# Patient Record
Sex: MALE | Race: WHITE | NOT HISPANIC OR LATINO | Employment: OTHER | URBAN - METROPOLITAN AREA
[De-identification: names, ages, dates, MRNs, and addresses within clinical notes are randomized per-mention and may not be internally consistent; named-entity substitution may affect disease eponyms.]

---

## 2019-12-07 ENCOUNTER — HOSPITAL ENCOUNTER (EMERGENCY)
Facility: CLINIC | Age: 79
Discharge: HOME | End: 2019-12-07
Attending: EMERGENCY MEDICINE | Admitting: EMERGENCY MEDICINE
Payer: COMMERCIAL

## 2019-12-07 VITALS
TEMPERATURE: 97.5 F | HEART RATE: 71 BPM | SYSTOLIC BLOOD PRESSURE: 156 MMHG | DIASTOLIC BLOOD PRESSURE: 77 MMHG | OXYGEN SATURATION: 95 % | BODY MASS INDEX: 31.6 KG/M2 | RESPIRATION RATE: 13 BRPM | WEIGHT: 233 LBS

## 2019-12-07 DIAGNOSIS — R07.9 CHEST PAIN, UNSPECIFIED TYPE: ICD-10-CM

## 2019-12-07 LAB
ALBUMIN SERPL-MCNC: 3.9 G/DL (ref 3.4–5)
ALP SERPL-CCNC: 115 U/L (ref 40–150)
ALT SERPL W P-5'-P-CCNC: 39 U/L (ref 0–70)
ANION GAP SERPL CALCULATED.3IONS-SCNC: 7 MMOL/L (ref 3–14)
AST SERPL W P-5'-P-CCNC: 36 U/L (ref 0–45)
BASOPHILS # BLD AUTO: 0 10E9/L (ref 0–0.2)
BASOPHILS NFR BLD AUTO: 0 %
BILIRUB SERPL-MCNC: 0.5 MG/DL (ref 0.2–1.3)
BUN SERPL-MCNC: 19 MG/DL (ref 7–30)
CALCIUM SERPL-MCNC: 9.2 MG/DL (ref 8.5–10.1)
CHLORIDE SERPL-SCNC: 106 MMOL/L (ref 94–109)
CO2 SERPL-SCNC: 24 MMOL/L (ref 20–32)
CREAT SERPL-MCNC: 0.94 MG/DL (ref 0.66–1.25)
DIFFERENTIAL METHOD BLD: ABNORMAL
EOSINOPHIL # BLD AUTO: 0 10E9/L (ref 0–0.7)
EOSINOPHIL NFR BLD AUTO: 0 %
ERYTHROCYTE [DISTWIDTH] IN BLOOD BY AUTOMATED COUNT: 13.5 % (ref 10–15)
GFR SERPL CREATININE-BSD FRML MDRD: 77 ML/MIN/{1.73_M2}
GLUCOSE SERPL-MCNC: 174 MG/DL (ref 70–99)
HCT VFR BLD AUTO: 44 % (ref 40–53)
HGB BLD-MCNC: 14.8 G/DL (ref 13.3–17.7)
INR PPP: 1 (ref 0.86–1.14)
LYMPHOCYTES # BLD AUTO: 14.3 10E9/L (ref 0.8–5.3)
LYMPHOCYTES NFR BLD AUTO: 73 %
MCH RBC QN AUTO: 31.7 PG (ref 26.5–33)
MCHC RBC AUTO-ENTMCNC: 33.6 G/DL (ref 31.5–36.5)
MCV RBC AUTO: 94 FL (ref 78–100)
MONOCYTES # BLD AUTO: 0.4 10E9/L (ref 0–1.3)
MONOCYTES NFR BLD AUTO: 2 %
NEUTROPHILS # BLD AUTO: 4.3 10E9/L (ref 1.6–8.3)
NEUTROPHILS NFR BLD AUTO: 22 %
OTHER CELLS # BLD MANUAL: 0.6 10E9/L
OTHER CELLS NFR BLD MANUAL: 3 %
PLATELET # BLD AUTO: 190 10E9/L (ref 150–450)
PLATELET # BLD EST: ABNORMAL 10*3/UL
POLYCHROMASIA BLD QL SMEAR: SLIGHT
POTASSIUM SERPL-SCNC: 4.3 MMOL/L (ref 3.4–5.3)
PROT SERPL-MCNC: 7.4 G/DL (ref 6.8–8.8)
RBC # BLD AUTO: 4.67 10E12/L (ref 4.4–5.9)
RBC MORPH BLD: ABNORMAL
SMUDGE CELLS BLD QL SMEAR: PRESENT
SODIUM SERPL-SCNC: 137 MMOL/L (ref 133–144)
TROPONIN I SERPL-MCNC: <0.015 UG/L (ref 0–0.04)
TROPONIN I SERPL-MCNC: <0.015 UG/L (ref 0–0.04)
WBC # BLD AUTO: 19.6 10E9/L (ref 4–11)

## 2019-12-07 PROCEDURE — 93010 ELECTROCARDIOGRAM REPORT: CPT | Mod: Z6 | Performed by: EMERGENCY MEDICINE

## 2019-12-07 PROCEDURE — 85025 COMPLETE CBC W/AUTO DIFF WBC: CPT | Performed by: EMERGENCY MEDICINE

## 2019-12-07 PROCEDURE — 25000132 ZZH RX MED GY IP 250 OP 250 PS 637: Performed by: EMERGENCY MEDICINE

## 2019-12-07 PROCEDURE — 93005 ELECTROCARDIOGRAM TRACING: CPT | Performed by: EMERGENCY MEDICINE

## 2019-12-07 PROCEDURE — 80053 COMPREHEN METABOLIC PANEL: CPT | Performed by: EMERGENCY MEDICINE

## 2019-12-07 PROCEDURE — 99284 EMERGENCY DEPT VISIT MOD MDM: CPT | Mod: 25 | Performed by: EMERGENCY MEDICINE

## 2019-12-07 PROCEDURE — 85610 PROTHROMBIN TIME: CPT | Performed by: EMERGENCY MEDICINE

## 2019-12-07 PROCEDURE — 84484 ASSAY OF TROPONIN QUANT: CPT | Performed by: EMERGENCY MEDICINE

## 2019-12-07 PROCEDURE — 99284 EMERGENCY DEPT VISIT MOD MDM: CPT | Performed by: EMERGENCY MEDICINE

## 2019-12-07 RX ORDER — TRAMADOL HYDROCHLORIDE 50 MG/1
50 TABLET ORAL
COMMUNITY
Start: 2019-08-17 | End: 2022-12-16

## 2019-12-07 RX ORDER — ACETAMINOPHEN 500 MG
1000 TABLET ORAL
COMMUNITY
Start: 2019-08-17 | End: 2021-02-11

## 2019-12-07 RX ORDER — NITROGLYCERIN 0.4 MG/1
0.4 TABLET SUBLINGUAL
COMMUNITY
Start: 2019-11-04 | End: 2021-02-11

## 2019-12-07 RX ORDER — METOPROLOL TARTRATE 25 MG/1
12.5 TABLET, FILM COATED ORAL
COMMUNITY
Start: 2019-10-18 | End: 2021-02-11

## 2019-12-07 RX ORDER — LISINOPRIL 5 MG/1
5 TABLET ORAL
COMMUNITY
Start: 2019-10-18 | End: 2021-02-11

## 2019-12-07 RX ORDER — ATORVASTATIN CALCIUM 40 MG/1
40 TABLET, FILM COATED ORAL
COMMUNITY
Start: 2019-10-18 | End: 2021-02-11

## 2019-12-07 RX ORDER — PANTOPRAZOLE SODIUM 40 MG/1
40 TABLET, DELAYED RELEASE ORAL
COMMUNITY
Start: 2019-10-18 | End: 2021-02-11

## 2019-12-07 RX ORDER — ACETAMINOPHEN 500 MG
1000 TABLET ORAL ONCE
Status: COMPLETED | OUTPATIENT
Start: 2019-12-07 | End: 2019-12-07

## 2019-12-07 RX ADMIN — ACETAMINOPHEN 1000 MG: 500 TABLET ORAL at 20:43

## 2019-12-07 NOTE — ED PROVIDER NOTES
History     Chief Complaint   Patient presents with     Chest Pain     The history is provided by the patient.     Brannon Boggs is a 79 year old male who is presenting to the emergency department via EMS with complaints of chest pain. The patient states that he was swimming in a hot tub when he started having chest pain. He took Nitroglycerin prior to arrival and says his pain went from 7/10 to nothing currently. He says the pain lasted only a few minutes, and cleared up after the Nitroglycerin was taken. He felt slightly short of breath, but says this is gone now. He does not feel nauseated and says his legs are not swollen. He has some mild abdominal pain. The patient was seen one year ago after having a heart attack. He had an angiogram done a few months ago that showed things looked stable and unchanged from before. 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 The patient has some bruises on his abdomen from insulin shots. He also has hairy cell leukemia. He was given four 81 mg Aspirin tablets and two Nitroglycerin tablets prior to arrival.  No recent illness, cough, fever or chills.  No leg pain or swelling.  He does describe paresthesias on the bottoms of his feet.  This is not new.    I was able to review his clinical records from care everywhere.  He did have a recent angiogram on 9/25/2019.  Results showed a 0% stenosed proximal LAD lesion that was previously stented.  He also had a mid to distal LAD lesion that was 50%.  Left circumflex showed minimal irregularities.  Proximal/mid RCA showed a 20% stenosis.  RPDA showed a 40% lesion.  Compared to previous angiogram done 8/2/2018 showed no significant change.    Allergies:  No Known Allergies    Problem List:    There are no active problems to display for this patient.       Past Medical History:    No past medical history on file.    Past Surgical History:    No past surgical history on file.    Family History:    No  family history on file.    Social History:  Marital Status:   [2]  Social History     Tobacco Use     Smoking status: Not on file   Substance Use Topics     Alcohol use: Not on file     Drug use: Not on file        Medications:    acetaminophen (TYLENOL) 500 MG tablet  aspirin (ASA) 81 MG tablet  atorvastatin (LIPITOR) 40 MG tablet  GLYBURIDE PO  insulin glargine (LANTUS PEN) 100 UNIT/ML pen  lisinopril (PRINIVIL/ZESTRIL) 5 MG tablet  metFORMIN (GLUCOPHAGE) 500 MG tablet  metoprolol tartrate (LOPRESSOR) 25 MG tablet  nitroGLYcerin (NITROSTAT) 0.4 MG sublingual tablet  pantoprazole (PROTONIX) 40 MG EC tablet  traMADol (ULTRAM) 50 MG tablet  ORDER FOR DME          Review of Systems   All other systems reviewed and are negative.      Physical Exam   BP: 138/80  Pulse: 66  Heart Rate: 65  Temp: 97.5  F (36.4  C)  Resp: 16  Weight: 105.7 kg (233 lb)(bed scale)  SpO2: 96 %      Physical Exam general alert pleasant and cooperative male.  He is mildly demented.  Review of records confirms this.  HEENT shows no facial asymmetry or droop.  Speech is clear.  Neck is supple without bruits or stridor.  Lungs reveal no adventitious sounds.  Cardiac auscultation is normal.  Abdomen is obese but benign to palpation.  His extremities reveal no pitting edema or calf tenderness.  Negative Homans.    ED Course        Procedures               EKG Interpretation:      Interpreted by Mike Marcos MD  Time reviewed: 17:18  Symptoms at time of EKG: None   Rhythm: Sinus  Rate: Normal  Axis: Normal  Ectopy: Partially paced  Conduction: 1st degree AV block  ST Segments/ T Waves: T wave inversion Anterolateral  Q Waves: none  Comparison to prior: 12/16/12.  Today's EKG shows a partially paced rhythm.  Inverted T's laterally.  I do not have printouts of recent EKGs done at his home clinic or cardiology since his procedures.    Clinical Impression: Partially paced EKG with sinus rhythm and first-degree block.  Inverted T's  [facility]                Critical Care time:  none               Results for orders placed or performed during the hospital encounter of 12/07/19 (from the past 24 hour(s))   CBC with platelets differential   Result Value Ref Range    WBC 19.6 (H) 4.0 - 11.0 10e9/L    RBC Count 4.67 4.4 - 5.9 10e12/L    Hemoglobin 14.8 13.3 - 17.7 g/dL    Hematocrit 44.0 40.0 - 53.0 %    MCV 94 78 - 100 fl    MCH 31.7 26.5 - 33.0 pg    MCHC 33.6 31.5 - 36.5 g/dL    RDW 13.5 10.0 - 15.0 %    Platelet Count 190 150 - 450 10e9/L    Diff Method Manual Differential     % Neutrophils 22.0 %    % Lymphocytes 73.0 %    % Monocytes 2.0 %    % Eosinophils 0.0 %    % Basophils 0.0 %    % Other Cells 3.0 %    Absolute Neutrophil 4.3 1.6 - 8.3 10e9/L    Absolute Lymphocytes 14.3 (H) 0.8 - 5.3 10e9/L    Absolute Monocytes 0.4 0.0 - 1.3 10e9/L    Absolute Eosinophils 0.0 0.0 - 0.7 10e9/L    Absolute Basophils 0.0 0.0 - 0.2 10e9/L    Absolute Other Cells 0.6 (H) 0 10e9/L    Polychromasia Slight     Smudge Cells Present     RBC Morphology Consistent with reported results     Platelet Estimate       Automated count confirmed.  Platelet morphology is normal.   INR   Result Value Ref Range    INR 1.00 0.86 - 1.14   Comprehensive metabolic panel   Result Value Ref Range    Sodium 137 133 - 144 mmol/L    Potassium 4.3 3.4 - 5.3 mmol/L    Chloride 106 94 - 109 mmol/L    Carbon Dioxide 24 20 - 32 mmol/L    Anion Gap 7 3 - 14 mmol/L    Glucose 174 (H) 70 - 99 mg/dL    Urea Nitrogen 19 7 - 30 mg/dL    Creatinine 0.94 0.66 - 1.25 mg/dL    GFR Estimate 77 >60 mL/min/[1.73_m2]    GFR Estimate If Black 89 >60 mL/min/[1.73_m2]    Calcium 9.2 8.5 - 10.1 mg/dL    Bilirubin Total 0.5 0.2 - 1.3 mg/dL    Albumin 3.9 3.4 - 5.0 g/dL    Protein Total 7.4 6.8 - 8.8 g/dL    Alkaline Phosphatase 115 40 - 150 U/L    ALT 39 0 - 70 U/L    AST 36 0 - 45 U/L   Troponin I   Result Value Ref Range    Troponin I ES <0.015 0.000 - 0.045 ug/L   Troponin I   Result Value Ref Range     "Troponin I ES <0.015 0.000 - 0.045 ug/L       Medications   acetaminophen (TYLENOL) tablet 1,000 mg (1,000 mg Oral Given 12/7/19 2043)     On repeated checks patient is resting comfortably.  Denies pain to me.  However he tells other staff members that he said chest pain for over a year.  He told the ERT that he had a \"small elephant sitting on his chest\".  However when I confronted him with this he stated he had no pain at all.  Assessments & Plan (with Medical Decision Making)   Brannon Boggs is a 79 year old male who is presenting to the emergency department via EMS with complaints of chest pain. The patient states that he was swimming in a hot tub when he started having chest pain. He took Nitroglycerin prior to arrival and says his pain went from 7/10 to nothing currently. He says the pain lasted only a few minutes, and cleared up after the Nitroglycerin was taken. He felt slightly short of breath, but says this is gone now. He does not feel nauseated and says his legs are not swollen. He has some mild abdominal pain. The patient was seen one year ago after having a heart attack. He had an angiogram done a few months ago that showed things looked stable and unchanged from before. 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 The patient has some bruises on his abdomen from insulin shots. He also has hairy cell leukemia. He was given four 81 mg Aspirin tablets and two Nitroglycerin tablets prior to arrival.  No recent illness, cough, fever or chills.  No leg pain or swelling.  He does describe paresthesias on the bottoms of his feet.  This is not new.    I was able to review his clinical records from care everywhere.  He did have a recent angiogram on 9/25/2019.  Results showed a 0% stenosed proximal LAD lesion that was previously stented.  He also had a mid to distal LAD lesion that was 50%.  Left circumflex showed minimal irregularities.  Proximal/mid RCA showed a 20% stenosis. "  RPDA showed a 40% lesion.  Compared to previous angiogram done 8/2/2018 showed no significant change.  On presentation patient was vitally stable.  Is not hypoxic.  His exam was unremarkable noted above.  EKG showed a partially paced rhythm.  Did not have a recent EKG to compare.  His initial blood work was unremarkable including troponin except his white count is elevated due to his leukemia.  He does have dementia and continually changed his story regarding chest pain and not having chest pain.  Second troponin was unchanged and normal.  Patient is given handout on chest pain.  Reasons to return free assessment discussed.  Tinea taking his previous meds.  I have reviewed the nursing notes.    I have reviewed the findings, diagnosis, plan and need for follow up with the patient.       New Prescriptions    No medications on file       Final diagnoses:   Chest pain, unspecified type                   This document serves as a record of services personally performed by Mike Marcos MD. It was created on their behalf by Areli Earl, a trained medical scribe. The creation of this record is based on the provider's personal observations and the statements of the patient. This document has been checked and approved by the attending provider.  Note: Chart documentation done in part with Dragon Voice Recognition software. Although reviewed after completion, some word and grammatical errors may remain.  12/7/2019   [facility] EMERGENCY DEPARTMENT     Mike Marcos MD  12/07/19 6373

## 2019-12-07 NOTE — ED TRIAGE NOTES
"Pt was swimming and in hot tub with grandkids and started having chest pain, took nitroglycerin prior to arrival with pain  At 7 and now at \"next to nothing\"  "

## 2019-12-07 NOTE — ED AVS SNAPSHOT
[facility] Emergency Department  [address] [place] [address]  Phone:  [phone]  Fax:  [phone]                                    Brannon Boggs   MRN: [id number]    Department:  [facility] Emergency Department   Date of Visit:  12/7/2019           After Visit Summary Signature Page    I have received my discharge instructions, and my questions have been answered. I have discussed any challenges I see with this plan with the nurse or doctor.    ..........................................................................................................................................  Patient/Patient Representative Signature      ..........................................................................................................................................  Patient Representative Print Name and Relationship to Patient    ..................................................               ................................................  Date                                   Time    ..........................................................................................................................................  Reviewed by Signature/Title    ...................................................              ..............................................  Date                                               Time          22EPIC Rev 08/18

## 2019-12-08 NOTE — ED NOTES
Wife and son back at bedside.  Updated family plan for repeat troponin at 2030.  Provider aware family is back.

## 2019-12-08 NOTE — ED NOTES
Provider in room.  Pt denies chest pain or pressure at this time.  Pt changed out of his wet swimsuit into scrub pants and sheets changed.

## 2019-12-08 NOTE — ED NOTES
Spoke with pt about his pain.  Pt states that he has chest discomfort.  The chest discomfort started a year ago and is 1-2/10.  Pt states that it has been a continuous pain.  Today's pain prior to arrival was different then his continuous pain he has.  Pt stated that he does not have the pain he had earlier.  Pt also concerned about the pain/numbness he has in both hands, has had it for a year.  He stated that pain he has been to the doctor to evaluate.

## 2019-12-09 ENCOUNTER — APPOINTMENT (OUTPATIENT)
Dept: GENERAL RADIOLOGY | Facility: CLINIC | Age: 79
End: 2019-12-09
Attending: FAMILY MEDICINE
Payer: COMMERCIAL

## 2019-12-09 ENCOUNTER — HOSPITAL ENCOUNTER (EMERGENCY)
Facility: CLINIC | Age: 79
Discharge: HOME | End: 2019-12-09
Attending: FAMILY MEDICINE | Admitting: FAMILY MEDICINE
Payer: COMMERCIAL

## 2019-12-09 VITALS
DIASTOLIC BLOOD PRESSURE: 75 MMHG | OXYGEN SATURATION: 92 % | HEART RATE: 84 BPM | RESPIRATION RATE: 20 BRPM | SYSTOLIC BLOOD PRESSURE: 145 MMHG | TEMPERATURE: 98.6 F

## 2019-12-09 DIAGNOSIS — M62.81 GENERALIZED MUSCLE WEAKNESS: ICD-10-CM

## 2019-12-09 DIAGNOSIS — R07.9 CHEST PAIN, UNSPECIFIED TYPE: ICD-10-CM

## 2019-12-09 DIAGNOSIS — K59.00 CONSTIPATION, UNSPECIFIED CONSTIPATION TYPE: ICD-10-CM

## 2019-12-09 DIAGNOSIS — R11.2 NAUSEA AND VOMITING, INTRACTABILITY OF VOMITING NOT SPECIFIED, UNSPECIFIED VOMITING TYPE: ICD-10-CM

## 2019-12-09 LAB
ALBUMIN UR-MCNC: NEGATIVE MG/DL
ANION GAP SERPL CALCULATED.3IONS-SCNC: 11 MMOL/L (ref 3–14)
APPEARANCE UR: ABNORMAL
BACTERIA #/AREA URNS HPF: ABNORMAL /HPF
BASOPHILS # BLD AUTO: 0.1 10E9/L (ref 0–0.2)
BASOPHILS NFR BLD AUTO: 0.2 %
BILIRUB UR QL STRIP: NEGATIVE
BUN SERPL-MCNC: 20 MG/DL (ref 7–30)
CALCIUM SERPL-MCNC: 9.2 MG/DL (ref 8.5–10.1)
CHLORIDE SERPL-SCNC: 101 MMOL/L (ref 94–109)
CO2 SERPL-SCNC: 21 MMOL/L (ref 20–32)
COLOR UR AUTO: YELLOW
CREAT SERPL-MCNC: 0.85 MG/DL (ref 0.66–1.25)
DIFFERENTIAL METHOD BLD: ABNORMAL
EOSINOPHIL NFR BLD AUTO: 0.1 %
ERYTHROCYTE [DISTWIDTH] IN BLOOD BY AUTOMATED COUNT: 13.6 % (ref 10–15)
GFR SERPL CREATININE-BSD FRML MDRD: 83 ML/MIN/{1.73_M2}
GLUCOSE SERPL-MCNC: 247 MG/DL (ref 70–99)
GLUCOSE UR STRIP-MCNC: >499 MG/DL
HCT VFR BLD AUTO: 45.2 % (ref 40–53)
HGB BLD-MCNC: 14.9 G/DL (ref 13.3–17.7)
HGB UR QL STRIP: NEGATIVE
HYALINE CASTS #/AREA URNS LPF: 1 /LPF (ref 0–2)
IMM GRANULOCYTES # BLD: 0.1 10E9/L (ref 0–0.4)
IMM GRANULOCYTES NFR BLD: 0.5 %
KETONES UR STRIP-MCNC: 5 MG/DL
LEUKOCYTE ESTERASE UR QL STRIP: NEGATIVE
LYMPHOCYTES # BLD AUTO: 8.3 10E9/L (ref 0.8–5.3)
LYMPHOCYTES NFR BLD AUTO: 38.1 %
MCH RBC QN AUTO: 31.1 PG (ref 26.5–33)
MCHC RBC AUTO-ENTMCNC: 33 G/DL (ref 31.5–36.5)
MCV RBC AUTO: 94 FL (ref 78–100)
MONOCYTES # BLD AUTO: 1 10E9/L (ref 0–1.3)
MONOCYTES NFR BLD AUTO: 4.7 %
MUCOUS THREADS #/AREA URNS LPF: PRESENT /LPF
NEUTROPHILS # BLD AUTO: 12.3 10E9/L (ref 1.6–8.3)
NEUTROPHILS NFR BLD AUTO: 56.4 %
NITRATE UR QL: NEGATIVE
NRBC # BLD AUTO: 0 10*3/UL
NRBC BLD AUTO-RTO: 0 /100
NT-PROBNP SERPL-MCNC: 51 PG/ML (ref 0–1800)
PH UR STRIP: 5 PH (ref 5–7)
PLATELET # BLD AUTO: 202 10E9/L (ref 150–450)
POTASSIUM SERPL-SCNC: 4.5 MMOL/L (ref 3.4–5.3)
RBC # BLD AUTO: 4.79 10E12/L (ref 4.4–5.9)
RBC #/AREA URNS AUTO: <1 /HPF (ref 0–2)
SODIUM SERPL-SCNC: 133 MMOL/L (ref 133–144)
SOURCE: ABNORMAL
SP GR UR STRIP: 1.02 (ref 1–1.03)
TROPONIN I SERPL-MCNC: <0.015 UG/L (ref 0–0.04)
UROBILINOGEN UR STRIP-MCNC: 0 MG/DL (ref 0–2)
WBC # BLD AUTO: 21.8 10E9/L (ref 4–11)
WBC #/AREA URNS AUTO: 3 /HPF (ref 0–5)

## 2019-12-09 PROCEDURE — 85025 COMPLETE CBC W/AUTO DIFF WBC: CPT | Performed by: FAMILY MEDICINE

## 2019-12-09 PROCEDURE — 80048 BASIC METABOLIC PNL TOTAL CA: CPT | Performed by: FAMILY MEDICINE

## 2019-12-09 PROCEDURE — 74019 RADEX ABDOMEN 2 VIEWS: CPT | Mod: TC

## 2019-12-09 PROCEDURE — 87088 URINE BACTERIA CULTURE: CPT | Performed by: FAMILY MEDICINE

## 2019-12-09 PROCEDURE — 99285 EMERGENCY DEPT VISIT HI MDM: CPT | Mod: 25 | Performed by: FAMILY MEDICINE

## 2019-12-09 PROCEDURE — 71046 X-RAY EXAM CHEST 2 VIEWS: CPT | Mod: TC

## 2019-12-09 PROCEDURE — 93010 ELECTROCARDIOGRAM REPORT: CPT | Mod: Z6 | Performed by: FAMILY MEDICINE

## 2019-12-09 PROCEDURE — 96374 THER/PROPH/DIAG INJ IV PUSH: CPT | Performed by: FAMILY MEDICINE

## 2019-12-09 PROCEDURE — 83880 ASSAY OF NATRIURETIC PEPTIDE: CPT | Performed by: FAMILY MEDICINE

## 2019-12-09 PROCEDURE — 93005 ELECTROCARDIOGRAM TRACING: CPT | Performed by: FAMILY MEDICINE

## 2019-12-09 PROCEDURE — 71046 X-RAY EXAM CHEST 2 VIEWS: CPT | Mod: TC | Performed by: RADIOLOGY

## 2019-12-09 PROCEDURE — 25000128 H RX IP 250 OP 636: Performed by: FAMILY MEDICINE

## 2019-12-09 PROCEDURE — 93005 ELECTROCARDIOGRAM TRACING: CPT | Mod: 76 | Performed by: FAMILY MEDICINE

## 2019-12-09 PROCEDURE — 81001 URINALYSIS AUTO W/SCOPE: CPT | Performed by: FAMILY MEDICINE

## 2019-12-09 PROCEDURE — 25000125 ZZHC RX 250: Performed by: FAMILY MEDICINE

## 2019-12-09 PROCEDURE — 84484 ASSAY OF TROPONIN QUANT: CPT | Performed by: FAMILY MEDICINE

## 2019-12-09 PROCEDURE — 93010 ELECTROCARDIOGRAM REPORT: CPT | Mod: 76 | Performed by: FAMILY MEDICINE

## 2019-12-09 PROCEDURE — 87086 URINE CULTURE/COLONY COUNT: CPT | Performed by: FAMILY MEDICINE

## 2019-12-09 PROCEDURE — 96375 TX/PRO/DX INJ NEW DRUG ADDON: CPT | Performed by: FAMILY MEDICINE

## 2019-12-09 RX ORDER — LACTULOSE 20 G/30ML
30 SOLUTION ORAL 3 TIMES DAILY
Qty: 450 ML | Refills: 0 | Status: SHIPPED | OUTPATIENT
Start: 2019-12-09 | End: 2019-12-14

## 2019-12-09 RX ORDER — ONDANSETRON 2 MG/ML
4 INJECTION INTRAMUSCULAR; INTRAVENOUS ONCE
Status: COMPLETED | OUTPATIENT
Start: 2019-12-09 | End: 2019-12-09

## 2019-12-09 RX ORDER — FAMOTIDINE 20 MG/1
20 TABLET, FILM COATED ORAL 2 TIMES DAILY
Qty: 14 TABLET | Refills: 0 | COMMUNITY
Start: 2019-12-09 | End: 2019-12-16

## 2019-12-09 RX ADMIN — FAMOTIDINE 20 MG: 10 INJECTION, SOLUTION INTRAVENOUS at 04:04

## 2019-12-09 RX ADMIN — ONDANSETRON 4 MG: 2 INJECTION INTRAMUSCULAR; INTRAVENOUS at 03:40

## 2019-12-09 NOTE — DISCHARGE INSTRUCTIONS
We did not find a worrisome cause for your chest pain, nausea and vomiting and weakness.  Your blood work were stable including your white blood count, and heart enzyme (troponin).  You have not had a bowel movement for at least 5 days, and having a lot of burping, nausea and vomiting.  Begin lactulose 30 mL's by mouth 3 times a day for the next 5 days to help promote stooling.  Your constipation may also be increasing your acid reflux symptoms.  Add Pepcid 20 mg twice a day for the next week.  Follow-up with your primary care provider, Dr. Tamayo in the next 3-5 days for recheck.  I will notify you if your urinalysis is suspicious for a urinary tract infection.  Return at any time if your symptoms worsen.

## 2019-12-09 NOTE — ED TRIAGE NOTES
Pt presents by EMS with concerns of chest pain and shortness of breath.  According to pt the pain got worse last evening.  Asked pt if he had pain at this time, he responded short of breath.

## 2019-12-09 NOTE — ED NOTES
Pts wife states that pt was in the hot tub on Saturday prior to the onset of chest pain. Reports pt was seen in the ED on Saturday night. Pt continues to c/o chest pain and states that on Sunday he was belching and vomited x 1.

## 2019-12-09 NOTE — ED AVS SNAPSHOT
[facility] Emergency Department  [address] [place] [address]  Phone:  [phone]  Fax:  [phone]                                    Brannon Boggs   MRN: [id number]    Department:  [facility] Emergency Department   Date of Visit:  12/9/2019           After Visit Summary Signature Page    I have received my discharge instructions, and my questions have been answered. I have discussed any challenges I see with this plan with the nurse or doctor.    ..........................................................................................................................................  Patient/Patient Representative Signature      ..........................................................................................................................................  Patient Representative Print Name and Relationship to Patient    ..................................................               ................................................  Date                                   Time    ..........................................................................................................................................  Reviewed by Signature/Title    ...................................................              ..............................................  Date                                               Time          22EPIC Rev 08/18

## 2019-12-09 NOTE — ED PROVIDER NOTES
[facility] ED Provider Note   Patient: Brannon Boggs  MRN #:  [id number]  Date of Visit: December 9, 2019    CC:     Chief Complaint   Patient presents with     Chest Pain     HPI:  Brannon Boggs is a 79 year old male with dementia, diabetes, known coronary disease who presented to the emergency department by EMS after he developed another episode of chest pain, shortness of breath, nausea and vomiting x2, and weakness.  Patient was here in the emergency department on Sunday with an episode of chest pain after he was swimming in the hot tub.  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  He lives in [place], [place], and was in the area to visit his son.  The patient and his wife are staying at the [facility].  Patient was discharged back to the [facility], and at approximately 6 PM Sunday night, he started to develop some significant burping, and he took some Rolaids.  Short time later he threw up, and had another episode around midnight.  He started to develop some increased weakness after the vomiting and could not get out of bed.  Patient reports some chest pains, but this appears to be somewhat chronic.  He reports some shortness of breath.  The patient's wife reports that he was restless most of the night, and appeared to be gradually worsening especially from the standpoint of weakness.  Patient has not had a bowel movement since last Wednesday.  It is not uncommon for him to be constipated and not have a bowel movement for 5 to 7 days.  Based on his records, patient had an interrogation of his pacemaker proximally November 18 showing that it was functioning normally.  It appears to be a demand pacemaker, [employer] manufactured.  Patient had a recent angiogram on September 25 showing a mid to distal LAD lesion that was 50% but no other significant obstructions elsewhere.  His previously  stented proximal LAD was 0% stenosed.    Problem List:  There are no active problems to display for this patient.      No past medical history on file.    MEDS: famotidine (PEPCID) 20 MG tablet  lactulose 20 GM/30ML SOLN  acetaminophen (TYLENOL) 500 MG tablet  aspirin (ASA) 81 MG tablet  atorvastatin (LIPITOR) 40 MG tablet  GLYBURIDE PO  insulin glargine (LANTUS PEN) 100 UNIT/ML pen  lisinopril (PRINIVIL/ZESTRIL) 5 MG tablet  metFORMIN (GLUCOPHAGE) 500 MG tablet  metoprolol tartrate (LOPRESSOR) 25 MG tablet  nitroGLYcerin (NITROSTAT) 0.4 MG sublingual tablet  ORDER FOR DME  pantoprazole (PROTONIX) 40 MG EC tablet  traMADol (ULTRAM) 50 MG tablet        ALLERGIES:  No Known Allergies    No past surgical history on file.    Social History     Tobacco Use     Smoking status: Not on file   Substance Use Topics     Alcohol use: Not on file     Drug use: Not on file         Review of Systems   Except as noted in HPI, all other systems were reviewed and are negative    Physical Exam     Vitals were reviewed  Patient Vitals for the past 12 hrs:   BP Temp Temp src Pulse Heart Rate Resp SpO2   12/09/19 0617 (!) 145/75 98.6  F (37  C) Oral -- 77 20 92 %   12/09/19 0604 -- -- -- -- 84 -- 97 %   12/09/19 0603 (!) 155/85 -- -- 84 -- -- --   12/09/19 0530 (!) 173/81 -- -- 77 74 11 95 %   12/09/19 0515 (!) 165/83 -- -- 76 75 17 96 %   12/09/19 0500 (!) 155/84 -- -- -- 75 17 94 %   12/09/19 0445 (!) 191/98 -- -- 72 71 20 95 %   12/09/19 0415 (!) 179/91 -- -- 70 73 10 95 %   12/09/19 0400 (!) 180/81 -- -- 71 72 18 --   12/09/19 0330 (!) 153/72 -- -- 68 61 13 94 %   12/09/19 0315 (!) 148/75 -- -- 71 67 16 92 %   12/09/19 0300 (!) 161/69 -- -- -- 67 14 97 %   12/09/19 0252 (!) 163/90 97.6  F (36.4  C) Oral -- 72 19 96 %     GENERAL APPEARANCE: Alert, pleasant, no distress; patient is forgetful and admits that he does not remember what happened earlier tonight  FACE: normal facies  EYES: Pupils are equal  HENT: normal external  exam  NECK: no adenopathy or asymmetry  RESP: normal respiratory effort; clear breath sounds bilaterally  CV: regular rate and rhythm; no significant murmurs, gallops or rubs  ABD: soft, protuberant, slightly and diffusely tenderness; no rebound or guarding; bowel sounds are increased  MS: no gross deformities noted; normal muscle tone.  EXT: No calf tenderness or pitting edema  SKIN: no worrisome rash  NEURO: no facial droop; no focal deficits, speech is normal  PSYCH: normal mood and affect      Available Lab/Imaging Results     Results for orders placed or performed during the hospital encounter of 12/09/19 (from the past 24 hour(s))   Troponin I   Result Value Ref Range    Troponin I ES <0.015 0.000 - 0.045 ug/L   CBC with platelets differential   Result Value Ref Range    WBC 21.8 (H) 4.0 - 11.0 10e9/L    RBC Count 4.79 4.4 - 5.9 10e12/L    Hemoglobin 14.9 13.3 - 17.7 g/dL    Hematocrit 45.2 40.0 - 53.0 %    MCV 94 78 - 100 fl    MCH 31.1 26.5 - 33.0 pg    MCHC 33.0 31.5 - 36.5 g/dL    RDW 13.6 10.0 - 15.0 %    Platelet Count 202 150 - 450 10e9/L    Diff Method Automated Method     % Neutrophils 56.4 %    % Lymphocytes 38.1 %    % Monocytes 4.7 %    % Eosinophils 0.1 %    % Basophils 0.2 %    % Immature Granulocytes 0.5 %    Nucleated RBCs 0 0 /100    Absolute Neutrophil 12.3 (H) 1.6 - 8.3 10e9/L    Absolute Lymphocytes 8.3 (H) 0.8 - 5.3 10e9/L    Absolute Monocytes 1.0 0.0 - 1.3 10e9/L    Absolute Basophils 0.1 0.0 - 0.2 10e9/L    Abs Immature Granulocytes 0.1 0 - 0.4 10e9/L    Absolute Nucleated RBC 0.0    Basic metabolic panel   Result Value Ref Range    Sodium 133 133 - 144 mmol/L    Potassium 4.5 3.4 - 5.3 mmol/L    Chloride 101 94 - 109 mmol/L    Carbon Dioxide 21 20 - 32 mmol/L    Anion Gap 11 3 - 14 mmol/L    Glucose 247 (H) 70 - 99 mg/dL    Urea Nitrogen 20 7 - 30 mg/dL    Creatinine 0.85 0.66 - 1.25 mg/dL    GFR Estimate 83 >60 mL/min/[1.73_m2]    GFR Estimate If Black >90 >60 mL/min/[1.73_m2]     Calcium 9.2 8.5 - 10.1 mg/dL   Nt probnp inpatient (BNP)   Result Value Ref Range    N-Terminal Pro BNP Inpatient 51 0 - 1,800 pg/mL   XR Chest 2 Views    Narrative    EXAM: XR CHEST 2 VW  LOCATION: [facility]  DATE/TIME: 12/9/2019 3:37 AM    INDICATION: Chest pain  COMPARISON: None.      Impression    IMPRESSION: Left chest wall pacemaker noted with right atrial and right ventricular leads, intact. Normal heart size and mediastinal contours. Aortic atherosclerosis. A small hazy opacity at the left lower heart border is likely mediastinal fat pad or   some atelectasis. The lungs are clear. There is no pleural effusion or pneumothorax. Right shoulder arthroplasty noted. Degenerative changes are present in the spine and left shoulder. Slightly displaced rib fractures left fifth-ninth ribs, all with some   callus formation, likely nonacute.   XR Abdomen 2 Views    Narrative    EXAM: XR ABDOMEN 2 VIEWS  LOCATION: [facility]  DATE/TIME: 12/9/2019 3:38 AM    INDICATION: Nausea and vomiting. Abdominal distention.  COMPARISON: None.      Impression    IMPRESSION: Supine and upright views. The bowel gas pattern is within normal limits. No free air or air-fluid levels. Small amount of  stool in the colon.    UA reflex to Microscopic   Result Value Ref Range    Color Urine Yellow     Appearance Urine Slightly Cloudy     Glucose Urine >499 (A) NEG^Negative mg/dL    Bilirubin Urine Negative NEG^Negative    Ketones Urine 5 (A) NEG^Negative mg/dL    Specific Gravity Urine 1.018 1.003 - 1.035    Blood Urine Negative NEG^Negative    pH Urine 5.0 5.0 - 7.0 pH    Protein Albumin Urine Negative NEG^Negative mg/dL    Urobilinogen mg/dL 0.0 0.0 - 2.0 mg/dL    Nitrite Urine Negative NEG^Negative    Leukocyte Esterase Urine Negative NEG^Negative    Source Midstream Urine     RBC Urine <1 0 - 2 /HPF    WBC Urine 3 0 - 5 /HPF    Bacteria Urine Moderate (A) NEG^Negative /HPF    Mucous Urine Present (A) NEG^Negative  /LPF    Hyaline Casts 1 0 - 2 /LPF     EKG reviewed by me: Electronic ventricular pacemaker.  Heart rate of 71.  QT interval of 442,, QRS interval of 170.  No acute ST-T wave changes.    EKG #2 reveals an intrinsic heart rhythm, interspersed with electronic ventricular pacemaker.  Heart rate is 68, ME interval of 310 with his native rhythm, and QT interval 402.  No acute ST-T wave changes.         Impression     Final diagnoses:   Chest pain   Nausea and vomiting   Generalized muscle weakness   Constipation         ED Course & Medical Decision Making   Brannon Boggs is a 79 year old male who presented to the emergency department by EMS after developing another episode of chest pain, shortness of breath, nausea and vomiting x2 and weakness.  Patient was seen shortly after arrival.  He had previously been here in the emergency department the day before.  He was placed on the telemetry monitor and we noted that he was flipping between his native inherent rhythm and the pacemaker rhythm.  Patient was not symptomatic at the time that this was flipping back and forth.  It appears that he has a demand pacemaker.  We were able to find records relating to his pacemaker, and that it was actually interrogated just 3 weeks ago.  This pacemaker was placed just 2 years ago after he had a heart attack.  Patient also has had recent angiogram at the end of September showing no significant obstructive lesions.  Patient had been extremely constipated over the last 5 days without a bowel movement.  He reported onset of his symptoms last night with a lot of burping, followed by 2 episodes of vomiting several hours apart.  He then became weak, and had trouble getting out of the chair.  Paramedics were called, and he was transported back to the emergency department.  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  His white count has been in this range, without any  signs of low hemoglobin or platelet count.  His basic metabolic panel was normal except for a elevated glucose of 247.  BNP is 51, and troponin is less than 0.015.  EKG revealed demand ventricular pacemaker.  He has an intrinsic rhythm that appears to be a first or second-degree block.  Chest x-ray revealed a small hazy opacity at the left lower heart border which is likely a mediastinal fat pad.  There is no obvious infiltrate.  There are degenerative changes in the spine, left shoulder, and right shoulder arthroplasty.  There is displaced rib fractures with callus formation.  Abdominal x-ray reveals normal bowel gas pattern.  There is a moderate amount of gas and stool scattered throughout the colon.  No signs of obstruction.  Patient was given IV Pepcid 20 mg IV, and Zofran.  Patient's symptoms resolved and he was able to fall asleep for a few hours.  A urinalysis was obtained and reveals less than 1 red blood cell, 3 white blood cells, with moderate bacteria.  Formal urine culture will be added.  There does not appear to be a clear finding of UTI to explain his weakness.  At this time, patient feels better.  I will have him begin lactulose to help with his constipation.  He has tried many different types of laxatives including MiraLAX, and the wife will also use a suppository at times.  I will have him take lactulose 30 mL's 3 times a day for the next 5-7 days.  Continue his PPI, and add famotidine 20 mg twice daily for 1 week.  I asked him to follow-up with his primary care provider in 3 days.  They are hoping to return to [facility] later today.           Written after-visit summary and instructions were given at the time of discharge.    Follow up Plan:   Viviane Tamayo [address] [address]  [phone]    Schedule an appointment as soon as possible for a visit in 3 days            Disclaimer: This note consists of words and symbols derived from keyboarding and  dictation using voice recognition software.  As a result, there may be errors that have gone undetected.  Please consider this when interpreting information found in this note.       Cruzito Berrios MD  12/09/19 [id number]

## 2019-12-10 ENCOUNTER — TELEPHONE (OUTPATIENT)
Dept: EMERGENCY MEDICINE | Facility: CLINIC | Age: 79
End: 2019-12-10

## 2019-12-10 LAB
BACTERIA SPEC CULT: ABNORMAL
Lab: ABNORMAL
SPECIMEN SOURCE: ABNORMAL

## 2019-12-10 NOTE — TELEPHONE ENCOUNTER
[facility] Emergency Department Lab result notification [Adult-Male]    [place] ED lab result protocol used  Urine culture    Reason for call  Notify of lab results, assess symptoms,  review ED providers recommendations/discharge instructions (if necessary) and advise per ED lab result f/u protocol    Lab Result (including Rx patient on, if applicable)  Final urine culture on 12/10/19 shows the presence of bacteria(s): >100,000 colonies/mL Streptococcus agalactiae sero group B   [facility] Emergency Dept/Urgent [facility] discharge antibiotic: None  As per  ED lab result protocol, treat per Urine culture protocol.    Information table from ED Provider visit on 12/9/19  Symptoms reported at ED visit (Chief complaint, HPI)  Chest Pain      HPI:  Brannon Boggs is a 79 year old male with dementia, diabetes, known coronary disease who presented to the emergency department by EMS after he developed another episode of chest pain, shortness of breath, nausea and vomiting x2, and weakness.  Patient was here in the emergency department on Sunday with an episode of chest pain after he was swimming in the hot tub.  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  He lives in [place], [place], and was in the area to visit his son.  The patient and his wife are staying at the [facility].  Patient was [facility], and at approximately 6 PM Sunday night, he started to develop some significant burping, and he took some Rolaids.  Short time later he threw up, and had another episode around midnight.  He started to develop some increased weakness after the vomiting and could not get out of bed.  Patient reports some chest pains, but this appears to be somewhat chronic.  He reports some shortness of breath.  The patient's wife reports that he was restless most of the night, and appeared to be gradually worsening especially from the standpoint of weakness.  Patient  has not had a bowel movement since last Wednesday.  It is not uncommon for him to be constipated and not have a bowel movement for 5 to 7 days.  Based on his records, patient had an interrogation of his pacemaker proximally November 18 showing that it was functioning normally.  It appears to be a demand pacemaker, [employer] manufactured.  Patient had a recent angiogram on September 25 showing a mid to distal LAD lesion that was 50% but no other significant obstructions elsewhere.  His previously stented proximal LAD was 0% stenosed.     Significant Medical hx, if applicable (i.e. CKD, diabetes) See above   Allergies No Known Allergies   Weight, if applicable Wt Readings from Last 2 Encounters:   12/07/19 105.7 kg (233 lb)   12/16/12 110.2 kg (243 lb)      Coumadin/Warfarin [Yes /No] No   Creatinine Level (mg/dl) Creatinine   Date Value Ref Range Status   12/09/2019 0.85 0.66 - 1.25 mg/dL Final      Creatinine clearance (ml/min), if applicable Creatinine clearance cannot be calculated (Unknown ideal weight.)  Est CrCl is 107.07   ED providers Impression and Plan (applicable information) Brannon Boggs is a 79 year old male who presented to the emergency department by EMS after developing another episode of chest pain, shortness of breath, nausea and vomiting x2 and weakness.  Patient was seen shortly after arrival.  He had previously been here in the emergency department the day before.  He was placed on the telemetry monitor and we noted that he was flipping between his native inherent rhythm and the pacemaker rhythm.  Patient was not symptomatic at the time that this was flipping back and forth.  It appears that he has a demand pacemaker.  We were able to find records relating to his pacemaker, and that it was actually interrogated just 3 weeks ago.  This pacemaker was placed just 2 years ago after he had a heart attack.  Patient also has had recent angiogram at the end of September showing no significant  obstructive lesions.  Patient had been extremely constipated over the last 5 days without a bowel movement.  He reported onset of his symptoms last night with a lot of burping, followed by 2 episodes of vomiting several hours apart.  He then became weak, and had trouble getting out of the chair.  Paramedics were called, and he was transported back to the emergency department.  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  His white count has been in this range, without any signs of low hemoglobin or platelet count.  His basic metabolic panel was normal except for a elevated glucose of 247.  BNP is 51, and troponin is less than 0.015.  EKG revealed demand ventricular pacemaker.  He has an intrinsic rhythm that appears to be a first or second-degree block.  Chest x-ray revealed a small hazy opacity at the left lower heart border which is likely a mediastinal fat pad.  There is no obvious infiltrate.  There are degenerative changes in the spine, left shoulder, and right shoulder arthroplasty.  There is displaced rib fractures with callus formation.  Abdominal x-ray reveals normal bowel gas pattern.  There is a moderate amount of gas and stool scattered throughout the colon.  No signs of obstruction.  Patient was given IV Pepcid 20 mg IV, and Zofran.  Patient's symptoms resolved and he was able to fall asleep for a few hours.  A urinalysis was obtained and reveals less than 1 red blood cell, 3 white blood cells, with moderate bacteria.  Formal urine culture will be added.  There does not appear to be a clear finding of UTI to explain his weakness.  At this time, patient feels better.  I will have him begin lactulose to help with his constipation.  He has tried many different types of laxatives including MiraLAX, and the wife will also use a suppository at times.  I will have him take lactulose 30 mL's 3 times a day for the next 5-7 days.  Continue his  "PPI, and add famotidine 20 mg twice daily for 1 week.  I asked him to follow-up with his primary care provider in 3 days.  They are hoping to return to [facility] later today   ED diagnosis Chest pain  Nausea and vomiting   Generalized muscle weakness   Constipation      ED provider Cruzito Berrios MD RN Assessment (Patient s current Symptoms), include time called.  [Insert Left message here if message left]  10:57AM: Spoke with patient's wife, patient is sleeping. The patient's wife states that the patient is having a hard time eating as he will feel like \"something is sitting on his chest.\" They have spoken with the clinic today and have an appointment scheduled for today at 2pm. Denies any difficulty with urination, no blood noted in the urine. Denies any other urinary symptoms. Patient stated he felt \"warm\" last night, is unsure about a fever.     RN Recommendations/Instructions per [place] ED lab result protocol  Patient's wife notified of lab result and treatment recommendations.   Advised the wife that the urine culture should be treated and that I could send in for an antibiotic called Augmentin. The wife states that the patient is being seen in the clinic today at 2 and she would like for the patient's provider to review the result and determine any treatment.   Final urine culture result faxed to the [facility], edgardo ISBELL at fax [phone].  Patient has an appointment today, 12/10/2019 at 2pm.  Advised to be seen in the ED today if patient having chest pain or to notify the PCP regarding this.   The patient's wife has no further questions and they will see the provider today at 2.      Please Contact your PCP clinic or return to the Emergency department if your:    Symptoms worsen or other concerning symptom's.    PCP follow-up Questions asked: YES       [RN Name]  Melonie Johnson RN  [employer] RN  Lung Nodule and ED Lab Result RN  Epic pool (ED " late result f/u RN): [id number]  [facility] INCIDENTAL RADIOLOGY F/U NURSES: P [id number]  # [phone]      Copy of Lab result  Urine Culture Aerobic Bacterial   Order: [id number]   Status:  Final result   Visible to patient:  No (Not Released) Next appt:  None Dx:  Chest pain   Specimen Information: Urine clean catch; Midstream Urine        Component 1d ago   Specimen Description Midstream Urine    Special Requests Specimen received in preservative    Culture Micro Abnormal    >100,000 colonies/mL   Streptococcus agalactiae sero group B   Susceptibility testing not routinely done on this organism from the genitourinary tract.   Our antibiogram indicates that Group B streptococci are susceptible to ampicillin,   penicillin, vancomycin and the cephalosporins. Susceptibility testing must be requested   within 5 days.     Resulting Agency [facility]         Specimen Collected: 12/09/19  6:04 AM Last Resulted: 12/10/19 10:34 AM

## 2019-12-11 NOTE — TELEPHONE ENCOUNTER
6:56Pm: Spoke with patient's wife. She states that the patient's provider did see the urine culture result today and he is being treated with an antibiotic.     Melonie Johnson RN  "[employer] [place] RN  Lung Nodule and ED Lab Result RN  Epic pool (ED late result f/u RN): [id number]  [facility] INCIDENTAL RADIOLOGY F/U NURSES: P [id number]  # [phone]

## 2021-02-11 ENCOUNTER — APPOINTMENT (OUTPATIENT)
Dept: CT IMAGING | Facility: CLINIC | Age: 81
End: 2021-02-11
Attending: EMERGENCY MEDICINE
Payer: COMMERCIAL

## 2021-02-11 ENCOUNTER — HOSPITAL ENCOUNTER (EMERGENCY)
Facility: CLINIC | Age: 81
Discharge: ANOTHER HOSPITAL | End: 2021-02-11
Attending: EMERGENCY MEDICINE | Admitting: EMERGENCY MEDICINE
Payer: COMMERCIAL

## 2021-02-11 ENCOUNTER — HOSPITAL ENCOUNTER (INPATIENT)
Facility: CLINIC | Age: 81
LOS: 2 days | Discharge: HOME HEALTH | DRG: 068 | End: 2021-02-13
Attending: PSYCHIATRY & NEUROLOGY | Admitting: PSYCHIATRY & NEUROLOGY
Payer: COMMERCIAL

## 2021-02-11 VITALS
WEIGHT: 240 LBS | SYSTOLIC BLOOD PRESSURE: 135 MMHG | TEMPERATURE: 97.3 F | BODY MASS INDEX: 32.55 KG/M2 | HEART RATE: 64 BPM | OXYGEN SATURATION: 96 % | DIASTOLIC BLOOD PRESSURE: 72 MMHG | RESPIRATION RATE: 11 BRPM

## 2021-02-11 DIAGNOSIS — I65.09 OCCLUSION OF VERTEBRAL ARTERY, UNSPECIFIED LATERALITY: Primary | ICD-10-CM

## 2021-02-11 DIAGNOSIS — I63.9 CEREBROVASCULAR ACCIDENT (CVA), UNSPECIFIED MECHANISM (H): ICD-10-CM

## 2021-02-11 LAB
ALBUMIN SERPL-MCNC: 3.7 G/DL (ref 3.4–5)
ALBUMIN UR-MCNC: NEGATIVE MG/DL
ALP SERPL-CCNC: 125 U/L (ref 40–150)
ALT SERPL W P-5'-P-CCNC: 21 U/L (ref 0–70)
ANION GAP SERPL CALCULATED.3IONS-SCNC: 6 MMOL/L (ref 3–14)
APPEARANCE UR: CLEAR
AST SERPL W P-5'-P-CCNC: 11 U/L (ref 0–45)
BASOPHILS # BLD AUTO: 0.1 10E9/L (ref 0–0.2)
BASOPHILS NFR BLD AUTO: 0.4 %
BILIRUB SERPL-MCNC: 0.6 MG/DL (ref 0.2–1.3)
BILIRUB UR QL STRIP: NEGATIVE
BUN SERPL-MCNC: 13 MG/DL (ref 7–30)
CALCIUM SERPL-MCNC: 8.8 MG/DL (ref 8.5–10.1)
CHLORIDE SERPL-SCNC: 103 MMOL/L (ref 94–109)
CO2 SERPL-SCNC: 25 MMOL/L (ref 20–32)
COLOR UR AUTO: NORMAL
CREAT SERPL-MCNC: 0.91 MG/DL (ref 0.66–1.25)
DIFFERENTIAL METHOD BLD: ABNORMAL
EOSINOPHIL NFR BLD AUTO: 0.9 %
ERYTHROCYTE [DISTWIDTH] IN BLOOD BY AUTOMATED COUNT: 13.1 % (ref 10–15)
GFR SERPL CREATININE-BSD FRML MDRD: 79 ML/MIN/{1.73_M2}
GLUCOSE SERPL-MCNC: 171 MG/DL (ref 70–99)
GLUCOSE UR STRIP-MCNC: NEGATIVE MG/DL
HCT VFR BLD AUTO: 42.4 % (ref 40–53)
HGB BLD-MCNC: 14.4 G/DL (ref 13.3–17.7)
HGB UR QL STRIP: NEGATIVE
IMM GRANULOCYTES # BLD: 0.1 10E9/L (ref 0–0.4)
IMM GRANULOCYTES NFR BLD: 0.3 %
KETONES UR STRIP-MCNC: NEGATIVE MG/DL
LABORATORY COMMENT REPORT: NORMAL
LEUKOCYTE ESTERASE UR QL STRIP: NEGATIVE
LYMPHOCYTES # BLD AUTO: 11 10E9/L (ref 0.8–5.3)
LYMPHOCYTES NFR BLD AUTO: 58.4 %
MCH RBC QN AUTO: 31 PG (ref 26.5–33)
MCHC RBC AUTO-ENTMCNC: 34 G/DL (ref 31.5–36.5)
MCV RBC AUTO: 91 FL (ref 78–100)
MONOCYTES # BLD AUTO: 1 10E9/L (ref 0–1.3)
MONOCYTES NFR BLD AUTO: 5.1 %
NEUTROPHILS # BLD AUTO: 6.6 10E9/L (ref 1.6–8.3)
NEUTROPHILS NFR BLD AUTO: 34.9 %
NITRATE UR QL: NEGATIVE
NRBC # BLD AUTO: 0 10*3/UL
NRBC BLD AUTO-RTO: 0 /100
NT-PROBNP SERPL-MCNC: 49 PG/ML (ref 0–1800)
PH UR STRIP: 6 PH (ref 5–7)
PLATELET # BLD AUTO: 185 10E9/L (ref 150–450)
POTASSIUM SERPL-SCNC: 4.6 MMOL/L (ref 3.4–5.3)
PROT SERPL-MCNC: 7.2 G/DL (ref 6.8–8.8)
RBC # BLD AUTO: 4.65 10E12/L (ref 4.4–5.9)
SARS-COV-2 RNA RESP QL NAA+PROBE: NEGATIVE
SODIUM SERPL-SCNC: 134 MMOL/L (ref 133–144)
SOURCE: NORMAL
SP GR UR STRIP: 1.02 (ref 1–1.03)
SPECIMEN SOURCE: NORMAL
TROPONIN I SERPL-MCNC: <0.015 UG/L (ref 0–0.04)
UROBILINOGEN UR STRIP-MCNC: 0 MG/DL (ref 0–2)
WBC # BLD AUTO: 18.8 10E9/L (ref 4–11)

## 2021-02-11 PROCEDURE — 93010 ELECTROCARDIOGRAM REPORT: CPT | Performed by: EMERGENCY MEDICINE

## 2021-02-11 PROCEDURE — 83880 ASSAY OF NATRIURETIC PEPTIDE: CPT | Performed by: EMERGENCY MEDICINE

## 2021-02-11 PROCEDURE — 99285 EMERGENCY DEPT VISIT HI MDM: CPT | Mod: 25 | Performed by: EMERGENCY MEDICINE

## 2021-02-11 PROCEDURE — C9803 HOPD COVID-19 SPEC COLLECT: HCPCS | Performed by: EMERGENCY MEDICINE

## 2021-02-11 PROCEDURE — 81003 URINALYSIS AUTO W/O SCOPE: CPT | Performed by: EMERGENCY MEDICINE

## 2021-02-11 PROCEDURE — 250N000011 HC RX IP 250 OP 636: Performed by: EMERGENCY MEDICINE

## 2021-02-11 PROCEDURE — 120N000002 HC R&B MED SURG/OB UMMC

## 2021-02-11 PROCEDURE — 93005 ELECTROCARDIOGRAM TRACING: CPT | Performed by: EMERGENCY MEDICINE

## 2021-02-11 PROCEDURE — 250N000013 HC RX MED GY IP 250 OP 250 PS 637: Performed by: EMERGENCY MEDICINE

## 2021-02-11 PROCEDURE — 87635 SARS-COV-2 COVID-19 AMP PRB: CPT | Performed by: EMERGENCY MEDICINE

## 2021-02-11 PROCEDURE — 80053 COMPREHEN METABOLIC PANEL: CPT | Performed by: EMERGENCY MEDICINE

## 2021-02-11 PROCEDURE — 70496 CT ANGIOGRAPHY HEAD: CPT

## 2021-02-11 PROCEDURE — 85025 COMPLETE CBC W/AUTO DIFF WBC: CPT | Performed by: EMERGENCY MEDICINE

## 2021-02-11 PROCEDURE — 84484 ASSAY OF TROPONIN QUANT: CPT | Performed by: EMERGENCY MEDICINE

## 2021-02-11 PROCEDURE — 70450 CT HEAD/BRAIN W/O DYE: CPT | Mod: XS

## 2021-02-11 RX ORDER — ONDANSETRON 2 MG/ML
4 INJECTION INTRAMUSCULAR; INTRAVENOUS EVERY 6 HOURS PRN
Status: DISCONTINUED | OUTPATIENT
Start: 2021-02-11 | End: 2021-02-13 | Stop reason: HOSPADM

## 2021-02-11 RX ORDER — ATORVASTATIN CALCIUM 40 MG/1
40 TABLET, FILM COATED ORAL AT BEDTIME
Status: ON HOLD | COMMUNITY
Start: 2020-08-31 | End: 2021-02-13

## 2021-02-11 RX ORDER — ATORVASTATIN CALCIUM 40 MG/1
40 TABLET, FILM COATED ORAL AT BEDTIME
Status: DISCONTINUED | OUTPATIENT
Start: 2021-02-11 | End: 2021-02-12

## 2021-02-11 RX ORDER — FAMOTIDINE 20 MG/1
20 TABLET, FILM COATED ORAL DAILY PRN
COMMUNITY
Start: 2020-04-07 | End: 2021-03-15

## 2021-02-11 RX ORDER — POLYETHYLENE GLYCOL 3350 17 G/17G
17 POWDER, FOR SOLUTION ORAL DAILY PRN
Status: DISCONTINUED | OUTPATIENT
Start: 2021-02-11 | End: 2021-02-13 | Stop reason: HOSPADM

## 2021-02-11 RX ORDER — METOPROLOL TARTRATE 25 MG/1
12.5 TABLET, FILM COATED ORAL 2 TIMES DAILY
COMMUNITY
Start: 2020-10-23 | End: 2021-03-15

## 2021-02-11 RX ORDER — LANCING DEVICE/LANCETS
KIT MISCELLANEOUS DAILY
COMMUNITY
Start: 2020-01-28 | End: 2021-05-18

## 2021-02-11 RX ORDER — GABAPENTIN 100 MG/1
100 CAPSULE ORAL AT BEDTIME
Status: DISCONTINUED | OUTPATIENT
Start: 2021-02-11 | End: 2021-02-13 | Stop reason: HOSPADM

## 2021-02-11 RX ORDER — NITROGLYCERIN 0.4 MG/1
0.4 TABLET SUBLINGUAL EVERY 5 MIN PRN
COMMUNITY
Start: 2020-03-20 | End: 2021-03-15

## 2021-02-11 RX ORDER — PANTOPRAZOLE SODIUM 40 MG/1
40 TABLET, DELAYED RELEASE ORAL DAILY
COMMUNITY
Start: 2020-08-31 | End: 2021-03-15

## 2021-02-11 RX ORDER — GABAPENTIN 100 MG/1
100 CAPSULE ORAL AT BEDTIME
COMMUNITY
Start: 2020-04-07 | End: 2021-03-15

## 2021-02-11 RX ORDER — BLOOD SUGAR DIAGNOSTIC
1 STRIP MISCELLANEOUS DAILY
Status: ON HOLD | COMMUNITY
Start: 2020-04-07 | End: 2023-11-18

## 2021-02-11 RX ORDER — LISINOPRIL 5 MG/1
2.5 TABLET ORAL DAILY
COMMUNITY
Start: 2020-03-20 | End: 2021-03-15

## 2021-02-11 RX ORDER — HEPARIN SODIUM 5000 [USP'U]/.5ML
5000 INJECTION, SOLUTION INTRAVENOUS; SUBCUTANEOUS EVERY 12 HOURS
Status: DISCONTINUED | OUTPATIENT
Start: 2021-02-11 | End: 2021-02-13 | Stop reason: HOSPADM

## 2021-02-11 RX ORDER — CLOPIDOGREL BISULFATE 75 MG/1
75 TABLET ORAL ONCE
Status: COMPLETED | OUTPATIENT
Start: 2021-02-11 | End: 2021-02-11

## 2021-02-11 RX ORDER — CLOPIDOGREL BISULFATE 75 MG/1
75 TABLET ORAL DAILY
Status: DISCONTINUED | OUTPATIENT
Start: 2021-02-12 | End: 2021-02-13 | Stop reason: HOSPADM

## 2021-02-11 RX ORDER — HYDRALAZINE HYDROCHLORIDE 10 MG/1
10 TABLET, FILM COATED ORAL 4 TIMES DAILY PRN
Status: DISCONTINUED | OUTPATIENT
Start: 2021-02-11 | End: 2021-02-13 | Stop reason: HOSPADM

## 2021-02-11 RX ORDER — ONDANSETRON 4 MG/1
4 TABLET, ORALLY DISINTEGRATING ORAL EVERY 6 HOURS PRN
Status: DISCONTINUED | OUTPATIENT
Start: 2021-02-11 | End: 2021-02-13 | Stop reason: HOSPADM

## 2021-02-11 RX ORDER — IOPAMIDOL 755 MG/ML
500 INJECTION, SOLUTION INTRAVASCULAR ONCE
Status: COMPLETED | OUTPATIENT
Start: 2021-02-11 | End: 2021-02-11

## 2021-02-11 RX ORDER — ACETAMINOPHEN 325 MG/1
650 TABLET ORAL EVERY 4 HOURS PRN
Status: DISCONTINUED | OUTPATIENT
Start: 2021-02-11 | End: 2021-02-13 | Stop reason: HOSPADM

## 2021-02-11 RX ORDER — LANCETS
1 EACH MISCELLANEOUS DAILY
Status: ON HOLD | COMMUNITY
Start: 2020-08-31 | End: 2023-11-18

## 2021-02-11 RX ORDER — PANTOPRAZOLE SODIUM 40 MG/1
40 TABLET, DELAYED RELEASE ORAL DAILY
Status: DISCONTINUED | OUTPATIENT
Start: 2021-02-12 | End: 2021-02-13 | Stop reason: HOSPADM

## 2021-02-11 RX ADMIN — IOPAMIDOL 80 ML: 755 INJECTION, SOLUTION INTRAVENOUS at 16:24

## 2021-02-11 RX ADMIN — CLOPIDOGREL BISULFATE 75 MG: 75 TABLET ORAL at 18:36

## 2021-02-11 SDOH — HEALTH STABILITY: MENTAL HEALTH: HOW OFTEN DO YOU HAVE A DRINK CONTAINING ALCOHOL?: NEVER

## 2021-02-11 ASSESSMENT — VISUAL ACUITY: OU: NORMAL ACUITY

## 2021-02-11 NOTE — ED TRIAGE NOTES
Pt comes in via EMS for multiple complaints. Pt states he has bilateral upper and lower extremity numbness and pain. Pt complains of chest pain. Pts wife has also noticed a L sided facial droop. All of these symptoms started a few days ago per EMS.

## 2021-02-11 NOTE — ED NOTES
"Pt ambulated with standby assist and walker from room 1 down to room 5 and back to room 1. Pt was light headed as he sat up, got his bearings and then went for the walk. Pt stating still feeling \"light headed and then some\" while ambulating. Saira Field RN    "

## 2021-02-11 NOTE — ED PROVIDER NOTES
"  History     Chief Complaint   Patient presents with     Numbness     HPI  Brannon Boggs is a 81 year old male who presents to the ER via EMS for multiple complaints.  He says that he has been \"feeling like I am going downhill\".  Today he was trying to stand up when he fell back onto his bed because he was feeling lightheaded and dizzy.  He also complains of bilateral upper and lower extremity numbness and pain going on for the last few days.  He complains of a pressure in his chest that has been ongoing for some time and is not new or worsened today.  His wife noticed a left-sided facial droop a few days ago.  He has not been slurring his speech.  He has no known history of stroke.  [facility] but [facility] \"[facility] not [facility] and just sent me home\".  Has not changed any medications or missed any of his medication doses.  He has not been running a fever.  Has not been vomiting.  No cough or shortness of breath.      Allergies:  No Known Allergies    Problem List:    There are no active problems to display for this patient.       Past Medical History:    History reviewed. No pertinent past medical history.    Past Surgical History:    History reviewed. No pertinent surgical history.    Family History:    History reviewed. No pertinent family history.    Social History:  Marital Status:   [2]  Social History     Tobacco Use     Smoking status: Former Smoker     Smokeless tobacco: Never Used   Substance Use Topics     Alcohol use: Not Currently     Frequency: Never     Drug use: Not Currently        Medications:    No current outpatient medications on file.        Review of Systems   All other systems reviewed and are negative.      Physical Exam   BP: (!) 173/70  Pulse: 60  Temp: 97.3  F (36.3  C)  Resp: 16  Weight: 108.9 kg (240 lb)  SpO2: 99 %  Lying Orthostatic BP: 171/66  Lying Orthostatic Pulse: 68 bpm  Sitting Orthostatic BP: 153/82  Sitting Orthostatic Pulse: 70 " bpm  Standing Orthostatic BP: 125/79  Standing Orthostatic Pulse: 83 bpm      Physical Exam  Vitals signs and nursing note reviewed.   Constitutional:       General: He is not in acute distress.     Appearance: He is not diaphoretic.   HENT:      Head: Normocephalic and atraumatic.      Right Ear: Tympanic membrane normal.      Left Ear: Tympanic membrane normal.   Eyes:      General: No scleral icterus.     Extraocular Movements: Extraocular movements intact.      Conjunctiva/sclera: Conjunctivae normal.      Pupils: Pupils are equal, round, and reactive to light.   Neck:      Musculoskeletal: Normal range of motion.   Cardiovascular:      Rate and Rhythm: Regular rhythm.      Heart sounds: Normal heart sounds.   Pulmonary:      Effort: Pulmonary effort is normal. No respiratory distress.      Breath sounds: Normal breath sounds.   Abdominal:      General: Bowel sounds are normal.      Palpations: Abdomen is soft.      Tenderness: There is no abdominal tenderness.   Musculoskeletal:         General: No tenderness.   Skin:     General: Skin is warm.      Findings: No rash.   Neurological:      Mental Status: He is alert.      Cranial Nerves: No cranial nerve deficit.      Motor: No weakness.   Psychiatric:         Mood and Affect: Mood normal.         Behavior: Behavior normal.         ED Course        Procedures               EKG Interpretation:      Interpreted by Cass Gee DO  Time reviewed: 1416  Symptoms at time of EKG: near syncope   Rhythm: paced  Rate: Normal  Ectopy: none  ST Segments/ T Waves: No acute ischemic changes  Comparison to prior: No old EKG available    Clinical Impression: paced rhythm                Critical Care time:  none               Results for orders placed or performed during the hospital encounter of 02/11/21 (from the past 24 hour(s))   CBC with platelets differential   Result Value Ref Range    WBC 18.8 (H) 4.0 - 11.0 10e9/L    RBC Count 4.65 4.4 - 5.9 10e12/L     Hemoglobin 14.4 13.3 - 17.7 g/dL    Hematocrit 42.4 40.0 - 53.0 %    MCV 91 78 - 100 fl    MCH 31.0 26.5 - 33.0 pg    MCHC 34.0 31.5 - 36.5 g/dL    RDW 13.1 10.0 - 15.0 %    Platelet Count 185 150 - 450 10e9/L    Diff Method Automated Method     % Neutrophils 34.9 %    % Lymphocytes 58.4 %    % Monocytes 5.1 %    % Eosinophils 0.9 %    % Basophils 0.4 %    % Immature Granulocytes 0.3 %    Nucleated RBCs 0 0 /100    Absolute Neutrophil 6.6 1.6 - 8.3 10e9/L    Absolute Lymphocytes 11.0 (H) 0.8 - 5.3 10e9/L    Absolute Monocytes 1.0 0.0 - 1.3 10e9/L    Absolute Basophils 0.1 0.0 - 0.2 10e9/L    Abs Immature Granulocytes 0.1 0 - 0.4 10e9/L    Absolute Nucleated RBC 0.0    Comprehensive metabolic panel   Result Value Ref Range    Sodium 134 133 - 144 mmol/L    Potassium 4.6 3.4 - 5.3 mmol/L    Chloride 103 94 - 109 mmol/L    Carbon Dioxide 25 20 - 32 mmol/L    Anion Gap 6 3 - 14 mmol/L    Glucose 171 (H) 70 - 99 mg/dL    Urea Nitrogen 13 7 - 30 mg/dL    Creatinine 0.91 0.66 - 1.25 mg/dL    GFR Estimate 79 >60 mL/min/[1.73_m2]    GFR Estimate If Black >90 >60 mL/min/[1.73_m2]    Calcium 8.8 8.5 - 10.1 mg/dL    Bilirubin Total 0.6 0.2 - 1.3 mg/dL    Albumin 3.7 3.4 - 5.0 g/dL    Protein Total 7.2 6.8 - 8.8 g/dL    Alkaline Phosphatase 125 40 - 150 U/L    ALT 21 0 - 70 U/L    AST 11 0 - 45 U/L   Troponin I   Result Value Ref Range    Troponin I ES <0.015 0.000 - 0.045 ug/L   Nt probnp inpatient (BNP)   Result Value Ref Range    N-Terminal Pro BNP Inpatient 49 0 - 1,800 pg/mL   CT Head w/o Contrast    Narrative    CT OF THE HEAD WITHOUT CONTRAST 2/11/2021 4:27 PM     COMPARISON: None.    HISTORY: Dizziness, non-specific.    TECHNIQUE: 5 mm thick axial CT images of the head were acquired  without IV contrast material.    FINDINGS: There is moderate diffuse cerebral volume loss. There are  subtle patchy areas of decreased density in the cerebral white matter  bilaterally that are consistent with sequela of chronic small  vessel  ischemic disease.    The ventricles and basal cisterns are within normal limits in  configuration given the degree of cerebral volume loss.  There is no  midline shift. There are no extra-axial fluid collections.    No intracranial hemorrhage, mass or recent infarct.    The visualized paranasal sinuses are well-aerated. There is no  mastoiditis. There are no fractures of the visualized bones.      Impression    IMPRESSION: Diffuse cerebral volume loss and cerebral white matter  changes consistent with chronic small vessel ischemic disease. No  evidence for acute intracranial pathology.      Radiation dose for this scan was reduced using automated exposure  control, adjustment of the mA and/or kV according to patient size, or  iterative reconstruction technique    DENZEL SALEH MD   CTA Head Neck with Contrast    Narrative    CT ANGIOGRAM OF THE HEAD AND NECK WITHOUT AND WITH CONTRAST  2/11/2021  4:45 PM     COMPARISON: None    HISTORY: Dizziness, persistent/recurrent, cardiac or vascular cause  suspected.    TECHNIQUE:  Precontrast localizing scans were followed by CT  angiography with an injection of Isovue 370, 80mL nonionic intravenous  contrast material with scans through the head and neck.  Images were  transferred to a separate 3-D workstation where multiplanar  reformations and 3-D images were created.  Estimates of carotid  stenoses are made relative to the distal internal carotid artery  diameters except as noted.      FINDINGS:   Neck CTA: The common carotid arteries bilaterally are patent without  stenosis. There is calcified atherosclerotic plaque at the origins of  the internal carotid arteries on both sides that does not result in  stenosis on either side. There is age indeterminate complete occlusion  of the left vertebral artery from its origin to the basilar artery.  The right vertebral artery is widely patent and unremarkable.    Head CTA: There is a tiny focus of calcification in the  basilar tip  that could represent native calcified plaque but embolized  atherosclerotic plaque cannot be completely excluded. The basilar  artery is otherwise patent and unremarkable. There is calcified plaque  of the intracranial distal internal carotid arteries bilaterally that  result in moderate stenosis at the junction between the petrous and  supraclinoid segments on the right but no significant vascular  narrowing on the left. The bilateral anterior cerebral, bilateral  middle cerebral and bilateral posterior cerebral arteries are patent  and unremarkable. The anterior communicating artery is patent.      Impression    IMPRESSION:  1. Plaque without stenosis at the origins of the internal carotid  arteries on both sides.  2. Age-indeterminate complete occlusion of the left vertebral artery  from its origin to the basilar artery.  3. Focus of calcification in the basilar tip that could represent  native atherosclerotic plaque; however, embolized calcified plaque  cannot be excluded.  4. Moderate atherosclerotic narrowing at the junction between the  cavernous and supraclinoid segments of the intracranial distal right  internal carotid artery.  5. Otherwise, normal neck and head CTA.      Radiation dose for this scan was reduced using automated exposure  control, adjustment of the mA and/or kV according to patient size, or  iterative reconstruction technique    DENZEL SALEH MD   Asymptomatic SARS-CoV-2 COVID-19 Virus (Coronavirus) by PCR    Specimen: Nasopharyngeal   Result Value Ref Range    SARS-CoV-2 Virus Specimen Source Nasopharyngeal     SARS-CoV-2 PCR Result NEGATIVE     SARS-CoV-2 PCR Comment (Note)    UA reflex to Microscopic   Result Value Ref Range    Color Urine Straw     Appearance Urine Clear     Glucose Urine Negative NEG^Negative mg/dL    Bilirubin Urine Negative NEG^Negative    Ketones Urine Negative NEG^Negative mg/dL    Specific Gravity Urine 1.024 1.003 - 1.035    Blood Urine Negative  "NEG^Negative    pH Urine 6.0 5.0 - 7.0 pH    Protein Albumin Urine Negative NEG^Negative mg/dL    Urobilinogen mg/dL 0.0 0.0 - 2.0 mg/dL    Nitrite Urine Negative NEG^Negative    Leukocyte Esterase Urine Negative NEG^Negative    Source Midstream Urine        Medications   sodium chloride (PF) 0.9% PF flush 100 mL (99 mLs Intracatheter Given 2/11/21 1625)   sodium chloride (PF) 0.9% PF flush 3 mL (10 mLs Intravenous Given 2/11/21 1625)   iopamidol (ISOVUE-370) solution 500 mL (80 mLs Intravenous Given 2/11/21 1624)   clopidogrel (PLAVIX) tablet 75 mg (75 mg Oral Given 2/11/21 1836)       Assessments & Plan (with Medical Decision Making)  Brannon is an 81-year-old male with past medical history of diabetes, ischemic cardiomyopathy, peripheral neuropathy, and hypertension who presents to the ER with several days of upper and lower extremity numbness and tingling as well as left-sided facial droop.  All symptoms started over the last few days, unclear exact time of onset but more than 24 hours ago.  He says that he just does not feel right and that something is wrong.  [facility] but [facility] did not [facility] anything and sent him home.  He has not had any changes in his medications  Patient arrived by EMS, vitally stable and afebrile.  He was neurologically intact, no focal neuro deficits, weakness, slurred speech.  Will check lab work and an EKG.  EKG showed a paced rhythm without any acute ST changes or ectopy.  Pacemaker was interrogated and did not show abnormalities.  Troponin was undetectable and the remainder of his lab results were within normal limits other than an elevated white blood cell count.  When I spoke with his wife, she says that he has had an elevated white blood cell count in the past due to \"exposure to agent orange\".  Patient was able to get up and ambulate into the department with the assistance of a walker, which he does use at home.  He did not have any " ataxia.  Patient says that he continues to feel dizzy and lightheaded.  Orthostatics were negative.  At this time chose to proceed with head CT and CTA to evaluate  Imaging results as above.  Patient does have some significant atherosclerosis, and possible subacute stroke.  Since he has minimal if any deficit, and is well outside the window for TPA or endovascular retrieval, did not call a code stroke.  Called to speak with [facility] since the patient normally doctors through their system, they did not have any beds available for admission and were agreeable to the patient being admitted where space is available.  Did call stroke neurology at [facility] for consultation.  Spoke with Dr. Loera and relayed imaging findings and patient's symptoms.  Since he does have a pacemaker, we are not able to get an MRI at this facility.  Asked if he would need an MRI within the next 24 to 48 hours, or if this could be arranged on an outpatient basis.  If it could have been arranged outpatient and done later, could consider keeping the patient at this facility for observation and discharge planning in the morning.  Dr. Koch said that she would prefer to have the patient get an MRI, and should come down to the [facility] or [facility] where they better equipped to get an MRI on patient with an implanted pacemaker.  Patient will be transferred down via BLS.  He was transported from the department in no acute distress     I have reviewed the nursing notes.    I have reviewed the findings, diagnosis, plan and need for follow up with the patient.       ED to Inpatient Handoff:    Discussed with Dr. Loera (neurology) at [phone]  Patient accepted for Inpatient Stay  Pending studies include COVID swab  Code Status: Not Addressed           Discharge Medication List as of 2/11/2021  7:21 PM          Final diagnoses:   Cerebrovascular accident (CVA), unspecified mechanism (H)        2/11/2021   [facility] EMERGENCY DEPT     Cass Gee DO  02/11/21 4738

## 2021-02-12 ENCOUNTER — ANCILLARY PROCEDURE (OUTPATIENT)
Dept: CARDIOLOGY | Facility: CLINIC | Age: 81
DRG: 068 | End: 2021-02-12
Attending: PSYCHIATRY & NEUROLOGY
Payer: COMMERCIAL

## 2021-02-12 ENCOUNTER — APPOINTMENT (OUTPATIENT)
Dept: CARDIOLOGY | Facility: CLINIC | Age: 81
DRG: 068 | End: 2021-02-12
Attending: STUDENT IN AN ORGANIZED HEALTH CARE EDUCATION/TRAINING PROGRAM
Payer: COMMERCIAL

## 2021-02-12 ENCOUNTER — APPOINTMENT (OUTPATIENT)
Dept: OCCUPATIONAL THERAPY | Facility: CLINIC | Age: 81
DRG: 068 | End: 2021-02-12
Attending: STUDENT IN AN ORGANIZED HEALTH CARE EDUCATION/TRAINING PROGRAM
Payer: COMMERCIAL

## 2021-02-12 ENCOUNTER — APPOINTMENT (OUTPATIENT)
Dept: MRI IMAGING | Facility: CLINIC | Age: 81
DRG: 068 | End: 2021-02-12
Attending: STUDENT IN AN ORGANIZED HEALTH CARE EDUCATION/TRAINING PROGRAM
Payer: COMMERCIAL

## 2021-02-12 ENCOUNTER — APPOINTMENT (OUTPATIENT)
Dept: GENERAL RADIOLOGY | Facility: CLINIC | Age: 81
DRG: 068 | End: 2021-02-12
Attending: STUDENT IN AN ORGANIZED HEALTH CARE EDUCATION/TRAINING PROGRAM
Payer: COMMERCIAL

## 2021-02-12 ENCOUNTER — APPOINTMENT (OUTPATIENT)
Dept: PHYSICAL THERAPY | Facility: CLINIC | Age: 81
DRG: 068 | End: 2021-02-12
Attending: STUDENT IN AN ORGANIZED HEALTH CARE EDUCATION/TRAINING PROGRAM
Payer: COMMERCIAL

## 2021-02-12 ENCOUNTER — APPOINTMENT (OUTPATIENT)
Dept: EDUCATION SERVICES | Facility: CLINIC | Age: 81
End: 2021-02-12
Attending: STUDENT IN AN ORGANIZED HEALTH CARE EDUCATION/TRAINING PROGRAM
Payer: COMMERCIAL

## 2021-02-12 DIAGNOSIS — I44.30 AV BLOCK: ICD-10-CM

## 2021-02-12 DIAGNOSIS — I44.30 AV BLOCK: Primary | ICD-10-CM

## 2021-02-12 LAB
ALBUMIN SERPL-MCNC: 3.6 G/DL (ref 3.4–5)
ALP SERPL-CCNC: 104 U/L (ref 40–150)
ALT SERPL W P-5'-P-CCNC: 21 U/L (ref 0–70)
ANION GAP SERPL CALCULATED.3IONS-SCNC: 6 MMOL/L (ref 3–14)
APTT PPP: 29 SEC (ref 22–37)
AST SERPL W P-5'-P-CCNC: 11 U/L (ref 0–45)
BILIRUB DIRECT SERPL-MCNC: 0.2 MG/DL (ref 0–0.2)
BILIRUB SERPL-MCNC: 0.6 MG/DL (ref 0.2–1.3)
BUN SERPL-MCNC: 12 MG/DL (ref 7–30)
CALCIUM SERPL-MCNC: 9.3 MG/DL (ref 8.5–10.1)
CHLORIDE SERPL-SCNC: 106 MMOL/L (ref 94–109)
CHOLEST SERPL-MCNC: 85 MG/DL
CO2 SERPL-SCNC: 24 MMOL/L (ref 20–32)
CREAT SERPL-MCNC: 0.83 MG/DL (ref 0.66–1.25)
ERYTHROCYTE [DISTWIDTH] IN BLOOD BY AUTOMATED COUNT: 13.2 % (ref 10–15)
GFR SERPL CREATININE-BSD FRML MDRD: 83 ML/MIN/{1.73_M2}
GLUCOSE BLDC GLUCOMTR-MCNC: 111 MG/DL (ref 70–99)
GLUCOSE BLDC GLUCOMTR-MCNC: 123 MG/DL (ref 70–99)
GLUCOSE BLDC GLUCOMTR-MCNC: 129 MG/DL (ref 70–99)
GLUCOSE BLDC GLUCOMTR-MCNC: 141 MG/DL (ref 70–99)
GLUCOSE BLDC GLUCOMTR-MCNC: 151 MG/DL (ref 70–99)
GLUCOSE BLDC GLUCOMTR-MCNC: 177 MG/DL (ref 70–99)
GLUCOSE SERPL-MCNC: 118 MG/DL (ref 70–99)
HBA1C MFR BLD: 7.4 % (ref 0–5.6)
HCT VFR BLD AUTO: 43.3 % (ref 40–53)
HDLC SERPL-MCNC: 42 MG/DL
HGB BLD-MCNC: 14.6 G/DL (ref 13.3–17.7)
INR PPP: 1.1 (ref 0.86–1.14)
LDLC SERPL CALC-MCNC: 21 MG/DL
MCH RBC QN AUTO: 31.4 PG (ref 26.5–33)
MCHC RBC AUTO-ENTMCNC: 33.7 G/DL (ref 31.5–36.5)
MCV RBC AUTO: 93 FL (ref 78–100)
MDC_IDC_LEAD_IMPLANT_DT: NORMAL
MDC_IDC_LEAD_LOCATION: NORMAL
MDC_IDC_LEAD_LOCATION_DETAIL_1: NORMAL
MDC_IDC_LEAD_MFG: NORMAL
MDC_IDC_LEAD_MODEL: NORMAL
MDC_IDC_LEAD_POLARITY_TYPE: NORMAL
MDC_IDC_LEAD_SERIAL: NORMAL
MDC_IDC_MSMT_BATTERY_DTM: NORMAL
MDC_IDC_MSMT_BATTERY_REMAINING_LONGEVITY: 120 MO
MDC_IDC_MSMT_BATTERY_STATUS: NORMAL
MDC_IDC_MSMT_BATTERY_STATUS: NORMAL
MDC_IDC_MSMT_LEADCHNL_RA_IMPEDANCE_VALUE: 490 OHM
MDC_IDC_MSMT_LEADCHNL_RA_IMPEDANCE_VALUE: 490 OHM
MDC_IDC_MSMT_LEADCHNL_RA_PACING_THRESHOLD_AMPLITUDE: 0.4 V
MDC_IDC_MSMT_LEADCHNL_RA_PACING_THRESHOLD_AMPLITUDE: 0.4 V
MDC_IDC_MSMT_LEADCHNL_RA_PACING_THRESHOLD_PULSEWIDTH: 0.4 MS
MDC_IDC_MSMT_LEADCHNL_RA_PACING_THRESHOLD_PULSEWIDTH: 0.4 MS
MDC_IDC_MSMT_LEADCHNL_RA_SENSING_INTR_AMPL: 6.2 MV
MDC_IDC_MSMT_LEADCHNL_RA_SENSING_INTR_AMPL: 6.2 MV
MDC_IDC_MSMT_LEADCHNL_RV_IMPEDANCE_VALUE: 432 OHM
MDC_IDC_MSMT_LEADCHNL_RV_IMPEDANCE_VALUE: 432 OHM
MDC_IDC_MSMT_LEADCHNL_RV_PACING_THRESHOLD_AMPLITUDE: 0.5 V
MDC_IDC_MSMT_LEADCHNL_RV_PACING_THRESHOLD_AMPLITUDE: 0.5 V
MDC_IDC_MSMT_LEADCHNL_RV_PACING_THRESHOLD_PULSEWIDTH: 0.4 MS
MDC_IDC_MSMT_LEADCHNL_RV_PACING_THRESHOLD_PULSEWIDTH: 0.4 MS
MDC_IDC_MSMT_LEADCHNL_RV_SENSING_INTR_AMPL: 9.2 MV
MDC_IDC_MSMT_LEADCHNL_RV_SENSING_INTR_AMPL: 9.2 MV
MDC_IDC_PG_IMPLANT_DTM: NORMAL
MDC_IDC_PG_IMPLANT_DTM: NORMAL
MDC_IDC_PG_MFG: NORMAL
MDC_IDC_PG_MFG: NORMAL
MDC_IDC_PG_MODEL: NORMAL
MDC_IDC_PG_MODEL: NORMAL
MDC_IDC_PG_SERIAL: NORMAL
MDC_IDC_PG_SERIAL: NORMAL
MDC_IDC_PG_TYPE: NORMAL
MDC_IDC_PG_TYPE: NORMAL
MDC_IDC_SESS_CLINIC_NAME: NORMAL
MDC_IDC_SESS_CLINIC_NAME: NORMAL
MDC_IDC_SESS_DTM: NORMAL
MDC_IDC_SESS_DTM: NORMAL
MDC_IDC_SESS_TYPE: NORMAL
MDC_IDC_SESS_TYPE: NORMAL
MDC_IDC_SET_BRADY_AT_MODE_SWITCH_MODE: NORMAL
MDC_IDC_SET_BRADY_AT_MODE_SWITCH_MODE: NORMAL
MDC_IDC_SET_BRADY_AT_MODE_SWITCH_RATE: 170 {BEATS}/MIN
MDC_IDC_SET_BRADY_AT_MODE_SWITCH_RATE: 170 {BEATS}/MIN
MDC_IDC_SET_BRADY_LOWRATE: 60 {BEATS}/MIN
MDC_IDC_SET_BRADY_LOWRATE: 60 {BEATS}/MIN
MDC_IDC_SET_BRADY_MAX_SENSOR_RATE: 130 {BEATS}/MIN
MDC_IDC_SET_BRADY_MAX_SENSOR_RATE: 130 {BEATS}/MIN
MDC_IDC_SET_BRADY_MAX_TRACKING_RATE: 130 {BEATS}/MIN
MDC_IDC_SET_BRADY_MAX_TRACKING_RATE: 130 {BEATS}/MIN
MDC_IDC_SET_BRADY_MODE: NORMAL
MDC_IDC_SET_BRADY_MODE: NORMAL
MDC_IDC_SET_BRADY_PAV_DELAY_HIGH: 110 MS
MDC_IDC_SET_BRADY_PAV_DELAY_HIGH: 110 MS
MDC_IDC_SET_BRADY_PAV_DELAY_LOW: 220 MS
MDC_IDC_SET_BRADY_PAV_DELAY_LOW: 220 MS
MDC_IDC_SET_BRADY_SAV_DELAY_HIGH: 110 MS
MDC_IDC_SET_BRADY_SAV_DELAY_HIGH: 110 MS
MDC_IDC_SET_BRADY_SAV_DELAY_LOW: 220 MS
MDC_IDC_SET_BRADY_SAV_DELAY_LOW: 220 MS
MDC_IDC_SET_LEADCHNL_RA_PACING_AMPLITUDE: 2 V
MDC_IDC_SET_LEADCHNL_RA_PACING_AMPLITUDE: 2 V
MDC_IDC_SET_LEADCHNL_RA_PACING_CAPTURE_MODE: NORMAL
MDC_IDC_SET_LEADCHNL_RA_PACING_CAPTURE_MODE: NORMAL
MDC_IDC_SET_LEADCHNL_RA_PACING_POLARITY: NORMAL
MDC_IDC_SET_LEADCHNL_RA_PACING_POLARITY: NORMAL
MDC_IDC_SET_LEADCHNL_RA_PACING_PULSEWIDTH: 0.4 MS
MDC_IDC_SET_LEADCHNL_RA_PACING_PULSEWIDTH: 0.4 MS
MDC_IDC_SET_LEADCHNL_RA_SENSING_ADAPTATION_MODE: NORMAL
MDC_IDC_SET_LEADCHNL_RA_SENSING_ADAPTATION_MODE: NORMAL
MDC_IDC_SET_LEADCHNL_RA_SENSING_POLARITY: NORMAL
MDC_IDC_SET_LEADCHNL_RA_SENSING_POLARITY: NORMAL
MDC_IDC_SET_LEADCHNL_RA_SENSING_SENSITIVITY: 0.25 MV
MDC_IDC_SET_LEADCHNL_RA_SENSING_SENSITIVITY: 0.25 MV
MDC_IDC_SET_LEADCHNL_RV_PACING_AMPLITUDE: 1.2 V
MDC_IDC_SET_LEADCHNL_RV_PACING_AMPLITUDE: 1.2 V
MDC_IDC_SET_LEADCHNL_RV_PACING_CAPTURE_MODE: NORMAL
MDC_IDC_SET_LEADCHNL_RV_PACING_CAPTURE_MODE: NORMAL
MDC_IDC_SET_LEADCHNL_RV_PACING_POLARITY: NORMAL
MDC_IDC_SET_LEADCHNL_RV_PACING_POLARITY: NORMAL
MDC_IDC_SET_LEADCHNL_RV_PACING_PULSEWIDTH: 0.4 MS
MDC_IDC_SET_LEADCHNL_RV_PACING_PULSEWIDTH: 0.4 MS
MDC_IDC_SET_LEADCHNL_RV_SENSING_ADAPTATION_MODE: NORMAL
MDC_IDC_SET_LEADCHNL_RV_SENSING_ADAPTATION_MODE: NORMAL
MDC_IDC_SET_LEADCHNL_RV_SENSING_POLARITY: NORMAL
MDC_IDC_SET_LEADCHNL_RV_SENSING_POLARITY: NORMAL
MDC_IDC_SET_LEADCHNL_RV_SENSING_SENSITIVITY: 1.5 MV
MDC_IDC_SET_LEADCHNL_RV_SENSING_SENSITIVITY: 1.5 MV
MDC_IDC_SET_ZONE_DETECTION_INTERVAL: 375 MS
MDC_IDC_SET_ZONE_DETECTION_INTERVAL: 375 MS
MDC_IDC_SET_ZONE_TYPE: NORMAL
MDC_IDC_SET_ZONE_TYPE: NORMAL
MDC_IDC_SET_ZONE_VENDOR_TYPE: NORMAL
MDC_IDC_SET_ZONE_VENDOR_TYPE: NORMAL
MDC_IDC_STAT_BRADY_DTM_END: NORMAL
MDC_IDC_STAT_BRADY_DTM_START: NORMAL
MDC_IDC_STAT_BRADY_RA_PERCENT_PACED: 67 %
MDC_IDC_STAT_BRADY_RV_PERCENT_PACED: 100 %
MDC_IDC_STAT_EPISODE_RECENT_COUNT: 0
MDC_IDC_STAT_EPISODE_RECENT_COUNT: 0
MDC_IDC_STAT_EPISODE_RECENT_COUNT_DTM_END: NORMAL
MDC_IDC_STAT_EPISODE_RECENT_COUNT_DTM_END: NORMAL
MDC_IDC_STAT_EPISODE_RECENT_COUNT_DTM_START: NORMAL
MDC_IDC_STAT_EPISODE_RECENT_COUNT_DTM_START: NORMAL
MDC_IDC_STAT_EPISODE_TOTAL_COUNT: 0
MDC_IDC_STAT_EPISODE_TOTAL_COUNT: 0
MDC_IDC_STAT_EPISODE_TOTAL_COUNT_DTM_END: NORMAL
MDC_IDC_STAT_EPISODE_TOTAL_COUNT_DTM_END: NORMAL
MDC_IDC_STAT_EPISODE_TYPE: NORMAL
MDC_IDC_STAT_EPISODE_VENDOR_TYPE: NORMAL
NONHDLC SERPL-MCNC: 43 MG/DL
PLATELET # BLD AUTO: 173 10E9/L (ref 150–450)
POTASSIUM SERPL-SCNC: 3.9 MMOL/L (ref 3.4–5.3)
PROT SERPL-MCNC: 6.8 G/DL (ref 6.8–8.8)
RBC # BLD AUTO: 4.65 10E12/L (ref 4.4–5.9)
SODIUM SERPL-SCNC: 136 MMOL/L (ref 133–144)
T4 FREE SERPL-MCNC: 0.84 NG/DL (ref 0.76–1.46)
TRIGL SERPL-MCNC: 108 MG/DL
TSH SERPL DL<=0.005 MIU/L-ACNC: 4.28 MU/L (ref 0.4–4)
WBC # BLD AUTO: 18.7 10E9/L (ref 4–11)

## 2021-02-12 PROCEDURE — 71046 X-RAY EXAM CHEST 2 VIEWS: CPT | Mod: 26 | Performed by: RADIOLOGY

## 2021-02-12 PROCEDURE — 999N001017 HC STATISTIC GLUCOSE BY METER IP

## 2021-02-12 PROCEDURE — 93286 PERI-PX EVAL PM/LDLS PM IP: CPT

## 2021-02-12 PROCEDURE — 120N000002 HC R&B MED SURG/OB UMMC

## 2021-02-12 PROCEDURE — 93286 PERI-PX EVAL PM/LDLS PM IP: CPT | Mod: 26 | Performed by: INTERNAL MEDICINE

## 2021-02-12 PROCEDURE — 85730 THROMBOPLASTIN TIME PARTIAL: CPT | Performed by: PSYCHIATRY & NEUROLOGY

## 2021-02-12 PROCEDURE — 250N000011 HC RX IP 250 OP 636: Performed by: STUDENT IN AN ORGANIZED HEALTH CARE EDUCATION/TRAINING PROGRAM

## 2021-02-12 PROCEDURE — 85027 COMPLETE CBC AUTOMATED: CPT | Performed by: PSYCHIATRY & NEUROLOGY

## 2021-02-12 PROCEDURE — 255N000002 HC RX 255 OP 636: Performed by: INTERNAL MEDICINE

## 2021-02-12 PROCEDURE — 84439 ASSAY OF FREE THYROXINE: CPT | Performed by: PSYCHIATRY & NEUROLOGY

## 2021-02-12 PROCEDURE — 80076 HEPATIC FUNCTION PANEL: CPT | Performed by: PSYCHIATRY & NEUROLOGY

## 2021-02-12 PROCEDURE — 36415 COLL VENOUS BLD VENIPUNCTURE: CPT | Performed by: PSYCHIATRY & NEUROLOGY

## 2021-02-12 PROCEDURE — 999N000226 HC STATISTIC SLP IP EVAL DEFER

## 2021-02-12 PROCEDURE — 97535 SELF CARE MNGMENT TRAINING: CPT | Mod: GO

## 2021-02-12 PROCEDURE — 97165 OT EVAL LOW COMPLEX 30 MIN: CPT | Mod: GO

## 2021-02-12 PROCEDURE — 93306 TTE W/DOPPLER COMPLETE: CPT | Mod: 26 | Performed by: INTERNAL MEDICINE

## 2021-02-12 PROCEDURE — 99207 MR BRAIN FOR STROKE COMPLETE W/O & W CONTRAST: CPT | Mod: 26 | Performed by: RADIOLOGY

## 2021-02-12 PROCEDURE — 255N000002 HC RX 255 OP 636: Performed by: PSYCHIATRY & NEUROLOGY

## 2021-02-12 PROCEDURE — 999N000208 ECHOCARDIOGRAM COMPLETE

## 2021-02-12 PROCEDURE — 80061 LIPID PANEL: CPT | Performed by: PSYCHIATRY & NEUROLOGY

## 2021-02-12 PROCEDURE — 80048 BASIC METABOLIC PNL TOTAL CA: CPT | Performed by: PSYCHIATRY & NEUROLOGY

## 2021-02-12 PROCEDURE — 70549 MR ANGIOGRAPH NECK W/O&W/DYE: CPT | Mod: 26 | Performed by: RADIOLOGY

## 2021-02-12 PROCEDURE — 99223 1ST HOSP IP/OBS HIGH 75: CPT | Mod: GC | Performed by: PSYCHIATRY & NEUROLOGY

## 2021-02-12 PROCEDURE — A9585 GADOBUTROL INJECTION: HCPCS | Performed by: PSYCHIATRY & NEUROLOGY

## 2021-02-12 PROCEDURE — 83036 HEMOGLOBIN GLYCOSYLATED A1C: CPT | Performed by: PSYCHIATRY & NEUROLOGY

## 2021-02-12 PROCEDURE — 97116 GAIT TRAINING THERAPY: CPT | Mod: GP

## 2021-02-12 PROCEDURE — 84443 ASSAY THYROID STIM HORMONE: CPT | Performed by: PSYCHIATRY & NEUROLOGY

## 2021-02-12 PROCEDURE — 70553 MRI BRAIN STEM W/O & W/DYE: CPT | Mod: 26 | Performed by: RADIOLOGY

## 2021-02-12 PROCEDURE — 250N000013 HC RX MED GY IP 250 OP 250 PS 637: Performed by: STUDENT IN AN ORGANIZED HEALTH CARE EDUCATION/TRAINING PROGRAM

## 2021-02-12 PROCEDURE — 71046 X-RAY EXAM CHEST 2 VIEWS: CPT

## 2021-02-12 PROCEDURE — 70553 MRI BRAIN STEM W/O & W/DYE: CPT

## 2021-02-12 PROCEDURE — 85610 PROTHROMBIN TIME: CPT | Performed by: PSYCHIATRY & NEUROLOGY

## 2021-02-12 PROCEDURE — 97161 PT EVAL LOW COMPLEX 20 MIN: CPT | Mod: GP

## 2021-02-12 RX ORDER — GADOBUTROL 604.72 MG/ML
10 INJECTION INTRAVENOUS ONCE
Status: COMPLETED | OUTPATIENT
Start: 2021-02-12 | End: 2021-02-12

## 2021-02-12 RX ORDER — DEXTROSE MONOHYDRATE 25 G/50ML
25-50 INJECTION, SOLUTION INTRAVENOUS
Status: DISCONTINUED | OUTPATIENT
Start: 2021-02-12 | End: 2021-02-13 | Stop reason: HOSPADM

## 2021-02-12 RX ORDER — NICOTINE POLACRILEX 4 MG
15-30 LOZENGE BUCCAL
Status: DISCONTINUED | OUTPATIENT
Start: 2021-02-12 | End: 2021-02-13 | Stop reason: HOSPADM

## 2021-02-12 RX ORDER — ACYCLOVIR 200 MG/1
12 CAPSULE ORAL ONCE
Status: DISCONTINUED | OUTPATIENT
Start: 2021-02-12 | End: 2021-02-13 | Stop reason: HOSPADM

## 2021-02-12 RX ADMIN — METFORMIN HYDROCHLORIDE 500 MG: 500 TABLET ORAL at 18:08

## 2021-02-12 RX ADMIN — ACETAMINOPHEN 650 MG: 325 TABLET, FILM COATED ORAL at 08:32

## 2021-02-12 RX ADMIN — HEPARIN SODIUM 5000 UNITS: 5000 INJECTION, SOLUTION INTRAVENOUS; SUBCUTANEOUS at 00:23

## 2021-02-12 RX ADMIN — ACETAMINOPHEN 650 MG: 325 TABLET, FILM COATED ORAL at 22:55

## 2021-02-12 RX ADMIN — ATORVASTATIN CALCIUM 40 MG: 40 TABLET, FILM COATED ORAL at 00:23

## 2021-02-12 RX ADMIN — ACETAMINOPHEN 650 MG: 325 TABLET, FILM COATED ORAL at 03:16

## 2021-02-12 RX ADMIN — HUMAN ALBUMIN MICROSPHERES AND PERFLUTREN 6 ML: 10; .22 INJECTION, SOLUTION INTRAVENOUS at 10:00

## 2021-02-12 RX ADMIN — GABAPENTIN 100 MG: 100 CAPSULE ORAL at 22:00

## 2021-02-12 RX ADMIN — METFORMIN HYDROCHLORIDE 500 MG: 500 TABLET ORAL at 12:22

## 2021-02-12 RX ADMIN — HEPARIN SODIUM 5000 UNITS: 5000 INJECTION, SOLUTION INTRAVENOUS; SUBCUTANEOUS at 12:22

## 2021-02-12 RX ADMIN — PANTOPRAZOLE SODIUM 40 MG: 40 TABLET, DELAYED RELEASE ORAL at 08:32

## 2021-02-12 RX ADMIN — CLOPIDOGREL BISULFATE 75 MG: 75 TABLET ORAL at 08:32

## 2021-02-12 RX ADMIN — GADOBUTROL 10 ML: 604.72 INJECTION INTRAVENOUS at 16:58

## 2021-02-12 RX ADMIN — METFORMIN HYDROCHLORIDE 500 MG: 500 TABLET ORAL at 08:32

## 2021-02-12 RX ADMIN — GABAPENTIN 100 MG: 100 CAPSULE ORAL at 00:23

## 2021-02-12 ASSESSMENT — ACTIVITIES OF DAILY LIVING (ADL)
ADLS_ACUITY_SCORE: 18
ADLS_ACUITY_SCORE: 18
ADLS_ACUITY_SCORE: 16
ADLS_ACUITY_SCORE: 18

## 2021-02-12 NOTE — PROGRESS NOTES
CLINICAL NUTRITION SERVICES - BRIEF NOTE    Nutrition screen for weight loss. No recent significant loss noted; BMI not low (32.5). Pt currently on regular diet. Will follow per LOS and/or pending PO abilities.     Wt Readings from Last 12 Encounters:   02/11/21 108.9 kg (240 lb)   12/07/19 105.7 kg (233 lb)   12/16/12 110.2 kg (243 lb)   3% over 2 months with outlier weight. Otherwise no weight loss from 12/16 - 2/11.      Norberto Navarrete RD, LD, [facility]  Neuro ICU  Pager: [phone]

## 2021-02-12 NOTE — H&P
[facility]    Stroke Admission Note    Chief Complaint  L vertebral artery occlusion    HPI  Brannon Boggs is a 81 year old male [facility] and has a known PMH of uncontrolled DM 2 (insulin-dependent), CLL, history of coronary artery disease that is currently stable, and clinical dementia (concerning for FTD) who presents to OSH for several complaints, including dizziness, global weakness, sensation changes, and possible left facial droop.    He presented to the ED with worsening of dizziness and generalized fatigue.  He also has global sensory changes and weakness.  His wife noticed a left-sided facial droop a few days ago, but this has not persisted.  [facility], but does not have records of what was done at that time.  Due to the persistent dizziness and global progression of his weakness and sensation change, CT and CTA were done.  Of note, there is an age-indeterminate complete occlusion of the left vertebral artery for most of its course.  He was transferred to [facility] for further work-up.    He is a moderate historian due to cognitive impairment, likely dementia.  On arrival, he is oriented and otherwise appropriate.  He endorses that he has had several months of progressive changes to weakness, sensation, dizziness.  And throughout the interview he references that this is may be been a year or so in length.  He does admit that he has globally declined and progressed over that year, with significant worsening of his dizziness.  It usually not persistent, and rather episodic but it has gotten more frequent and more intense.  Most of his sensation changes include pain especially of the feet that go detention up the legs, and involve the fingertips as well.  He has known diabetic neuropathic pain.  He does endorse global weakness, although does not localize or lateralize this complaint.    He otherwise denies  discoordination, language change, or visual deficits.      TPA Treatment   Not given due to unclear or unfavorable risk-benefit profile for extended window thrombolysis beyond the conventional 4.5 hour time window.    Endovascular Treatment  Not initiated due to absence of proximal vessel occlusion    Impression  Possible Ischemic Stroke due to large-artery atherosclerosis (embolus/thrombosis)   Known Left vertebral artery occlusion     Plan  Acute Ischemic Stroke (without tPA) Plan  #Complete occlusion of Left vertebral artery  - Admit to Neurology  - Neurochecks Q 4 hours  - Permissive HTN; Hydralazine PRN for SBP > 200  - Avoid hypotonic IV fluids  - Received Plavix load at OSH prior to transfer  - Plavix (clopidogrel) 75 mg PO Daily  - Statin: PTA atorvastatin 40, pending LDL  - MRI Stroke Protocol  - TTE with Bubble Study  - Telemetry, EKG  - Bedside Glucose Monitoring  - A1c, Lipid Panel, Troponin x 3  - PT/OT/SLP  - Stroke Education  - Depression Screen  - Apnea Screen  - Euthermia, Euglycemia    #DM2, indulin-dependent, historically uncontrolled  - High dose sliding scale insulin  - Bedside glucose monitoring  - PTA metformin  - Holding home insulin for now    #HLD  - PTA Atorva 40mg for now, pending LDL    #h/o CAD  - Plavix for now  - Holding ASA    #Cognitive impairment  - Delirium precautions  - Reorientation as able    #h/o CLL  - No current therapy        Prophylaxis            For VTE Prevention:  - heparin SQ  - pneumatic compression device    For Acid Suppression:  - GI prophylaxis is not indicated    Code Status  Full Code    During initial physical assessment, the plan of care was discussed and developed with patient.  Plan of care includes: Above plan.    Patient was admitted via transfer from [facility] ED    The patient will be admitted to the Neuro Critical Care/Stroke team..     The patient was discussed with Stroke Fellow, Dr. Ramirez.  The Stroke Staff is Dr. Lopez.    Rupert Lee  MD Per  Neurology Resident  Pager:  [phone]  ___________________________________________________    Nutrition: Regular  None    Past Medical History   No past medical history on file.  Past Surgical History   No past surgical history on file.  Medications   Home Meds  Prior to Admission medications    Medication Sig Start Date End Date Taking? Authorizing Provider   aspirin (ASA) 81 MG tablet Take 324 mg by mouth once Chew and swallow 10/18/19   Reported, Patient   Aspirin Buf,CaCarb-MgCarb-MgO, 81 MG TABS Take 81 mg by mouth At Bedtime 10/18/19   Reported, Patient   atorvastatin (LIPITOR) 40 MG tablet Take 40 mg by mouth At Bedtime 8/31/20   Reported, Patient   blood glucose (ACCU-CHEK MAGGIE PLUS) test strip 1 strip by In Vitro route daily 4/7/20   Reported, Patient   blood glucose (ACCU-CHEK SOFTCLIX) lancing device daily 1/28/20   Reported, Patient   blood glucose monitoring (SOFTCLIX) lancets 1 each by In Vitro route daily 8/31/20   Reported, Patient   famotidine (PEPCID) 20 MG tablet Take 20 mg by mouth daily as needed (heartburn) After dinner 4/7/20   Reported, Patient   gabapentin (NEURONTIN) 100 MG capsule Take 100 mg by mouth At Bedtime 4/7/20   Reported, Patient   insulin glargine (LANTUS PEN) 100 UNIT/ML pen Inject 34 Units Subcutaneous every morning 1/7/21   Reported, Patient   insulin pen needle (31G X 8 MM) 31G X 8 MM miscellaneous Inject 1 each Subcutaneous daily For lantus 10/23/20   Reported, Patient   lisinopril (ZESTRIL) 5 MG tablet Take 2.5 mg by mouth daily 3/20/20   Reported, Patient   metFORMIN (GLUCOPHAGE) 500 MG tablet Take 500 mg by mouth 3 times daily (with meals)  10/18/19   Reported, Patient   metoprolol tartrate (LOPRESSOR) 25 MG tablet Take 12.5 mg by mouth 2 times daily 10/23/20   Reported, Patient   nitroGLYcerin (NITROSTAT) 0.4 MG sublingual tablet Place 0.4 mg under the tongue every 5 minutes as needed for chest pain Do not crush. Max 3 doses in 15 minutes. 3/20/20   Reported,  Patient   ORDER FOR DME Equipment being ordered: Walker 12/16/12   Charlotte Hair MD   pantoprazole (PROTONIX) 40 MG EC tablet Take 40 mg by mouth daily 8/31/20   Reported, Patient   traMADol (ULTRAM) 50 MG tablet Take 50 mg by mouth 8/17/19   Reported, Patient       Scheduled Meds      Infusion Meds      PRN Meds      Allergies   No Known Allergies  Family History   No family history on file.  Social History   Social History     Tobacco Use     Smoking status: Former Smoker     Smokeless tobacco: Never Used   Substance Use Topics     Alcohol use: Not Currently     Frequency: Never     Drug use: Not Currently       Review of Systems   The 10 point Review of Systems is negative other than noted in the HPI or here.        PHYSICAL EXAMINATION  Temp:  [97.3  F (36.3  C)] 97.3  F (36.3  C)  Pulse:  [60-78] 64  Resp:  [11-16] 11  BP: (125-173)/() 135/72  SpO2:  [94 %-99 %] 96 %    General:  patient lying in bed without any acute distress    HEENT:  normocephalic/atraumatic, no oral lesions, no epistaxis   Cardio:  RRR  Pulmonary:  no respiratory distress  Abdomen:  soft, non-tender  Extremities:  no edema, peripheral pulses palpable  Skin:  intact, warm/dry     Neurologic  Mental Status:  alert, oriented x 3, follows commands, speech clear and fluent, naming and repetition normal  Cranial Nerves:  visual fields intact, PERRL, EOMI with normal smooth pursuit, facial sensation intact and symmetric, facial movements symmetric, hearing not formally tested but intact to conversation, palate elevation symmetric and uvula midline, no dysarthria, shoulder shrug strong bilaterally, tongue protrusion midline  Motor:  normal muscle tone and bulk, no abnormal movements, able to move all limbs spontaneously, strength 5/5 throughout upper and lower extremities, no pronator drift  Reflexes:  toes down-going  Sensory:  light touch sensation intact and symmetric throughout upper and lower extremities, no extinction on double  simultaneous stimulation   Coordination:  normal finger-to-nose and heel-to-shin bilaterally without dysmetria, rapid alternating movements symmetric  Station/Gait:  deferred    Dysphagia Screen  Passed screening, no dysarthria - Regular Diet with thin liquids  02/12/2021     Modified [place] Scale (pre-stroke):   0-No symptoms     Stroke Scales    NIHSS  Interval     Interval Comments     1a. Level of Consciousness 0-->Alert, keenly responsive   1b. LOC Questions 0-->Answers both questions correctly   1c. LOC Commands 0-->Performs both tasks correctly   2.   Best Gaze 0-->Normal   3.   Visual 0-->No visual loss   4.   Facial Palsy 0-->Normal symmetrical movements   5a. Motor Arm, Left 0-->No drift, limb holds 90 (or 45) degrees for full 10 secs   5b. Motor Arm, Right 0-->No drift, limb holds 90 (or 45) degrees for full 10 secs   6a. Motor Leg, Left 0-->No drift, leg holds 30 degree position for full 5 secs   6b. Motor Leg, right 0-->No drift, leg holds 30 degree position for full 5 secs   7.   Limb Ataxia 0-->Absent   8.   Sensory 0-->Normal, no sensory loss   9.   Best Language 0-->No aphasia, normal   10. Dysarthria 0-->Normal   11. Extinction and Inattention  0-->No abnormality   Total 0 (02/12/21 0009)       Imaging  I personally reviewed all imaging; relevant findings per the HPI.    Lab Results Data   CBC  Recent Labs   Lab 02/11/21  1431   WBC 18.8*   RBC 4.65   HGB 14.4   HCT 42.4        Basic Metabolic Panel   Recent Labs   Lab 02/11/21  1431      POTASSIUM 4.6   CHLORIDE 103   CO2 25   BUN 13   CR 0.91   *   EMILIE 8.8     Liver Panel  Recent Labs   Lab 02/11/21  1431   PROTTOTAL 7.2   ALBUMIN 3.7   BILITOTAL 0.6   ALKPHOS 125   AST 11   ALT 21     INR    Recent Labs   Lab Test 12/07/19  1739   INR 1.00      Lipid Profile  No lab results found.  A1C  No lab results found.  Troponin I    Recent Labs   Lab 02/11/21  1431   TROPI <0.015          Stroke Code / Stroke Consult Data Data    Not a  stroke code

## 2021-02-12 NOTE — PROGRESS NOTES
[facility]    Stroke Progress Note    Interval Events  2/12 pt presented to [place] ED w/ dizziness, lightheadedness, BUE and KATHLEEN numbness, and ongoing chest pain. Admitted for w/u.    Impression  Possible Ischemic Stroke due to large-artery atherosclerosis (embolus/thrombosis)   Known Left vertebral artery occlusion     Plan  Acute Ischemic Stroke (without tPA) Plan  #Complete occlusion of Left vertebral artery  On presentation, no tpa nor endovascular treatment indicated. Risk factors include DM2 (uncontrolled, insulin dependant, HgbA1c 7.4), CLL, CAD (stable). LDL 21, TSH 4.28, WBC 18.7. Stroke & dissection workup in progress.  - MRI dissection protocol  -MRI/MRA brain 2/12  -MRA neck 2/12  -CXR 2/12  - Neurochecks Q 4 hours  -Heparin gtt  - Permissive HTN; Hydralazine or labetolol PRN for SBP > 200  - Avoid hypotonic IV fluids  - Received Plavix load at OSH prior to transfer  - Plavix (clopidogrel) 75 mg PO Daily  - discontinue PTA atorvastatin 40, given LDL of 21 (goal LDL <70)  - MRI Stroke Protocol  - TTE with Bubble Study  - Telemetry, EKG  - Bedside Glucose Monitoring  - A1c, Lipid Panel, Troponin x 3  - PT/OT/SLP  - Stroke Education  - Depression Screen  - Apnea Screen  - Euthermia, Euglycemia     #DM2, indulin-dependent, historically uncontrolled  - High dose sliding scale insulin  - Bedside glucose monitoring  - PTA metformin  - Holding home insulin for now  -gabapentin 100mg PRN nightly     #HLD -- resolved   LDL 21, discontinue PTA atorvastatin given cholesterol levels wnl  - discontinue PTA atorvastatin 40, given LDL of 21 (goal LDL <70)     #h/o CAD  - Plavix 75 mg PO daily  - start ASA     #Cognitive impairment  Clinical dementia, 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 Moderate historian.  - Delirium precautions  - Reorientation as able     #h/o CLL  WBC 18.7.  - No current therapy     Prophylaxis            For VTE Prevention:  - heparin SQ  - pneumatic compression  "device     For Acid Suppression:  - GI prophylaxis is not indicated     Code Status  Full Code    The Stroke Fellow is Dr. Ramirez. The Stroke Staff is Dr. Lopez.    Lori Knott  Medical Student  To page a member of the stroke/neurocritical care service, click here:  AMCOM   Choose \"On Call\" tab at top, then search dropdown box for \"Neurology Adult\", select location, press Enter, then look for stroke/neuro ICU/telestroke.    Resident/Fellow Attestation   I, JOE RAMIREZ, was present with the medical/BHARGAV student who participated in the service and in the documentation of the note.  I have verified the history and personally performed the physical exam and medical decision making.  I agree with the assessment and plan of care as documented in the note.        JOE RAMIREZ MD  PGY5  Date of Service (when I saw the patient): 02/12/21  ______________________________________________________    Medications   Home Meds  Prior to Admission medications    Medication Sig Start Date End Date Taking? Authorizing Provider   aspirin (ASA) 81 MG tablet Take 324 mg by mouth once Chew and swallow 10/18/19   Reported, Patient   Aspirin Buf,CaCarb-MgCarb-MgO, 81 MG TABS Take 81 mg by mouth At Bedtime 10/18/19   Reported, Patient   atorvastatin (LIPITOR) 40 MG tablet Take 40 mg by mouth At Bedtime 8/31/20   Reported, Patient   blood glucose (ACCU-CHEK MAGGIE PLUS) test strip 1 strip by In Vitro route daily 4/7/20   Reported, Patient   blood glucose (ACCU-CHEK SOFTCLIX) lancing device daily 1/28/20   Reported, Patient   blood glucose monitoring (SOFTCLIX) lancets 1 each by In Vitro route daily 8/31/20   Reported, Patient   famotidine (PEPCID) 20 MG tablet Take 20 mg by mouth daily as needed (heartburn) After dinner 4/7/20   Reported, Patient   gabapentin (NEURONTIN) 100 MG capsule Take 100 mg by mouth At Bedtime 4/7/20   Reported, Patient   insulin glargine (LANTUS PEN) 100 UNIT/ML pen Inject 34 Units Subcutaneous every morning " 1/7/21   Reported, Patient   insulin pen needle (31G X 8 MM) 31G X 8 MM miscellaneous Inject 1 each Subcutaneous daily For lantus 10/23/20   Reported, Patient   lisinopril (ZESTRIL) 5 MG tablet Take 2.5 mg by mouth daily 3/20/20   Reported, Patient   metFORMIN (GLUCOPHAGE) 500 MG tablet Take 500 mg by mouth 3 times daily (with meals)  10/18/19   Reported, Patient   metoprolol tartrate (LOPRESSOR) 25 MG tablet Take 12.5 mg by mouth 2 times daily 10/23/20   Reported, Patient   nitroGLYcerin (NITROSTAT) 0.4 MG sublingual tablet Place 0.4 mg under the tongue every 5 minutes as needed for chest pain Do not crush. Max 3 doses in 15 minutes. 3/20/20   Reported, Patient   ORDER FOR DME Equipment being ordered: Walker 12/16/12   Charlotte Hair MD   pantoprazole (PROTONIX) 40 MG EC tablet Take 40 mg by mouth daily 8/31/20   Reported, Patient   traMADol (ULTRAM) 50 MG tablet Take 50 mg by mouth 8/17/19   Reported, Patient       Scheduled Meds    atorvastatin  40 mg Oral At Bedtime     clopidogrel  75 mg Oral or NG Tube Daily     gabapentin  100 mg Oral At Bedtime     heparin ANTICOAGULANT  5,000 Units Subcutaneous Q12H     insulin aspart  1-10 Units Subcutaneous TID AC     insulin aspart  1-7 Units Subcutaneous At Bedtime     metFORMIN  500 mg Oral TID w/meals     pantoprazole  40 mg Oral Daily       Infusion Meds    - MEDICATION INSTRUCTIONS -         PRN Meds  acetaminophen, glucose **OR** dextrose **OR** glucagon, hydrALAZINE, - MEDICATION INSTRUCTIONS -, ondansetron **OR** ondansetron, polyethylene glycol       PHYSICAL EXAMINATION  Temp:  [97.3  F (36.3  C)-98.8  F (37.1  C)] 97.6  F (36.4  C)  Pulse:  [60-84] 60  Resp:  [11-16] 16  BP: (109-173)/() 145/73  SpO2:  [94 %-100 %] 97 %     General:  patient lying in bed without any acute distress    HEENT:  normocephalic/atraumatic  Cardio:  RRR  Pulmonary:  no respiratory distress  Abdomen:  soft, non-tender, non-distended, bowel sounds present  Extremities:  no  edema  Skin:  intact, warm/dry     Neurologic  Mental Status:  naming and repetition normal  Cranial Nerves:  PERRL, facial movements symmetric, palate elevation symmetric and uvula midline, no dysarthria, shoulder shrug strong bilaterally, tongue protrusion midline  Motor:  strength 5/5 throughout upper and lower extremities, no pronator drift  Reflexes:  2+ and symmetric throughout, toes down-going  Sensory:  no extinction on double simultaneous stimulation   Coordination:  rapid alternating movements symmetric  Station/Gait:  deferred     Stroke Scales    NIHSS  Interval     Interval Comments     1a. Level of Consciousness 0-->Alert, keenly responsive   1b. LOC Questions 0-->Answers both questions correctly   1c. LOC Commands 0-->Performs both tasks correctly   2.   Best Gaze 0-->Normal   3.   Visual 0-->No visual loss   4.   Facial Palsy 0-->Normal symmetrical movements   5a. Motor Arm, Left 0-->No drift, limb holds 90 (or 45) degrees for full 10 secs   5b. Motor Arm, Right 0-->No drift, limb holds 90 (or 45) degrees for full 10 secs   6a. Motor Leg, Left 0-->No drift, leg holds 30 degree position for full 5 secs   6b. Motor Leg, right 0-->No drift, leg holds 30 degree position for full 5 secs   7.   Limb Ataxia 0-->Absent   8.   Sensory 0-->Normal, no sensory loss   9.   Best Language 0-->No aphasia, normal   10. Dysarthria 0-->Normal   11. Extinction and Inattention  0-->No abnormality   Total 0 (02/12/21 0009)       Imaging  I personally reviewed all imaging; relevant findings per HPI.     Lab Results Data   CBC  Recent Labs   Lab 02/12/21  0541 02/11/21  1431   WBC 18.7* 18.8*   RBC 4.65 4.65   HGB 14.6 14.4   HCT 43.3 42.4    185     Basic Metabolic Panel    Recent Labs   Lab 02/12/21  0541 02/11/21  1431    134   POTASSIUM 3.9 4.6   CHLORIDE 106 103   CO2 24 25   BUN 12 13   CR 0.83 0.91   * 171*   EMILIE 9.3 8.8     Liver Panel  Recent Labs   Lab 02/12/21  0541 02/11/21  1431   PROTTOTAL  6.8 7.2   ALBUMIN 3.6 3.7   BILITOTAL 0.6 0.6   ALKPHOS 104 125   AST 11 11   ALT 21 21     INR    Recent Labs   Lab Test 02/12/21  0541 12/07/19  1739   INR 1.10 1.00      Lipid Profile    Recent Labs   Lab Test 02/12/21  0541   CHOL 85   HDL 42   LDL 21   TRIG 108     A1C    Recent Labs   Lab Test 02/12/21  0541   A1C 7.4*     Troponin I    Recent Labs   Lab 02/11/21  1431   TROPI <0.015

## 2021-02-12 NOTE — PROGRESS NOTES
02/12/21 1100   Quick Adds   Type of Visit Initial PT Evaluation   Living Environment   People in home spouse   Current Living Arrangements house  (town home)   Home Accessibility no concerns   Transportation Anticipated family or friend will provide   Living Environment Comments walk in shower, grab bars, no GEORGE, one level home   Self-Care   Usual Activity Tolerance moderate   Current Activity Tolerance fair   Regular Exercise No   Equipment Currently Used at Home cane, straight;walker, rolling   Activity/Exercise/Self-Care Comment Pt reports he is near baseline mobility   Disability/Function   Hearing Difficulty or Deaf no   Wear Glasses or Blind no   Concentrating, Remembering or Making Decisions Difficulty yes   Difficulty Communicating no   Walking or Climbing Stairs Difficulty yes   Walking or Climbing Stairs ambulation difficulty, requires equipment   Mobility Management SEC or FWW in home   Dressing/Bathing Difficulty no   Toileting issues no   Doing Errands Independently Difficulty (such as shopping) yes   Errands Management family assists   Change in Functional Status Since Onset of Current Illness/Injury yes  (slightly below baseline PLOF)   General Information   Onset of Illness/Injury or Date of Surgery 02/11/21   Referring Physician Rupert Albarado MD   Pertinent History of Current Problem (include personal factors and/or comorbidities that impact the POC) Brannon Boggs is a 81 year old [facility] and has a known PMH of uncontrolled DM 2 (insulin-dependent), CLL, history of coronary artery disease that is currently stable, and clinical dementia (concerning for FTD) who presents to OSH for several complaints, including dizziness, global weakness, sensation changes, and possible left facial droop.   Existing Precautions/Restrictions fall   Heart Disease Risk Factors Diabetes;Lack of physical activity;Overweight;Medical history;Gender;Age   General Observations  Activity: up with assist   Cognition   Orientation Status (Cognition) person   Cognitive Status Comments Pt referencing we were in [place] throughout session   Pain Assessment   Patient Currently in Pain Yes, see Vital Sign flowsheet  (R hip- longstanding)   Range of Motion (ROM)   ROM Quick Adds ROM WFL   ROM Comment Hamstring tightness noted   Strength   Manual Muscle Testing Quick Adds Strength WFL   Strength Comments B hip flexors 4/5, knee ext 5/5   Bed Mobility   Comment (Bed Mobility) PT: Rom with use of bedrails   Transfers   Transfer Safety Comments PT: CGA-SBA STS to FWW   Gait/Stairs (Locomotion)   Comment (Gait/Stairs) PT: CGA-SBA FWW, slow gait speed, decreased step length (tx)   Balance   Balance Comments PT: stands at FWW with SBA, no postural sway or unsteadiness with rising   Sensory Examination   Sensory Perception Comments PT: pt reports DPN in feet and hands, sensation screen normal R foot, impaired L foot (1/6 correct great toe proprioception) likely repated to DPN   Clinical Impression   Criteria for Skilled Therapeutic Intervention yes, treatment indicated   PT Diagnosis (PT) impaired functional mobility   Influenced by the following impairments decreased activity tolerance, LE weakness, peripheral neuropathy   Functional limitations due to impairments transfers, bed mobility, ambulation   Clinical Presentation Stable/Uncomplicated   Clinical Presentation Rationale PMH, clinician impression   Clinical Decision Making (Complexity) low complexity   Therapy Frequency (PT) 4x/week   Predicted Duration of Therapy Intervention (days/wks) 4 days   Planned Therapy Interventions (PT) balance training;bed mobility training;gait training;home exercise program;patient/family education;strengthening;transfer training   Risk & Benefits of therapy have been explained evaluation/treatment results reviewed;care plan/treatment goals reviewed;risks/benefits reviewed;current/potential barriers  reviewed;participants voiced agreement with care plan;participants included;patient   Clinical Impression Comments Pt presents below baseline PLOF, impaired gait, bed mobility and tranfsers and requires SBA-supervision for safety and falls prevention. Pt mobilziing adequate for home with assist from wife with higher level activities.   PT Discharge Planning    PT Discharge Recommendation (DC Rec) home with assist;home with home care physical therapy   PT Rationale for DC Rec Pt presents below baseline PLOF, impaired gait, bed mobility and tranfsers and requires SBA-supervision for safety and falls prevention. Pt mobilziing adequate for home with assist from wife with higher level activities. Would benefit from  PT to address deficits needed to return to community level mobility.   PT Brief overview of current status  CGA-SBA for transfers and amb - FWW   Total Evaluation Time   Total Evaluation Time (Minutes) 25

## 2021-02-12 NOTE — PROGRESS NOTES
02/12/21 1400   Quick Adds   Type of Visit Initial Occupational Therapy Evaluation       Present no   Language english   Living Environment   People in home spouse   [facility]   Home Accessibility no concerns   Transportation Anticipated family or friend will provide   Living Environment Comments [facility]. Pt has a walk in shower in the bathroom with a shower chair and grab bars   Self-Care   Usual Activity Tolerance moderate   Current Activity Tolerance fair   Regular Exercise No   Equipment Currently Used at Home cane, straight;walker, rolling;grab bar, tub/shower;shower chair   Activity/Exercise/Self-Care Comment Pt was previously mod I with ADL completion   Disability/Function   Hearing Difficulty or Deaf no   Wear Glasses or Blind no   Concentrating, Remembering or Making Decisions Difficulty yes   Concentration Management Per wife, pt has dementia   Difficulty Communicating no   Difficulty Eating/Swallowing no   Walking or Climbing Stairs Difficulty yes   Walking or Climbing Stairs ambulation difficulty, requires equipment   Dressing/Bathing Difficulty yes   Dressing/Bathing bathing difficulty, requires equipment;dressing difficulty, assistance 1 person   Toileting issues yes   Toileting Assistance toileting difficulty, requires equipment   Doing Errands Independently Difficulty (such as shopping) yes   Errands Management pt spouse assists with IADL completion   Fall history within last six months no   Change in Functional Status Since Onset of Current Illness/Injury yes   General Information   Onset of Illness/Injury or Date of Surgery 02/11/21   Referring Physician Rupert Albarado MD   Patient/Family Therapy Goal Statement (OT) To return home   Additional Occupational Profile Info/Pertinent History of Current Problem Brannon Boggs is a 81 year old male [facility] and has a known PMH of  uncontrolled DM 2 (insulin-dependent), CLL, history of coronary artery disease that is currently stable, and clinical dementia (concerning for FTD) who presents to OSH for several complaints, including dizziness, global weakness, sensation changes, and possible left facial droop.   Existing Precautions/Restrictions fall   Limitations/Impairments safety/cognitive   Left Upper Extremity (Weight-bearing Status) full weight-bearing (FWB)   Right Upper Extremity (Weight-bearing Status) full weight-bearing (FWB)   Left Lower Extremity (Weight-bearing Status) full weight-bearing (FWB)   Right Lower Extremity (Weight-bearing Status) full weight-bearing (FWB)   Heart Disease Risk Factors Age   General Observations and Info Activity: up with assist   Cognitive Status Examination   Orientation Status person   Affect/Mental Status (Cognitive) confused   Follows Commands follows one-step commands;50-74% accuracy;repetition of directions required;verbal cues/prompting required   Safety Deficit severe deficit   Memory Deficit severe deficit   Cognitive Status Comments Pt is alert and oriented to person and month only. Pt is able to follow ~70% of simple one step commands. Administered the MOCA 8.1 and pt scored 8/30.    Visual Perception   Visual Impairment/Limitations WFL   Sensory   Sensory Comments Pt presents with BUE and BLE numbness   Pain Assessment   Patient Currently in Pain No   Integumentary/Edema   Integumentary/Edema no deficits were identifed   Range of Motion Comprehensive   General Range of Motion no range of motion deficits identified   Strength Comprehensive (MMT)   General Manual Muscle Testing (MMT) Assessment no strength deficits identified   Comment, General Manual Muscle Testing (MMT) Assessment Pt presents with generalized weakness   Transfers   Transfers sit-stand transfer   Sit-Stand Transfer   Sit-Stand [place] (Transfers) contact guard   Assistive Device (Sit-Stand Transfers) walker, standard    Instrumental Activities of Daily Living (IADL)   IADL Comments Pt wife assists with IADL completion   Clinical Impression   Criteria for Skilled Therapeutic Interventions Met (OT) yes;meets criteria;skilled treatment is necessary   OT Diagnosis decreased activity tolerance and independence with ADL completion   OT Problem List-Impairments impacting ADL activity tolerance impaired;cognition;strength   Assessment of Occupational Performance 5 or more Performance Deficits   Identified Performance Deficits g/h, toileting, bathing, dressing, functional mobility and cognition   Planned Therapy Interventions (OT) ADL retraining;cognition;strengthening;ROM;progressive activity/exercise;home program guidelines   Clinical Decision Making Complexity (OT) low complexity   Therapy Frequency (OT) 5x/week   Predicted Duration of Therapy 1 week   Anticipated Equipment Needs Upon Discharge (OT) other (see comments)  (TBD)   Risk & Benefits of therapy have been explained evaluation/treatment results reviewed;care plan/treatment goals reviewed;risks/benefits reviewed;current/potential barriers reviewed;participants voiced agreement with care plan;participants included;patient   OT Discharge Planning    OT Discharge Recommendation (DC Rec) Home with assist;home with home care occupational therapy   OT Rationale for DC Rec Pt with severe cognitive impairments. Would recommend home with 24 hour assist and HH OT/PT to further address cognitive deficits and independence with ADL/IADL completion. Per conversation with spouse, pt is likely at baseline for ADL completion and functional mobility. Pt spouse is comfortable taking pt home.    OT Brief overview of current status  CGA + FWW for ambulation   Total Evaluation Time (Minutes)   Total Evaluation Time (Minutes) 8

## 2021-02-12 NOTE — CONSULTS
Stroke Education Note    The following information has been reviewed with the patient:    1. Warning signs of stroke    2. Calling 911 if having warning signs of stroke    3. All modifiable risk factors: hypertension, CAD, atrial fib, diabetes, hypercholesterolemia, smoking, substance abuse, diet, physical inactivity, obesity, sleep apnea.    4. Patient's risk factors for stroke which include: DM2, HLD, and CAD    5. Follow-up plan for after discharge    6. Discharge medications which include: Plavix, metformin, and lipitor    In addition, the [facility] Stroke Class Handout has been given to the patient.    Learner's response to risk factors / lifestyle modification education: Taking steps     JAN Stark RN

## 2021-02-12 NOTE — PROGRESS NOTES
Arrived from:  [facility] via EMS  Belongings/meds:  Clothing, shoes, wallet  2 RN Skin Assessment Completed by:  Naty ALEXANDER RN  Non-intact findings documented (yes/no/NA): Skin intact

## 2021-02-13 ENCOUNTER — PATIENT OUTREACH (OUTPATIENT)
Dept: CARE COORDINATION | Facility: CLINIC | Age: 81
End: 2021-02-13

## 2021-02-13 VITALS
HEIGHT: 72 IN | DIASTOLIC BLOOD PRESSURE: 65 MMHG | OXYGEN SATURATION: 97 % | RESPIRATION RATE: 16 BRPM | TEMPERATURE: 97.8 F | HEART RATE: 72 BPM | BODY MASS INDEX: 32.55 KG/M2 | SYSTOLIC BLOOD PRESSURE: 131 MMHG

## 2021-02-13 LAB
ANION GAP SERPL CALCULATED.3IONS-SCNC: 7 MMOL/L (ref 3–14)
BUN SERPL-MCNC: 16 MG/DL (ref 7–30)
CALCIUM SERPL-MCNC: 9.2 MG/DL (ref 8.5–10.1)
CHLORIDE SERPL-SCNC: 106 MMOL/L (ref 94–109)
CO2 SERPL-SCNC: 23 MMOL/L (ref 20–32)
CREAT SERPL-MCNC: 0.88 MG/DL (ref 0.66–1.25)
ERYTHROCYTE [DISTWIDTH] IN BLOOD BY AUTOMATED COUNT: 13.2 % (ref 10–15)
GFR SERPL CREATININE-BSD FRML MDRD: 81 ML/MIN/{1.73_M2}
GLUCOSE BLDC GLUCOMTR-MCNC: 152 MG/DL (ref 70–99)
GLUCOSE BLDC GLUCOMTR-MCNC: 158 MG/DL (ref 70–99)
GLUCOSE BLDC GLUCOMTR-MCNC: 187 MG/DL (ref 70–99)
GLUCOSE SERPL-MCNC: 166 MG/DL (ref 70–99)
HCT VFR BLD AUTO: 42.8 % (ref 40–53)
HGB BLD-MCNC: 14.1 G/DL (ref 13.3–17.7)
MCH RBC QN AUTO: 30.5 PG (ref 26.5–33)
MCHC RBC AUTO-ENTMCNC: 32.9 G/DL (ref 31.5–36.5)
MCV RBC AUTO: 92 FL (ref 78–100)
PLATELET # BLD AUTO: 183 10E9/L (ref 150–450)
POTASSIUM SERPL-SCNC: 4.3 MMOL/L (ref 3.4–5.3)
RBC # BLD AUTO: 4.63 10E12/L (ref 4.4–5.9)
SODIUM SERPL-SCNC: 136 MMOL/L (ref 133–144)
WBC # BLD AUTO: 19.2 10E9/L (ref 4–11)

## 2021-02-13 PROCEDURE — 250N000013 HC RX MED GY IP 250 OP 250 PS 637: Performed by: STUDENT IN AN ORGANIZED HEALTH CARE EDUCATION/TRAINING PROGRAM

## 2021-02-13 PROCEDURE — 999N001017 HC STATISTIC GLUCOSE BY METER IP

## 2021-02-13 PROCEDURE — 99239 HOSP IP/OBS DSCHRG MGMT >30: CPT | Mod: GC | Performed by: PSYCHIATRY & NEUROLOGY

## 2021-02-13 PROCEDURE — 80048 BASIC METABOLIC PNL TOTAL CA: CPT | Performed by: PSYCHIATRY & NEUROLOGY

## 2021-02-13 PROCEDURE — 36415 COLL VENOUS BLD VENIPUNCTURE: CPT | Performed by: PSYCHIATRY & NEUROLOGY

## 2021-02-13 PROCEDURE — 250N000011 HC RX IP 250 OP 636: Performed by: STUDENT IN AN ORGANIZED HEALTH CARE EDUCATION/TRAINING PROGRAM

## 2021-02-13 PROCEDURE — 85027 COMPLETE CBC AUTOMATED: CPT | Performed by: PSYCHIATRY & NEUROLOGY

## 2021-02-13 RX ORDER — ASPIRIN 325 MG
324 TABLET ORAL ONCE
Qty: 1 TABLET | Refills: 0 | Status: SHIPPED | OUTPATIENT
Start: 2021-02-13 | End: 2021-02-13

## 2021-02-13 RX ADMIN — CLOPIDOGREL BISULFATE 75 MG: 75 TABLET ORAL at 08:02

## 2021-02-13 RX ADMIN — PANTOPRAZOLE SODIUM 40 MG: 40 TABLET, DELAYED RELEASE ORAL at 08:02

## 2021-02-13 RX ADMIN — METFORMIN HYDROCHLORIDE 500 MG: 500 TABLET ORAL at 08:02

## 2021-02-13 RX ADMIN — HEPARIN SODIUM 5000 UNITS: 5000 INJECTION, SOLUTION INTRAVENOUS; SUBCUTANEOUS at 12:10

## 2021-02-13 RX ADMIN — ACETAMINOPHEN 650 MG: 325 TABLET, FILM COATED ORAL at 08:59

## 2021-02-13 RX ADMIN — HEPARIN SODIUM 5000 UNITS: 5000 INJECTION, SOLUTION INTRAVENOUS; SUBCUTANEOUS at 00:06

## 2021-02-13 RX ADMIN — METFORMIN HYDROCHLORIDE 500 MG: 500 TABLET ORAL at 12:10

## 2021-02-13 ASSESSMENT — VISUAL ACUITY
OU: NORMAL ACUITY
OU: NORMAL ACUITY

## 2021-02-13 ASSESSMENT — ACTIVITIES OF DAILY LIVING (ADL)
ADLS_ACUITY_SCORE: 19

## 2021-02-13 NOTE — PROGRESS NOTES
[facility]    Stroke Progress Note    Interval Events  No acute events overnight.  Vitals stable.  Completed MRI brain as well as MRA of the head and neck which did not show any acute ischemic injury.    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    - Stroke risk factors include HbA1c 7.4%, LDL 21, TSH 4.2.  - Blood pressure goal: Normotension  - Secondary prophylaxis: Patient documented to be on ASA 81 mg daily PTA.  He was thus started on Plavix 75 mg daily.  However, patient states that he has been intermittently missing his doses of aspirin.  We will switch back to ASA at a weight appropriate dose of 325 mg daily.  - No need for statin therapy, given LDL of 21.  - PT and OT recommendations for discharge to home with assist noted    Code Status  Full Code    The Stroke Staff is Dr. Lopez.    Óscar Ramirez  Stroke Fellow  PGY-5   02/13/2021   ______________________________________________________    Medications   Home Meds  Prior to Admission medications    Medication Sig Start Date End Date Taking? Authorizing Provider   aspirin (ASA) 81 MG tablet Take 324 mg by mouth once Chew and swallow 10/18/19   Reported, Patient   Aspirin Buf,CaCarb-MgCarb-MgO, 81 MG TABS Take 81 mg by mouth At Bedtime 10/18/19   Reported, Patient   atorvastatin (LIPITOR) 40 MG tablet Take 40 mg by mouth At Bedtime 8/31/20   Reported, Patient   blood glucose (ACCU-CHEK MAGGIE PLUS) test strip 1 strip by In Vitro route daily 4/7/20   Reported, Patient   blood glucose (ACCU-CHEK SOFTCLIX) lancing device daily 1/28/20   Reported, Patient   blood glucose monitoring (SOFTCLIX) lancets 1 each by In Vitro route daily 8/31/20   Reported, Patient   famotidine (PEPCID) 20 MG tablet Take 20 mg by mouth daily as needed (heartburn) After dinner 4/7/20   Reported, Patient   gabapentin (NEURONTIN) 100 MG  capsule Take 100 mg by mouth At Bedtime 4/7/20   Reported, Patient   insulin glargine (LANTUS PEN) 100 UNIT/ML pen Inject 34 Units Subcutaneous every morning 1/7/21   Reported, Patient   insulin pen needle (31G X 8 MM) 31G X 8 MM miscellaneous Inject 1 each Subcutaneous daily For lantus 10/23/20   Reported, Patient   lisinopril (ZESTRIL) 5 MG tablet Take 2.5 mg by mouth daily 3/20/20   Reported, Patient   metFORMIN (GLUCOPHAGE) 500 MG tablet Take 500 mg by mouth 3 times daily (with meals)  10/18/19   Reported, Patient   metoprolol tartrate (LOPRESSOR) 25 MG tablet Take 12.5 mg by mouth 2 times daily 10/23/20   Reported, Patient   nitroGLYcerin (NITROSTAT) 0.4 MG sublingual tablet Place 0.4 mg under the tongue every 5 minutes as needed for chest pain Do not crush. Max 3 doses in 15 minutes. 3/20/20   Reported, Patient   ORDER FOR DME Equipment being ordered: Walker 12/16/12   Charlotte Hair MD   pantoprazole (PROTONIX) 40 MG EC tablet Take 40 mg by mouth daily 8/31/20   Reported, Patient   traMADol (ULTRAM) 50 MG tablet Take 50 mg by mouth 8/17/19   Reported, Patient       Scheduled Meds    clopidogrel  75 mg Oral or NG Tube Daily     gabapentin  100 mg Oral At Bedtime     heparin ANTICOAGULANT  5,000 Units Subcutaneous Q12H     insulin aspart  1-10 Units Subcutaneous TID AC     insulin aspart  1-7 Units Subcutaneous At Bedtime     metFORMIN  500 mg Oral TID w/meals     pantoprazole  40 mg Oral Daily     perflutren diluted 1mL to 2mL with saline  6 mL Intravenous Once     sodium chloride (PF)  10 mL Intravenous Once     sodium chloride (PF)  10 mL Intracatheter Once     sodium chloride bacteriostatic  12 mL Intravenous Once       Infusion Meds    - MEDICATION INSTRUCTIONS -         PRN Meds  acetaminophen, glucose **OR** dextrose **OR** glucagon, hydrALAZINE, - MEDICATION INSTRUCTIONS -, ondansetron **OR** ondansetron, polyethylene glycol       PHYSICAL EXAMINATION  Temp:  [97.5  F (36.4  C)-98.5  F (36.9  C)]  98.5  F (36.9  C)  Pulse:  [65-97] 71  Resp:  [16] 16  BP: ()/(71-97) 98/83  SpO2:  [94 %-98 %] 96 %     General:  patient lying in bed without any acute distress    HEENT:  normocephalic/atraumatic  Cardio:  RRR  Pulmonary:  no respiratory distress  Abdomen:  soft, non-tender, non-distended, bowel sounds present  Extremities:  no edema  Skin:  intact, warm/dry     Neurologic  Mental Status:  naming and repetition normal  Cranial Nerves:  PERRL, facial movements symmetric, palate elevation symmetric and uvula midline, no dysarthria, shoulder shrug strong bilaterally, tongue protrusion midline  Motor:  strength 5/5 throughout upper and lower extremities, no pronator drift  Reflexes:  2+ and symmetric throughout, toes down-going  Sensory:  no extinction on double simultaneous stimulation   Coordination:  rapid alternating movements symmetric  Station/Gait:  deferred     Stroke Scales    NIHSS  Interval     Interval Comments     1a. Level of Consciousness 0-->Alert, keenly responsive   1b. LOC Questions 0-->Answers both questions correctly   1c. LOC Commands 0-->Performs both tasks correctly   2.   Best Gaze 0-->Normal   3.   Visual 0-->No visual loss   4.   Facial Palsy 0-->Normal symmetrical movements   5a. Motor Arm, Left 0-->No drift, limb holds 90 (or 45) degrees for full 10 secs   5b. Motor Arm, Right 0-->No drift, limb holds 90 (or 45) degrees for full 10 secs   6a. Motor Leg, Left 0-->No drift, leg holds 30 degree position for full 5 secs   6b. Motor Leg, right 0-->No drift, leg holds 30 degree position for full 5 secs   7.   Limb Ataxia 0-->Absent   8.   Sensory 0-->Normal, no sensory loss   9.   Best Language 0-->No aphasia, normal   10. Dysarthria 0-->Normal   11. Extinction and Inattention  0-->No abnormality   Total 0 (02/12/21 0009)       Imaging  I personally reviewed all imaging; relevant findings per HPI.     Lab Results Data   CBC  Recent Labs   Lab 02/13/21  0631 02/12/21  0541 02/11/21  1431    WBC 19.2* 18.7* 18.8*   RBC 4.63 4.65 4.65   HGB 14.1 14.6 14.4   HCT 42.8 43.3 42.4    173 185     Basic Metabolic Panel    Recent Labs   Lab 02/13/21  0631 02/12/21  0541 02/11/21  1431    136 134   POTASSIUM 4.3 3.9 4.6   CHLORIDE 106 106 103   CO2 23 24 25   BUN 16 12 13   CR 0.88 0.83 0.91   * 118* 171*   EMILIE 9.2 9.3 8.8     Liver Panel  Recent Labs   Lab 02/12/21  0541 02/11/21  1431   PROTTOTAL 6.8 7.2   ALBUMIN 3.6 3.7   BILITOTAL 0.6 0.6   ALKPHOS 104 125   AST 11 11   ALT 21 21     INR    Recent Labs   Lab Test 02/12/21  0541 12/07/19  1739   INR 1.10 1.00      Lipid Profile    Recent Labs   Lab Test 02/12/21  0541   CHOL 85   HDL 42   LDL 21   TRIG 108     A1C    Recent Labs   Lab Test 02/12/21  0541   A1C 7.4*     Troponin I    Recent Labs   Lab 02/11/21  1431   TROPI <0.015

## 2021-02-13 NOTE — CONSULTS
Care Management Initial Consult      General Information  Assessment completed with:  PatientBrannon.  Spouse, Rossana  Type of CM/SW Visit: Offer D/C Planning    Primary Care Provider verified and updated as needed: [facility]   Readmission within the last 30 days:   No     Reason for Consult: discharge planning    Communication Assessment  Patient's communication style: spoken language (English or Bilingual)    Hearing Difficulty or Deaf: no   Wear Glasses or Blind: no    Cognitive  Cognitive/Neuro/Behavioral: .WDL except pt has dementia. Level of Consciousness: alert. Speech: clear, spontaneous.    Living Environment:   People in home: spouse     Current living Arrangements: house([facility])      Able to return to prior arrangements: yes     Family/Social Support:  Care provided by: spouse/significant other  Provides care for:    Marital Status:              Description of Support System  Wife cares for pt and administers his meds.      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     Lifestyle & Psychosocial Needs:     Socioeconomic History     Marital status:      Spouse name: Not on file     Number of children: Not on file     Years of education: Not on file     Highest education level: Not on file     Tobacco Use     Smoking status: Former Smoker     Smokeless tobacco: Never Used   Substance and Sexual Activity     Alcohol use: Not Currently     Frequency: Never     Drug use: Not Currently       Functional Status:  Prior to admission patient needed assistance:  Wife administered meds.      Additional Information:  Diagnosis:  Possible ischemic stroke due to large-artery atherosclerosis. Known left vertebral  "artery occlusion.   Past history of dementia.    Chart reviewed. Home PT recommended. Noted pt has [employer] Medicare Advantage insurance on the facesheet. The [facility]. Home address is in [place], [place].  I spoke with PT about home PT recommendations. Explained due to pt having a VA primary doctor it may be difficult to get home care initiated soon. I can't arrange home care outside the [facility] using the [facility] insurance since the VA primary will want to use their system. PT reported if necessary pt could do outpt PT; she reported pt does not get out much, due to his dementia it is easier for him to be at home.     This afternoon spoke with pt's nurse, Ladi; she reported MRI is scheduled for 3pm; pt said he was going home today, but this is not confirmed; pt has dementia; wife arriving later.   Late this afternoon I introduced myself to pt and his wife, Rossana. Pt alert and pleasant, sitting up in chair. Rossana reported they just moved 2 weeks ago from [place] to [place]. Confirmed [facility]. In [place] he was followed by a doctor outside of VA system, but approved by the [facility]. Pt said he is \"100% connected\" with the VA. Pt said he was in [place] and was exposed to Agent Orange.     Informed wife that home PT was recommended. Explained with the VA system the orders for home care need to come from his VA doctor and they will arrange the home care. Since it is after hours on Friday and Monday, 2-15 is President's Day the VA will be closed. I won't be able to contact anyone at the VA until Tuesday, 2-16. Explained I spoke with PT and they thought it was OK to delay starting PT. Informed wife I will contact the VA on Tuesday, 2-16 and communicate pt's information to his primary and request home PT orders. She was in agreement with this.   Wife, Rossana said the [employer] insurance just started on 2-. I explained since his primary doctor is " with the [facility] it is better to go thru the VA (especially since he receives all his medical care there).   Rossana thought pt was close to baseline. She sets up and manages his medications. She was OK with him returning home.   In pt's wallet Rossana found cards for medical devices pt has. I made a copy of these cards and placed in front of chart; informed Stephanie, Charge Nurse and pt's nurse, Monica.   Verified pt's home address. Wife said the home phone number can be removed from the facesheet, it is from their old home. I called Admissions and had home phone number removed.   Rossana had a letter from the VA with a phone number, [phone]. I will try calling this number and leave a message for his primary.   Called the [facility] at [phone] and reached a Triage Nurse in [place]. She was not able to leave a message for pt's primary or care team.   Home PT orders entered on the AVS.    Plan  --Anticipate discharge home with wife when medically stable.   --On 2-16 RNCC will contact Dr Mitchell's clinic at the [facility] and request he order home PT & arrange this for pt. (this can be done after pt discharges)      Dawna Jarquin RN Care Coordinator  [address], [facility]  Phone:  [phone]

## 2021-02-13 NOTE — DISCHARGE SUMMARY
[facility]    Neurology Stroke Discharge Summary    Date of Admission: 2/11/2021  Date of Discharge: 02/13/2021    Disposition: Discharged to home  Primary Care Physician: Jorge Luis Mitchell      Admission Diagnosis:   Left vertebral artery occlusion    Discharge Diagnosis:   Suspected chronic left vertebral artery occlusion   Chronic dizziness/lightheadedness    Problem Leading to Hospitalization (from HPI): Please see H&P dated 2/11/2021 for further details about presentation.    Brief Hospital Course:   Mr. Boggs is an 81 year old male with past medical history of diabetes mellitus, CLL, dementia, and CAD who presented with dizziness, concern for left facial asymmetry, sensory changes, and global weakness and was admitted for stroke evaluation.  Upon arrival to [facility] his symptoms had reportedly resolved.  Upon further discussion he reported that he has had lightheadedness for years and has had gait difficulties for years as well and uses a walker at baseline.  Mri brain was negative for an acute infarction and did not show evidence of an acute dissection.  Patient reported he only takes aspirin 81 mg sometimes and not consistently so the plavix that had been started was switched to aspirin 325 mg daily.  Statin was not felt to be necessary due to LDL of 21. PT and OT recommended home with supervision for patient and home health PT/OT.  Speech did not evaluate patient as he was at his baseline swallow and speech abilities.    IV tPA was not given.      Work-up as stated below under Pertinent Investigations.    Rehab evaluation: OT and PT.     Smoking Cessation: patient is not a smoker    BP Long-term Goal: 140/90 or less    Antithrombotic/Anticoagulant Agent: aspirin 325 mg    Statins: Statin contraindicated because LDL 21       Hgb A1C Goal: < 7.0    Complications: None.     Other problems addressed during this hospitalization:  None    PERTINENT  INVESTIGATIONS    Labs  Lipid Panel:   Recent Labs   Lab Test 21  0541   CHOL 85   HDL 42   LDL 21   TRIG 108     A1C:   Lab Results   Component Value Date    A1C 7.4 2021     INR:   Recent Labs   Lab 21  0541   INR 1.10      Coag Panel / Hypercoag Workup: Not indicated  Pending test results: N/A    Echo:   21 TTE:  Interpretation Summary  Technically difficult study.  Global and regional left ventricular function is normal with an EF of 55-60%.  The right ventricle is normal size.  Global right ventricular function is normal.  Bubble study appear negative though technically difficult.  Mild mitral annular calcification is present.  No significant valvular abnormalities were noted by Doppler. Limited  visualization of valves.  No pericardial effusion is present.  Previous study not available for comparison.    Imagin21 CTA H/N:  IMPRESSION:  1. Plaque without stenosis at the origins of the internal carotid  arteries on both sides.  2. Age-indeterminate complete occlusion of the left vertebral artery  from its origin to the basilar artery.  3. Focus of calcification in the basilar tip that could represent  native atherosclerotic plaque; however, embolized calcified plaque  cannot be excluded.  4. Moderate atherosclerotic narrowing at the junction between the  cavernous and supraclinoid segments of the intracranial distal right  internal carotid artery.  5. Otherwise, normal neck and head CTA.    21 MRI Brain Stroke Protocol:  Impression:  1. No evidence of acute infarct.  2. No abnormal enhancing lesions intracranially.  3. Head MRA demonstrates unchanged moderate right cavernous internal  carotid artery stenosis.  4. Suboptimal neck MRA due to a delayed bolus. Known left vertebral  artery occlusion is not well seen.     Endovascular procedure: None     Cardiac Monitoring: Patient had > 24 hrs of cardiac monitor while in hospital.    Findings: No atrial fibrillation was  found.    Sleep Apnea Screen:   Did not screen patient since this was not an ischemic stroke    Sleep Apnea Screen Findings: N/A    PHQ-9 Depression Screen Score: N/A    Education discussed with: patient on blood pressure management, cholesterol management, medical management, smoking cessation plan, diet modification, exercise recommendation and 911 call if warning signs of stroke.    During daily rounds, the plan of care was discussed and developed with patient and spouse.  Plan of care includes: home with supervision and home health PT/OT.    PHYSICAL EXAMINATION  Vital Signs:  B/P: 131/65, T: 97.8, P: 72, R: 16    General: in no apparent distress  HEENT:  normocephalic/atraumatic  Cardio:  RRR  Pulmonary:  no respiratory distress  Abdomen:  soft, non-tender, non-distended  Extremities:  no edema  Skin:  intact     Neurologic  Mental Status:  fully alert  Cranial Nerves:  visual fields intact, PERRL, EOMI with normal smooth pursuit, facial sensation intact and symmetric, facial movements symmetric, no dysarthria, tongue protrusion midline  Motor:  no abnormal movements, strength 5/5 throughout upper and lower extremities  Reflexes:  Not assessed  Sensory:  intact/symmetric to light touch and pin prick throughout upper and lower extremities  Coordination:  FNF and HS intact without dysmetria  Station/Gait:  normal width, stride length, turn, and arm swing     National Institutes of Health Stroke Scale (on day of discharge)  NIHSS Total Score: 0    Modified [place] Scale (on day of discharge): 0-No symptoms    Medications    Current Discharge Medication List      CONTINUE these medications which have CHANGED    Details   aspirin (ASA) 325 MG tablet Take 1 tablet (324 mg) by mouth once for 1 dose Chew and swallow  Qty: 1 tablet, Refills: 0    Associated Diagnoses: Occlusion of vertebral artery, unspecified laterality         CONTINUE these medications which have NOT CHANGED    Details   gabapentin (NEURONTIN) 100 MG  capsule Take 100 mg by mouth At Bedtime      insulin glargine (LANTUS PEN) 100 UNIT/ML pen Inject 34 Units Subcutaneous every morning    Comments: If Lantus is not covered by insurance, may substitute Basaglar at same dose and frequency.        lisinopril (ZESTRIL) 5 MG tablet Take 2.5 mg by mouth daily      metFORMIN (GLUCOPHAGE) 500 MG tablet Take 500 mg by mouth 3 times daily (with meals)       metoprolol tartrate (LOPRESSOR) 25 MG tablet Take 12.5 mg by mouth 2 times daily      pantoprazole (PROTONIX) 40 MG EC tablet Take 40 mg by mouth daily      blood glucose (ACCU-CHEK MAGGIE PLUS) test strip 1 strip by In Vitro route daily      blood glucose (ACCU-CHEK SOFTCLIX) lancing device daily      blood glucose monitoring (SOFTCLIX) lancets 1 each by In Vitro route daily      famotidine (PEPCID) 20 MG tablet Take 20 mg by mouth daily as needed (heartburn) After dinner      insulin pen needle (31G X 8 MM) 31G X 8 MM miscellaneous Inject 1 each Subcutaneous daily For lantus      nitroGLYcerin (NITROSTAT) 0.4 MG sublingual tablet Place 0.4 mg under the tongue every 5 minutes as needed for chest pain Do not crush. Max 3 doses in 15 minutes.      ORDER FOR DME Equipment being ordered: Walker  Qty: 1 each, Refills: 0    Associated Diagnoses: Bimalleolar fracture; Ankle fracture, right      traMADol (ULTRAM) 50 MG tablet Take 50 mg by mouth         STOP taking these medications       Aspirin Buf,CaCarb-MgCarb-MgO, 81 MG TABS Comments:   Reason for Stopping:         atorvastatin (LIPITOR) 40 MG tablet Comments:   Reason for Stopping:               Additional recommendations and follow up:       Home Care PT Referral for Hospital Discharge      Reason for your hospital stay    You were admitted to have your dizziness evaluated and treated. You do not have a stroke on MRI. See your primary care doctor for further management of your symptoms. Continue to to take a daily aspirin     Adult [facility] Follow-up and recommended labs  and tests    Follow up with primary care provider, Jorge Luis Mitchell, within 7 days for hospital follow- up.  No follow up labs or test are needed.      Appointments on [place] and/or [facility] (with [facility] or [facility] provider or service). Call [phone] if you haven't heard regarding these appointments within 7 days of discharge.     Activity    Your activity upon discharge: activity as tolerated     Diet    Follow this diet upon discharge: Orders Placed This Encounter      Regular Diet Adult       Patient was seen and discussed with the Attending, Dr. Lopez  [facility] phone: [phone]

## 2021-02-15 NOTE — PROGRESS NOTES
[facility]: Post-Discharge Note  SITUATION                                                      Admission:    Admission Date: 02/11/21   Reason for Admission: Vertebral artery occlusion  Discharge:   Discharge Date: 02/13/21  Discharge Diagnosis: Vertebral artery occlusion    BACKGROUND                                                      Mr. Boggs is an 81 year old male with past medical history of diabetes mellitus, CLL, dementia, and CAD who presented with dizziness, concern for left facial asymmetry, sensory changes, and global weakness and was admitted for stroke evaluation.  Upon arrival to [facility] his symptoms had reportedly resolved.  Upon further discussion he reported that he has had lightheadedness for years and has had gait difficulties for years as well and uses a walker at baseline.  Mri brain was negative for an acute infarction and did not show evidence of an acute dissection.  Patient reported he only takes aspirin 81 mg sometimes and not consistently so the plavix that had been started was switched to aspirin 325 mg daily.  Statin was not felt to be necessary due to LDL of 21. PT and OT recommended home with supervision for patient and home health PT/OT.  Speech did not evaluate patient as he was at his baseline swallow and speech abilities    ASSESSMENT      Discharge Assessment  Patient reports symptoms are: Improved  Does the patient have all of their medications?: Yes  Does patient know what their new medications are for?: Yes  Does patient have a follow-up appointment scheduled?: No  Does patient have any other questions or concerns?: No    Post-op  Did the patient have surgery or a procedure: No  Fever: No  Chills: No  Eating & Drinking: eating and drinking without complaints/concerns  Bowel Function: normal  Urinary Status: voiding without complaint/concerns        PLAN                                                      Outpatient Plan:     Follow up with primary care provider,  Jorge Luis Mitchell, within 7 days for hospital follow- up.  No follow up labs or test are needed         No future appointments.        Shabnam Ac

## 2021-02-19 ENCOUNTER — TELEPHONE (OUTPATIENT)
Dept: ORTHOPEDICS | Facility: CLINIC | Age: 81
End: 2021-02-19

## 2021-02-19 NOTE — TELEPHONE ENCOUNTER
NICOLE for pt to call and sched. Appt. For ortho. [facility] Thank You Hillary

## 2021-03-15 ENCOUNTER — HOSPITAL ENCOUNTER (EMERGENCY)
Facility: CLINIC | Age: 81
Discharge: HOME | End: 2021-03-15
Attending: FAMILY MEDICINE | Admitting: FAMILY MEDICINE
Payer: COMMERCIAL

## 2021-03-15 VITALS
RESPIRATION RATE: 22 BRPM | WEIGHT: 228.6 LBS | HEART RATE: 65 BPM | TEMPERATURE: 98.2 F | DIASTOLIC BLOOD PRESSURE: 67 MMHG | BODY MASS INDEX: 31 KG/M2 | SYSTOLIC BLOOD PRESSURE: 143 MMHG | OXYGEN SATURATION: 97 %

## 2021-03-15 DIAGNOSIS — R07.89 ATYPICAL CHEST PAIN: ICD-10-CM

## 2021-03-15 LAB
ANION GAP SERPL CALCULATED.3IONS-SCNC: 7 MMOL/L (ref 3–14)
BASOPHILS # BLD AUTO: 0.1 10E9/L (ref 0–0.2)
BASOPHILS NFR BLD AUTO: 0.6 %
BUN SERPL-MCNC: 14 MG/DL (ref 7–30)
CALCIUM SERPL-MCNC: 9.2 MG/DL (ref 8.5–10.1)
CHLORIDE SERPL-SCNC: 103 MMOL/L (ref 94–109)
CO2 SERPL-SCNC: 25 MMOL/L (ref 20–32)
CREAT SERPL-MCNC: 0.9 MG/DL (ref 0.66–1.25)
DIFFERENTIAL METHOD BLD: ABNORMAL
EOSINOPHIL # BLD AUTO: 0.2 10E9/L (ref 0–0.7)
EOSINOPHIL NFR BLD AUTO: 1.4 %
ERYTHROCYTE [DISTWIDTH] IN BLOOD BY AUTOMATED COUNT: 13.5 % (ref 10–15)
GFR SERPL CREATININE-BSD FRML MDRD: 80 ML/MIN/{1.73_M2}
GLUCOSE SERPL-MCNC: 138 MG/DL (ref 70–99)
HCT VFR BLD AUTO: 44.5 % (ref 40–53)
HGB BLD-MCNC: 14.7 G/DL (ref 13.3–17.7)
IMM GRANULOCYTES # BLD: 0 10E9/L (ref 0–0.4)
IMM GRANULOCYTES NFR BLD: 0.3 %
LYMPHOCYTES # BLD AUTO: 10 10E9/L (ref 0.8–5.3)
LYMPHOCYTES NFR BLD AUTO: 62.8 %
MCH RBC QN AUTO: 30.9 PG (ref 26.5–33)
MCHC RBC AUTO-ENTMCNC: 33 G/DL (ref 31.5–36.5)
MCV RBC AUTO: 94 FL (ref 78–100)
MONOCYTES # BLD AUTO: 0.6 10E9/L (ref 0–1.3)
MONOCYTES NFR BLD AUTO: 4 %
NEUTROPHILS # BLD AUTO: 4.9 10E9/L (ref 1.6–8.3)
NEUTROPHILS NFR BLD AUTO: 30.9 %
NRBC # BLD AUTO: 0 10*3/UL
NRBC BLD AUTO-RTO: 0 /100
PLATELET # BLD AUTO: 202 10E9/L (ref 150–450)
PLATELET # BLD EST: ABNORMAL 10*3/UL
POTASSIUM SERPL-SCNC: 4.5 MMOL/L (ref 3.4–5.3)
RBC # BLD AUTO: 4.75 10E12/L (ref 4.4–5.9)
RBC MORPH BLD: NORMAL
SODIUM SERPL-SCNC: 135 MMOL/L (ref 133–144)
TROPONIN I SERPL-MCNC: <0.015 UG/L (ref 0–0.04)
WBC # BLD AUTO: 15.9 10E9/L (ref 4–11)

## 2021-03-15 PROCEDURE — 93005 ELECTROCARDIOGRAM TRACING: CPT | Performed by: FAMILY MEDICINE

## 2021-03-15 PROCEDURE — 84484 ASSAY OF TROPONIN QUANT: CPT | Performed by: FAMILY MEDICINE

## 2021-03-15 PROCEDURE — 99284 EMERGENCY DEPT VISIT MOD MDM: CPT | Performed by: FAMILY MEDICINE

## 2021-03-15 PROCEDURE — 85025 COMPLETE CBC W/AUTO DIFF WBC: CPT | Performed by: FAMILY MEDICINE

## 2021-03-15 PROCEDURE — 80048 BASIC METABOLIC PNL TOTAL CA: CPT | Performed by: FAMILY MEDICINE

## 2021-03-15 PROCEDURE — 93010 ELECTROCARDIOGRAM REPORT: CPT | Performed by: FAMILY MEDICINE

## 2021-03-15 PROCEDURE — 99284 EMERGENCY DEPT VISIT MOD MDM: CPT | Mod: 25 | Performed by: FAMILY MEDICINE

## 2021-03-15 RX ORDER — ACETAMINOPHEN 500 MG
1000 TABLET ORAL EVERY 6 HOURS PRN
COMMUNITY
End: 2024-07-13

## 2021-03-15 RX ORDER — SENNOSIDES A AND B 8.6 MG/1
17.2 TABLET, FILM COATED ORAL AT BEDTIME
COMMUNITY
Start: 2021-02-09

## 2021-03-15 RX ORDER — PANTOPRAZOLE SODIUM 40 MG/1
40 TABLET, DELAYED RELEASE ORAL DAILY
COMMUNITY
Start: 2021-02-09

## 2021-03-15 RX ORDER — METOPROLOL TARTRATE 25 MG/1
12.5 TABLET, FILM COATED ORAL 2 TIMES DAILY
COMMUNITY
Start: 2021-02-09 | End: 2023-10-06

## 2021-03-15 RX ORDER — ATORVASTATIN CALCIUM 40 MG/1
40 TABLET, FILM COATED ORAL AT BEDTIME
Status: ON HOLD | COMMUNITY
Start: 2021-02-09 | End: 2023-11-20

## 2021-03-15 RX ORDER — LEVOTHYROXINE SODIUM 50 UG/1
50 TABLET ORAL
COMMUNITY
Start: 2021-02-09

## 2021-03-15 RX ORDER — GABAPENTIN 300 MG/1
900 CAPSULE ORAL 2 TIMES DAILY
COMMUNITY
Start: 2021-02-09

## 2021-03-15 RX ORDER — METFORMIN HCL 500 MG
500 TABLET, EXTENDED RELEASE 24 HR ORAL 2 TIMES DAILY WITH MEALS
COMMUNITY
Start: 2021-02-10 | End: 2022-05-30

## 2021-03-15 RX ORDER — NITROGLYCERIN 0.4 MG/1
0.4 TABLET SUBLINGUAL EVERY 5 MIN PRN
COMMUNITY
Start: 2021-02-09

## 2021-03-15 RX ORDER — FAMOTIDINE 20 MG/1
20 TABLET, FILM COATED ORAL AT BEDTIME
COMMUNITY
Start: 2021-02-09 | End: 2021-05-19

## 2021-03-15 RX ORDER — CLOPIDOGREL BISULFATE 75 MG/1
75 TABLET ORAL DAILY
COMMUNITY
Start: 2021-03-03 | End: 2022-12-16

## 2021-03-15 RX ORDER — LISINOPRIL 5 MG/1
2.5 TABLET ORAL DAILY
COMMUNITY
Start: 2021-02-09 | End: 2022-12-16

## 2021-03-15 NOTE — ED PROVIDER NOTES
History     Chief Complaint   Patient presents with     Chest Pain     HPI  Brannon Boggs is a 81 year old male who presents with chest pain has been going on for the last 2 weeks since patient had a stent placed down at [facility].  Patient states that he has been having chest pain ever since then.  It is over on the left side of his chest.  Denies any shortness of breath, denies feeling lightheaded or dizzy.  Patient denies any nausea any vomiting.  His home health aide got concerned when she heard about the chest pain and sent the patient in for evaluation.  Denies any diarrhea or dysuria.  Nothing makes his symptoms better or worse.    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    Past Surgical History:    No past surgical history on file.    Family History:    No family history on file.    Social History:  Marital Status:   [2]  Social History     Tobacco Use     Smoking status: Former Smoker     Smokeless tobacco: Never Used   Substance Use Topics     Alcohol use: Not Currently     Frequency: Never     Drug use: Not Currently        Medications:    blood glucose (ACCU-CHEK MAGGIE PLUS) test strip  blood glucose (ACCU-CHEK SOFTCLIX) lancing device  blood glucose monitoring (SOFTCLIX) lancets  famotidine (PEPCID) 20 MG tablet  gabapentin (NEURONTIN) 100 MG capsule  insulin glargine (LANTUS PEN) 100 UNIT/ML pen  insulin pen needle (31G X 8 MM) 31G X 8 MM miscellaneous  lisinopril (ZESTRIL) 5 MG tablet  metFORMIN (GLUCOPHAGE) 500 MG tablet  metoprolol tartrate (LOPRESSOR) 25 MG tablet  nitroGLYcerin (NITROSTAT) 0.4 MG sublingual tablet  ORDER FOR DME  pantoprazole (PROTONIX) 40 MG EC tablet  traMADol (ULTRAM) 50 MG tablet          Review of Systems   All other systems reviewed  and are negative.      Physical Exam   BP: (!) 171/88  Pulse: 63  Temp: 98.2  F (36.8  C)  Resp: 16  Weight: 103.7 kg (228 lb 9.6 oz)  SpO2: 100 %      Physical Exam  Vitals signs and nursing note reviewed.   Constitutional:       General: He is not in acute distress.     Appearance: He is well-developed. He is not diaphoretic.   HENT:      Head: Normocephalic and atraumatic.      Nose: Nose normal.      Mouth/Throat:      Pharynx: No oropharyngeal exudate.   Eyes:      General: No scleral icterus.     Conjunctiva/sclera: Conjunctivae normal.      Pupils: Pupils are equal, round, and reactive to light.   Neck:      Musculoskeletal: Normal range of motion.   Cardiovascular:      Rate and Rhythm: Normal rate and regular rhythm.      Heart sounds: Normal heart sounds. No murmur. No friction rub.   Pulmonary:      Effort: Pulmonary effort is normal. No respiratory distress.      Breath sounds: Normal breath sounds. No wheezing or rales.   Abdominal:      General: Bowel sounds are normal. There is no distension.      Palpations: Abdomen is soft. There is no mass.      Tenderness: There is no abdominal tenderness. There is no guarding or rebound.   Musculoskeletal: Normal range of motion.         General: No tenderness.   Skin:     General: Skin is warm.      Findings: No rash.   Neurological:      Mental Status: He is alert and oriented to person, place, and time.   Psychiatric:         Judgment: Judgment normal.         ED Course        Procedures               EKG Interpretation:      Interpreted by Wiliam Dixon MD  Time reviewed: now  Symptoms at time of EKG: None   Rhythm: paced  Rate: Normal    Clinical Impression: Ventricular paced rhythm                    Results for orders placed or performed during the hospital encounter of 03/15/21 (from the past 24 hour(s))   Basic metabolic panel   Result Value Ref Range    Sodium 135 133 - 144 mmol/L    Potassium 4.5 3.4 - 5.3 mmol/L    Chloride 103 94 - 109  mmol/L    Carbon Dioxide 25 20 - 32 mmol/L    Anion Gap 7 3 - 14 mmol/L    Glucose 138 (H) 70 - 99 mg/dL    Urea Nitrogen 14 7 - 30 mg/dL    Creatinine 0.90 0.66 - 1.25 mg/dL    GFR Estimate 80 >60 mL/min/[1.73_m2]    GFR Estimate If Black >90 >60 mL/min/[1.73_m2]    Calcium 9.2 8.5 - 10.1 mg/dL   CBC with platelets differential   Result Value Ref Range    WBC 15.9 (H) 4.0 - 11.0 10e9/L    RBC Count 4.75 4.4 - 5.9 10e12/L    Hemoglobin 14.7 13.3 - 17.7 g/dL    Hematocrit 44.5 40.0 - 53.0 %    MCV 94 78 - 100 fl    MCH 30.9 26.5 - 33.0 pg    MCHC 33.0 31.5 - 36.5 g/dL    RDW 13.5 10.0 - 15.0 %    Platelet Count 202 150 - 450 10e9/L    Diff Method Automated Method     % Neutrophils 30.9 %    % Lymphocytes 62.8 %    % Monocytes 4.0 %    % Eosinophils 1.4 %    % Basophils 0.6 %    % Immature Granulocytes 0.3 %    Nucleated RBCs 0 0 /100    Absolute Neutrophil 4.9 1.6 - 8.3 10e9/L    Absolute Lymphocytes 10.0 (H) 0.8 - 5.3 10e9/L    Absolute Monocytes 0.6 0.0 - 1.3 10e9/L    Absolute Eosinophils 0.2 0.0 - 0.7 10e9/L    Absolute Basophils 0.1 0.0 - 0.2 10e9/L    Abs Immature Granulocytes 0.0 0 - 0.4 10e9/L    Absolute Nucleated RBC 0.0     RBC Morphology Normal     Platelet Estimate       Automated count confirmed.  Platelet morphology is normal.   Troponin I   Result Value Ref Range    Troponin I ES <0.015 0.000 - 0.045 ug/L       Medications - No data to display    Labs are reviewed and were unremarkable.  With patient having symptoms for more than a week now and having a negative troponin this is not likely cardiac in nature.  This could be muscular or possible acid reflux.  Patient has an appointment with his cardiologist in less than a week, will have him just keep this appointment.  Patient is already on Pepcid so I recommend he continue to do this.  Patient will follow up with his doctor in clinic also next week.    Assessments & Plan (with Medical Decision Making)  Atypical chest pain     I have reviewed the  nursing notes.    I have reviewed the findings, diagnosis, plan and need for follow up with the patient.          Final diagnoses:   Atypical chest pain       3/15/2021   [facility] EMERGENCY DEPT     Wiliam Dixon MD  03/15/21 [id number]

## 2021-03-15 NOTE — ED TRIAGE NOTES
"Pt presents with chest pain that has been going on since out of hospital for stent placement. Pt states \"last few days the worst.\" Pt states right sided chest pain. Pace maker in place. Stent placement was at [facility].Shortness of breath.   "

## 2021-03-25 ENCOUNTER — OFFICE VISIT (OUTPATIENT)
Dept: ORTHOPEDICS | Facility: CLINIC | Age: 81
End: 2021-03-25
Payer: COMMERCIAL

## 2021-03-25 ENCOUNTER — ANCILLARY PROCEDURE (OUTPATIENT)
Dept: GENERAL RADIOLOGY | Facility: CLINIC | Age: 81
End: 2021-03-25
Attending: ORTHOPAEDIC SURGERY
Payer: COMMERCIAL

## 2021-03-25 VITALS
WEIGHT: 228 LBS | HEART RATE: 73 BPM | SYSTOLIC BLOOD PRESSURE: 120 MMHG | HEIGHT: 72 IN | BODY MASS INDEX: 30.88 KG/M2 | RESPIRATION RATE: 14 BRPM | DIASTOLIC BLOOD PRESSURE: 77 MMHG

## 2021-03-25 DIAGNOSIS — Z96.611 HISTORY OF RIGHT SHOULDER REPLACEMENT: Primary | ICD-10-CM

## 2021-03-25 DIAGNOSIS — Z96.611 STATUS POST REVERSE TOTAL REPLACEMENT OF RIGHT SHOULDER: ICD-10-CM

## 2021-03-25 DIAGNOSIS — M25.511 RIGHT SHOULDER PAIN: ICD-10-CM

## 2021-03-25 PROCEDURE — 73030 X-RAY EXAM OF SHOULDER: CPT | Mod: TC | Performed by: RADIOLOGY

## 2021-03-25 PROCEDURE — 99203 OFFICE O/P NEW LOW 30 MIN: CPT | Performed by: ORTHOPAEDIC SURGERY

## 2021-03-25 ASSESSMENT — MIFFLIN-ST. JEOR: SCORE: 1782.2

## 2021-03-25 NOTE — PATIENT INSTRUCTIONS
Right reverse total shoulder arthroplasty is in good position.    The shoulder is a bit stiff and sore though.  Strengthening may help.  You should also work on range of motion.  Doing the same exercises you had right after surgery would be beneficial.  I cannot order [facility], but [facility].  Light use only with right arm.  Don't lift over 15 pounds with right arm.    Check the EMG to see if the neck is involved.  If so, that could explain problems with the shoulder, ulnar nerve transposition, and carpal tunnel release.

## 2021-03-25 NOTE — LETTER
3/25/2021         RE: Brannon Boggs  [address] [facility] [address]        Dear Colleague,    Thank you for referring your patient, Brannon Boggs, to the [facility]. Please see a copy of my visit note below.    Brannon Boggs is a 81 year old male who is seen in consultation at the [facility]  for right shoulder pain.  He has had chronic problems with the right shoulder for many years.  He had unrepairable rotator cuff tear.  He underwent reverse total shoulder arthroplasty on 5/30/2019 by Dr. Cleve Fernandes.  When he was seen on 9/4/2019 in recovery he was doing well.    He had problems with this arm and underwent right ulnar nerve transposition along with carpal tunnel release by Dr. Arteaga on 2/3/2020.  He has never had full recovery from this.  By 6/12/2020 he was seen for 1 year follow-up of his reverse total shoulder arthroplasty, but was having increased pain and weakness.  He was prescribed diclofenac.  He is continued to feel weakness in the arm.  He has pain with lifting the arm.  He has problems both with passive and active motion.  He does have dementia.    X-ray of the right shoulder today shows reverse total shoulder arthroplasty in good position.  There is no sign of loosening or wear.  I reviewed these images with patient and daughter.    History reviewed. No pertinent past medical history.    History reviewed. No pertinent surgical history.    History reviewed. No pertinent family history.    Social History     Socioeconomic History     Marital status:      Spouse name: Not on file     Number of children: Not on file     Years of education: Not on file     Highest education level: Not on file   Occupational History     Not on file   Social Needs     Financial resource strain: Not on file     Food insecurity     Worry: Not on file     Inability: Not on file     Transportation needs     Medical: Not on file     Non-medical: Not on file    Tobacco Use     Smoking status: Former Smoker     Smokeless tobacco: Never Used   Substance and Sexual Activity     Alcohol use: Not Currently     Frequency: Never     Drug use: Not Currently     Sexual activity: Not on file   Lifestyle     Physical activity     Days per week: Not on file     Minutes per session: Not on file     Stress: Not on file   Relationships     Social connections     Talks on phone: Not on file     Gets together: Not on file     Attends Ep[employer] service: Not on file     Active member of club or organization: Not on file     Attends meetings of clubs or organizations: Not on file     Relationship status: Not on file     Intimate partner violence     Fear of current or ex partner: Not on file     Emotionally abused: Not on file     Physically abused: Not on file     Forced sexual activity: Not on file   Other Topics Concern     Not on file   Social History Narrative     Not on file       Current Outpatient Medications   Medication Sig Dispense Refill     acetaminophen (TYLENOL) 500 MG tablet Take 1,000 mg by mouth every 6 hours as needed for pain       aspirin (ASA) 81 MG EC tablet Take 81 mg by mouth daily       atorvastatin (LIPITOR) 40 MG tablet Take 40 mg by mouth daily       blood glucose (ACCU-CHEK MAGGIE PLUS) test strip 1 strip by In Vitro route daily       blood glucose (ACCU-CHEK SOFTCLIX) lancing device daily       blood glucose monitoring (SOFTCLIX) lancets 1 each by In Vitro route daily       clopidogrel (PLAVIX) 75 MG tablet Take 75 mg by mouth daily       famotidine (PEPCID) 20 MG tablet Take 20 mg by mouth At Bedtime       gabapentin (NEURONTIN) 300 MG capsule Take 300 mg by mouth daily       insulin glargine (LANTUS SOLOSTAR) 100 UNIT/ML pen Inject 34 Units Subcutaneous every morning       insulin pen needle (31G X 8 MM) 31G X 8 MM miscellaneous Inject 1 each Subcutaneous daily For lantus       levothyroxine (SYNTHROID/LEVOTHROID) 50 MCG tablet Take 50 mcg by mouth daily  before breakfast       lisinopril (ZESTRIL) 5 MG tablet Take 2.5 mg by mouth daily       metFORMIN (GLUCOPHAGE-XR) 500 MG 24 hr tablet Take 500 mg by mouth 2 times daily (with meals)       metoprolol tartrate (LOPRESSOR) 25 MG tablet Take 12.5 mg by mouth 2 times daily       nitroGLYcerin (NITROSTAT) 0.4 MG sublingual tablet Place 0.4 mg under the tongue every 5 minutes as needed for chest pain       ORDER FOR DME Equipment being ordered: Walker 1 each 0     pantoprazole (PROTONIX) 40 MG EC tablet Take 40 mg by mouth daily 1/2 hour before eating       Psyllium (NATURAL FIBER THERAPY) 48.57 % POWD Take 3 tsp by mouth daily       senna (SENOKOT) 8.6 MG tablet Take 17.2 mg by mouth At Bedtime       traMADol (ULTRAM) 50 MG tablet Take 50 mg by mouth       vitamin B-12 (CYANOCOBALAMIN) 1000 MCG tablet Take 1,000 mcg by mouth daily         Allergies   Allergen Reactions     No Known Allergies        REVIEW OF SYSTEMS:  CONSTITUTIONAL:  NEGATIVE for fever, chills, change in weight, not feeling tired  SKIN:  NEGATIVE for worrisome rashes, no skin lumps, no skin ulcers and no non-healing wounds  EYES:  NEGATIVE for vision changes or irritation.  ENT/MOUTH:  NEGATIVE.  No hearing loss, no hoarseness, no difficulty swallowing.  RESP:  NEGATIVE. No cough or shortness of breath.  CV:  NEGATIVE for chest pain, palpitations or peripheral edema  GI:  NEGATIVE for nausea, abdominal pain, heartburn, or change in bowel habits  :  Negative. No dysuria, no hematuria  MUSCULOSKELETAL:  See HPI above  NEURO:  NEGATIVE . No headaches, no dizziness,  no numbness  ENDOCRINE:  NEGATIVE for temperature intolerance, skin/hair changes  HEME/ALLERGY/IMMUNE:  NEGATIVE for bleeding problems  PSYCHIATRIC:  NEGATIVE. no anxiety, no depression.     Exam:  Vitals: /77   Pulse 73   Resp 14   Ht 1.829 m (6')   Wt 103.4 kg (228 lb)   BMI 30.92 kg/m    BMI= Body mass index is 30.92 kg/m .  Constitutional:  healthy, alert and no  distress  Neuro: Alert and Oriented x 3, Sensation grossly WNL.  Psych: Affect normal   Respiratory: Breathing not labored.  Cardiovascular: normal peripheral pulses  Lymph: no adenopathy  Skin: No rashes,worrisome lesions or skin problems  He has good passive motion with flexion to 150 degrees, external rotation to 70 degrees, internal rotation to 60 degrees.  All of these motions are painful however.  He stops active motion at 50 degrees flexion.  This is due to pain.  He has pain with resisted internal rotation and external rotation.  He has less pain with resisted abduction.  He has definite weakness of resisted external rotation as expected.  He has mild weakness of resisted abduction.  He has pain and weakness with resisted flexion.  He indicates persistent tenderness over the medial aspect of the elbow and at the wrist at his previous ulnar nerve transposition and carpal tunnel release.    Assessment:  Right reverse total shoulder arthroplasty with pain on active and passive range of motion.  The prosthesis looks to be in good position by x-ray.  Usually pain at this point would be deltoid fatigue.  Is also possible that neurologic abnormalities such as cervical compression may be the source of his arm problems.  This could add both to the shoulder weakness and lack of recovery from his carpal tunnel and ulnar nerve transposition.     Plan: I would recommend very light use of the right shoulder.  He could do gentle strengthening of the rotator cuff.  He should probably have neurologic evaluation of his right arm and neck as this would be a potentially reversible cause.  Otherwise, the shoulder may just have deltoid fatigue and we would need to live within his limitations.      Again, thank you for allowing me to participate in the care of your patient.        Sincerely,        Jose Manuel Bolanos MD

## 2021-03-27 PROBLEM — Z96.611 STATUS POST REVERSE TOTAL REPLACEMENT OF RIGHT SHOULDER: Status: ACTIVE | Noted: 2021-03-27

## 2021-03-27 NOTE — PROGRESS NOTES
Brannon Boggs is a 81 year old male who is seen in consultation at the [facility]  for right shoulder pain.  He has had chronic problems with the right shoulder for many years.  He had unrepairable rotator cuff tear.  He underwent reverse total shoulder arthroplasty on 5/30/2019 by Dr. Cleve Fernandes.  When he was seen on 9/4/2019 in recovery he was doing well.    He had problems with this arm and underwent right ulnar nerve transposition along with carpal tunnel release by Dr. Arteaga on 2/3/2020.  He has never had full recovery from this.  By 6/12/2020 he was seen for 1 year follow-up of his reverse total shoulder arthroplasty, but was having increased pain and weakness.  He was prescribed diclofenac.  He is continued to feel weakness in the arm.  He has pain with lifting the arm.  He has problems both with passive and active motion.  He does have dementia.    X-ray of the right shoulder today shows reverse total shoulder arthroplasty in good position.  There is no sign of loosening or wear.  I reviewed these images with patient and daughter.    History reviewed. No pertinent past medical history.    History reviewed. No pertinent surgical history.    History reviewed. No pertinent family history.    Social History     Socioeconomic History     Marital status:      Spouse name: Not on file     Number of children: Not on file     Years of education: Not on file     Highest education level: Not on file   Occupational History     Not on file   Social Needs     Financial resource strain: Not on file     Food insecurity     Worry: Not on file     Inability: Not on file     Transportation needs     Medical: Not on file     Non-medical: Not on file   Tobacco Use     Smoking status: Former Smoker     Smokeless tobacco: Never Used   Substance and Sexual Activity     Alcohol use: Not Currently     Frequency: Never     Drug use: Not Currently     Sexual activity: Not on file   Lifestyle     Physical  activity     Days per week: Not on file     Minutes per session: Not on file     Stress: Not on file   Relationships     Social connections     Talks on phone: Not on file     Gets together: Not on file     Attends Gnosticism service: Not on file     Active member of club or organization: Not on file     Attends meetings of clubs or organizations: Not on file     Relationship status: Not on file     Intimate partner violence     Fear of current or ex partner: Not on file     Emotionally abused: Not on file     Physically abused: Not on file     Forced sexual activity: Not on file   Other Topics Concern     Not on file   Social History Narrative     Not on file       Current Outpatient Medications   Medication Sig Dispense Refill     acetaminophen (TYLENOL) 500 MG tablet Take 1,000 mg by mouth every 6 hours as needed for pain       aspirin (ASA) 81 MG EC tablet Take 81 mg by mouth daily       atorvastatin (LIPITOR) 40 MG tablet Take 40 mg by mouth daily       blood glucose (ACCU-CHEK MAGGIE PLUS) test strip 1 strip by In Vitro route daily       blood glucose (ACCU-CHEK SOFTCLIX) lancing device daily       blood glucose monitoring (SOFTCLIX) lancets 1 each by In Vitro route daily       clopidogrel (PLAVIX) 75 MG tablet Take 75 mg by mouth daily       famotidine (PEPCID) 20 MG tablet Take 20 mg by mouth At Bedtime       gabapentin (NEURONTIN) 300 MG capsule Take 300 mg by mouth daily       insulin glargine (LANTUS SOLOSTAR) 100 UNIT/ML pen Inject 34 Units Subcutaneous every morning       insulin pen needle (31G X 8 MM) 31G X 8 MM miscellaneous Inject 1 each Subcutaneous daily For lantus       levothyroxine (SYNTHROID/LEVOTHROID) 50 MCG tablet Take 50 mcg by mouth daily before breakfast       lisinopril (ZESTRIL) 5 MG tablet Take 2.5 mg by mouth daily       metFORMIN (GLUCOPHAGE-XR) 500 MG 24 hr tablet Take 500 mg by mouth 2 times daily (with meals)       metoprolol tartrate (LOPRESSOR) 25 MG tablet Take 12.5 mg by mouth  2 times daily       nitroGLYcerin (NITROSTAT) 0.4 MG sublingual tablet Place 0.4 mg under the tongue every 5 minutes as needed for chest pain       ORDER FOR DME Equipment being ordered: Walker 1 each 0     pantoprazole (PROTONIX) 40 MG EC tablet Take 40 mg by mouth daily 1/2 hour before eating       Psyllium (NATURAL FIBER THERAPY) 48.57 % POWD Take 3 tsp by mouth daily       senna (SENOKOT) 8.6 MG tablet Take 17.2 mg by mouth At Bedtime       traMADol (ULTRAM) 50 MG tablet Take 50 mg by mouth       vitamin B-12 (CYANOCOBALAMIN) 1000 MCG tablet Take 1,000 mcg by mouth daily         Allergies   Allergen Reactions     No Known Allergies        REVIEW OF SYSTEMS:  CONSTITUTIONAL:  NEGATIVE for fever, chills, change in weight, not feeling tired  SKIN:  NEGATIVE for worrisome rashes, no skin lumps, no skin ulcers and no non-healing wounds  EYES:  NEGATIVE for vision changes or irritation.  ENT/MOUTH:  NEGATIVE.  No hearing loss, no hoarseness, no difficulty swallowing.  RESP:  NEGATIVE. No cough or shortness of breath.  CV:  NEGATIVE for chest pain, palpitations or peripheral edema  GI:  NEGATIVE for nausea, abdominal pain, heartburn, or change in bowel habits  :  Negative. No dysuria, no hematuria  MUSCULOSKELETAL:  See HPI above  NEURO:  NEGATIVE . No headaches, no dizziness,  no numbness  ENDOCRINE:  NEGATIVE for temperature intolerance, skin/hair changes  HEME/ALLERGY/IMMUNE:  NEGATIVE for bleeding problems  PSYCHIATRIC:  NEGATIVE. no anxiety, no depression.     Exam:  Vitals: /77   Pulse 73   Resp 14   Ht 1.829 m (6')   Wt 103.4 kg (228 lb)   BMI 30.92 kg/m    BMI= Body mass index is 30.92 kg/m .  Constitutional:  healthy, alert and no distress  Neuro: Alert and Oriented x 3, Sensation grossly WNL.  Psych: Affect normal   Respiratory: Breathing not labored.  Cardiovascular: normal peripheral pulses  Lymph: no adenopathy  Skin: No rashes,worrisome lesions or skin problems  He has good passive motion  with flexion to 150 degrees, external rotation to 70 degrees, internal rotation to 60 degrees.  All of these motions are painful however.  He stops active motion at 50 degrees flexion.  This is due to pain.  He has pain with resisted internal rotation and external rotation.  He has less pain with resisted abduction.  He has definite weakness of resisted external rotation as expected.  He has mild weakness of resisted abduction.  He has pain and weakness with resisted flexion.  He indicates persistent tenderness over the medial aspect of the elbow and at the wrist at his previous ulnar nerve transposition and carpal tunnel release.    Assessment:  Right reverse total shoulder arthroplasty with pain on active and passive range of motion.  The prosthesis looks to be in good position by x-ray.  Usually pain at this point would be deltoid fatigue.  Is also possible that neurologic abnormalities such as cervical compression may be the source of his arm problems.  This could add both to the shoulder weakness and lack of recovery from his carpal tunnel and ulnar nerve transposition.     Plan: I would recommend very light use of the right shoulder.  He could do gentle strengthening of the rotator cuff.  He should probably have neurologic evaluation of his right arm and neck as this would be a potentially reversible cause.  Otherwise, the shoulder may just have deltoid fatigue and we would need to live within his limitations.

## 2021-05-18 ENCOUNTER — HOSPITAL ENCOUNTER (EMERGENCY)
Facility: CLINIC | Age: 81
Discharge: HOME | End: 2021-05-18
Attending: FAMILY MEDICINE | Admitting: FAMILY MEDICINE
Payer: COMMERCIAL

## 2021-05-18 ENCOUNTER — APPOINTMENT (OUTPATIENT)
Dept: GENERAL RADIOLOGY | Facility: CLINIC | Age: 81
End: 2021-05-18
Attending: FAMILY MEDICINE
Payer: COMMERCIAL

## 2021-05-18 VITALS
DIASTOLIC BLOOD PRESSURE: 61 MMHG | OXYGEN SATURATION: 95 % | TEMPERATURE: 97.7 F | RESPIRATION RATE: 12 BRPM | HEART RATE: 60 BPM | SYSTOLIC BLOOD PRESSURE: 133 MMHG

## 2021-05-18 DIAGNOSIS — R07.89 ATYPICAL CHEST PAIN: ICD-10-CM

## 2021-05-18 DIAGNOSIS — G62.9 PERIPHERAL POLYNEUROPATHY: ICD-10-CM

## 2021-05-18 LAB
ALBUMIN SERPL-MCNC: 3.6 G/DL (ref 3.4–5)
ALP SERPL-CCNC: 120 U/L (ref 40–150)
ALT SERPL W P-5'-P-CCNC: 19 U/L (ref 0–70)
ANION GAP SERPL CALCULATED.3IONS-SCNC: 6 MMOL/L (ref 3–14)
AST SERPL W P-5'-P-CCNC: 14 U/L (ref 0–45)
BASOPHILS # BLD AUTO: 0.1 10E9/L (ref 0–0.2)
BASOPHILS NFR BLD AUTO: 0.4 %
BILIRUB SERPL-MCNC: 0.4 MG/DL (ref 0.2–1.3)
BUN SERPL-MCNC: 12 MG/DL (ref 7–30)
CALCIUM SERPL-MCNC: 8.7 MG/DL (ref 8.5–10.1)
CHLORIDE SERPL-SCNC: 105 MMOL/L (ref 94–109)
CO2 SERPL-SCNC: 25 MMOL/L (ref 20–32)
CREAT SERPL-MCNC: 0.83 MG/DL (ref 0.66–1.25)
DIFFERENTIAL METHOD BLD: ABNORMAL
EOSINOPHIL # BLD AUTO: 0.2 10E9/L (ref 0–0.7)
EOSINOPHIL NFR BLD AUTO: 1.4 %
ERYTHROCYTE [DISTWIDTH] IN BLOOD BY AUTOMATED COUNT: 12.9 % (ref 10–15)
GFR SERPL CREATININE-BSD FRML MDRD: 83 ML/MIN/{1.73_M2}
GLUCOSE SERPL-MCNC: 195 MG/DL (ref 70–99)
HCT VFR BLD AUTO: 41.5 % (ref 40–53)
HGB BLD-MCNC: 13.9 G/DL (ref 13.3–17.7)
IMM GRANULOCYTES # BLD: 0 10E9/L (ref 0–0.4)
IMM GRANULOCYTES NFR BLD: 0.2 %
LYMPHOCYTES # BLD AUTO: 10.1 10E9/L (ref 0.8–5.3)
LYMPHOCYTES NFR BLD AUTO: 63.2 %
MCH RBC QN AUTO: 30.5 PG (ref 26.5–33)
MCHC RBC AUTO-ENTMCNC: 33.5 G/DL (ref 31.5–36.5)
MCV RBC AUTO: 91 FL (ref 78–100)
MONOCYTES # BLD AUTO: 0.7 10E9/L (ref 0–1.3)
MONOCYTES NFR BLD AUTO: 4.5 %
NEUTROPHILS # BLD AUTO: 4.8 10E9/L (ref 1.6–8.3)
NEUTROPHILS NFR BLD AUTO: 30.3 %
NRBC # BLD AUTO: 0 10*3/UL
NRBC BLD AUTO-RTO: 0 /100
NT-PROBNP SERPL-MCNC: 68 PG/ML (ref 0–1800)
PLATELET # BLD AUTO: 181 10E9/L (ref 150–450)
PLATELET # BLD EST: ABNORMAL 10*3/UL
POTASSIUM SERPL-SCNC: 4.4 MMOL/L (ref 3.4–5.3)
PROT SERPL-MCNC: 7.1 G/DL (ref 6.8–8.8)
RBC # BLD AUTO: 4.55 10E12/L (ref 4.4–5.9)
RBC MORPH BLD: NORMAL
SODIUM SERPL-SCNC: 136 MMOL/L (ref 133–144)
TROPONIN I SERPL-MCNC: <0.015 UG/L (ref 0–0.04)
WBC # BLD AUTO: 16 10E9/L (ref 4–11)

## 2021-05-18 PROCEDURE — 84484 ASSAY OF TROPONIN QUANT: CPT | Performed by: FAMILY MEDICINE

## 2021-05-18 PROCEDURE — 250N000013 HC RX MED GY IP 250 OP 250 PS 637: Performed by: FAMILY MEDICINE

## 2021-05-18 PROCEDURE — 71045 X-RAY EXAM CHEST 1 VIEW: CPT

## 2021-05-18 PROCEDURE — 99285 EMERGENCY DEPT VISIT HI MDM: CPT | Mod: 25 | Performed by: FAMILY MEDICINE

## 2021-05-18 PROCEDURE — 83880 ASSAY OF NATRIURETIC PEPTIDE: CPT | Performed by: FAMILY MEDICINE

## 2021-05-18 PROCEDURE — 93005 ELECTROCARDIOGRAM TRACING: CPT | Performed by: FAMILY MEDICINE

## 2021-05-18 PROCEDURE — 93010 ELECTROCARDIOGRAM REPORT: CPT | Performed by: FAMILY MEDICINE

## 2021-05-18 PROCEDURE — 80053 COMPREHEN METABOLIC PANEL: CPT | Performed by: FAMILY MEDICINE

## 2021-05-18 PROCEDURE — 85025 COMPLETE CBC W/AUTO DIFF WBC: CPT | Performed by: FAMILY MEDICINE

## 2021-05-18 RX ORDER — ACETAMINOPHEN 325 MG/1
975 TABLET ORAL ONCE
Status: COMPLETED | OUTPATIENT
Start: 2021-05-18 | End: 2021-05-18

## 2021-05-18 RX ADMIN — ACETAMINOPHEN 975 MG: 325 TABLET, FILM COATED ORAL at 21:08

## 2021-05-19 ENCOUNTER — HOSPITAL ENCOUNTER (EMERGENCY)
Facility: CLINIC | Age: 81
Discharge: HOME | End: 2021-05-19
Attending: FAMILY MEDICINE | Admitting: FAMILY MEDICINE
Payer: COMMERCIAL

## 2021-05-19 ENCOUNTER — APPOINTMENT (OUTPATIENT)
Dept: GENERAL RADIOLOGY | Facility: CLINIC | Age: 81
End: 2021-05-19
Attending: FAMILY MEDICINE
Payer: COMMERCIAL

## 2021-05-19 VITALS
DIASTOLIC BLOOD PRESSURE: 68 MMHG | HEART RATE: 59 BPM | RESPIRATION RATE: 12 BRPM | TEMPERATURE: 97.5 F | WEIGHT: 228.4 LBS | SYSTOLIC BLOOD PRESSURE: 136 MMHG | OXYGEN SATURATION: 94 % | BODY MASS INDEX: 30.98 KG/M2

## 2021-05-19 DIAGNOSIS — R07.9 ACUTE CHEST PAIN: ICD-10-CM

## 2021-05-19 DIAGNOSIS — K21.00 GASTROESOPHAGEAL REFLUX DISEASE WITH ESOPHAGITIS WITHOUT HEMORRHAGE: ICD-10-CM

## 2021-05-19 DIAGNOSIS — D72.820 LYMPHOCYTOSIS: ICD-10-CM

## 2021-05-19 LAB
ALBUMIN SERPL-MCNC: 3.7 G/DL (ref 3.4–5)
ALP SERPL-CCNC: 133 U/L (ref 40–150)
ALT SERPL W P-5'-P-CCNC: 33 U/L (ref 0–70)
ANION GAP SERPL CALCULATED.3IONS-SCNC: 4 MMOL/L (ref 3–14)
AST SERPL W P-5'-P-CCNC: 41 U/L (ref 0–45)
BILIRUB SERPL-MCNC: 0.4 MG/DL (ref 0.2–1.3)
BUN SERPL-MCNC: 14 MG/DL (ref 7–30)
CALCIUM SERPL-MCNC: 9 MG/DL (ref 8.5–10.1)
CHLORIDE SERPL-SCNC: 104 MMOL/L (ref 94–109)
CO2 SERPL-SCNC: 27 MMOL/L (ref 20–32)
CREAT SERPL-MCNC: 0.99 MG/DL (ref 0.66–1.25)
CRP SERPL-MCNC: <2.9 MG/L (ref 0–8)
D DIMER PPP FEU-MCNC: 0.4 UG/ML FEU (ref 0–0.5)
GFR SERPL CREATININE-BSD FRML MDRD: 72 ML/MIN/{1.73_M2}
GLUCOSE SERPL-MCNC: 193 MG/DL (ref 70–99)
INR PPP: 0.98 (ref 0.86–1.14)
LIPASE SERPL-CCNC: 105 U/L (ref 73–393)
POTASSIUM SERPL-SCNC: 4.3 MMOL/L (ref 3.4–5.3)
PROT SERPL-MCNC: 7 G/DL (ref 6.8–8.8)
SODIUM SERPL-SCNC: 135 MMOL/L (ref 133–144)
TROPONIN I SERPL-MCNC: <0.015 UG/L (ref 0–0.04)

## 2021-05-19 PROCEDURE — 93005 ELECTROCARDIOGRAM TRACING: CPT | Performed by: FAMILY MEDICINE

## 2021-05-19 PROCEDURE — 85610 PROTHROMBIN TIME: CPT | Performed by: FAMILY MEDICINE

## 2021-05-19 PROCEDURE — 71045 X-RAY EXAM CHEST 1 VIEW: CPT

## 2021-05-19 PROCEDURE — 80053 COMPREHEN METABOLIC PANEL: CPT | Performed by: FAMILY MEDICINE

## 2021-05-19 PROCEDURE — 85025 COMPLETE CBC W/AUTO DIFF WBC: CPT | Performed by: FAMILY MEDICINE

## 2021-05-19 PROCEDURE — 99285 EMERGENCY DEPT VISIT HI MDM: CPT | Mod: 25 | Performed by: FAMILY MEDICINE

## 2021-05-19 PROCEDURE — 86140 C-REACTIVE PROTEIN: CPT | Performed by: FAMILY MEDICINE

## 2021-05-19 PROCEDURE — 84484 ASSAY OF TROPONIN QUANT: CPT | Performed by: FAMILY MEDICINE

## 2021-05-19 PROCEDURE — 83690 ASSAY OF LIPASE: CPT | Performed by: FAMILY MEDICINE

## 2021-05-19 PROCEDURE — 93010 ELECTROCARDIOGRAM REPORT: CPT | Performed by: FAMILY MEDICINE

## 2021-05-19 PROCEDURE — 85379 FIBRIN DEGRADATION QUANT: CPT | Performed by: FAMILY MEDICINE

## 2021-05-19 PROCEDURE — 250N000009 HC RX 250: Performed by: FAMILY MEDICINE

## 2021-05-19 PROCEDURE — 250N000013 HC RX MED GY IP 250 OP 250 PS 637: Performed by: FAMILY MEDICINE

## 2021-05-19 RX ORDER — ALUMINA, MAGNESIA, AND SIMETHICONE 2400; 2400; 240 MG/30ML; MG/30ML; MG/30ML
30 SUSPENSION ORAL EVERY 4 HOURS PRN
Refills: 0 | COMMUNITY
Start: 2021-05-19 | End: 2023-10-06

## 2021-05-19 RX ORDER — SUCRALFATE 1 G/1
1 TABLET ORAL 4 TIMES DAILY PRN
Qty: 40 TABLET | Refills: 0 | Status: SHIPPED | OUTPATIENT
Start: 2021-05-19 | End: 2021-05-29

## 2021-05-19 RX ORDER — FAMOTIDINE 20 MG/1
40 TABLET, FILM COATED ORAL AT BEDTIME
Start: 2021-05-19

## 2021-05-19 RX ADMIN — LIDOCAINE HYDROCHLORIDE 30 ML: 20 SOLUTION ORAL; TOPICAL at 21:25

## 2021-05-19 NOTE — ED PROVIDER NOTES
History     Chief Complaint   Patient presents with     Chest Pain     HPI  Brannon Boggs is a 81 year old male who presents with chest pain that started this evening.  Patient has dementia so history is somewhat limited.  Patient was also complained of increasing neuropathy in his feet.  Patient denied any shortness of breath or any nausea or any vomiting.  EMS came and they gave him some aspirin and nitro and by the time he arrived here he did not complain of any pain anymore.  He asked he does not even remember how he got here.  Patient states he feels fine right now although both of his feet are uncomfortable.  Denies any recent fevers or chills.  Has been no recent nausea or vomiting.    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    Past Surgical History:    No past surgical history on file.    Family History:    No family history on file.    Social History:  Marital Status:   [2]  Social History     Tobacco Use     Smoking status: Former Smoker     Smokeless tobacco: Never Used   Substance Use Topics     Alcohol use: Not Currently     Frequency: Never     Drug use: Not Currently        Medications:    acetaminophen (TYLENOL) 500 MG tablet  aspirin (ASA) 81 MG EC tablet  atorvastatin (LIPITOR) 40 MG tablet  blood glucose (ACCU-CHEK MAGGIE PLUS) test strip  blood glucose monitoring (SOFTCLIX) lancets  clopidogrel (PLAVIX) 75 MG tablet  famotidine (PEPCID) 20 MG tablet  gabapentin (NEURONTIN) 300 MG capsule  insulin glargine (LANTUS SOLOSTAR) 100 UNIT/ML pen  insulin pen needle (31G X 8 MM) 31G X 8 MM miscellaneous  levothyroxine (SYNTHROID/LEVOTHROID) 50 MCG tablet  lisinopril (ZESTRIL) 5  MG tablet  metFORMIN (GLUCOPHAGE-XR) 500 MG 24 hr tablet  metoprolol tartrate (LOPRESSOR) 25 MG tablet  nitroGLYcerin (NITROSTAT) 0.4 MG sublingual tablet  pantoprazole (PROTONIX) 40 MG EC tablet  Psyllium (NATURAL FIBER THERAPY) 48.57 % POWD  senna (SENOKOT) 8.6 MG tablet  vitamin B-12 (CYANOCOBALAMIN) 1000 MCG tablet  ORDER FOR DME  traMADol (ULTRAM) 50 MG tablet          Review of Systems   All other systems reviewed and are negative.      Physical Exam   BP: (!) 154/67  Pulse: 61  Temp: 97.7  F (36.5  C)  Resp: 18  SpO2: 98 %      Physical Exam  Vitals signs and nursing note reviewed.   Constitutional:       General: He is not in acute distress.     Appearance: He is well-developed. He is not diaphoretic.   HENT:      Head: Normocephalic and atraumatic.      Nose: Nose normal.      Mouth/Throat:      Pharynx: No oropharyngeal exudate.   Eyes:      General: No scleral icterus.     Conjunctiva/sclera: Conjunctivae normal.      Pupils: Pupils are equal, round, and reactive to light.   Neck:      Musculoskeletal: Normal range of motion.   Cardiovascular:      Rate and Rhythm: Normal rate and regular rhythm.      Heart sounds: Normal heart sounds. No murmur. No friction rub.   Pulmonary:      Effort: Pulmonary effort is normal. No respiratory distress.      Breath sounds: Normal breath sounds. No wheezing or rales.   Abdominal:      General: Bowel sounds are normal. There is no distension.      Palpations: Abdomen is soft. There is no mass.      Tenderness: There is no abdominal tenderness. There is no guarding or rebound.   Musculoskeletal: Normal range of motion.         General: No tenderness.   Skin:     General: Skin is warm.      Findings: No rash.   Neurological:      Mental Status: He is alert and oriented to person, place, and time.   Psychiatric:         Judgment: Judgment normal.         ED Course        Procedures    EKG: Reviewed by me  Ventricular paced rhythm  No acute findings noted  Rate  normal    Results for orders placed or performed during the hospital encounter of 05/18/21 (from the past 24 hour(s))   CBC with platelets differential   Result Value Ref Range    WBC 16.0 (H) 4.0 - 11.0 10e9/L    RBC Count 4.55 4.4 - 5.9 10e12/L    Hemoglobin 13.9 13.3 - 17.7 g/dL    Hematocrit 41.5 40.0 - 53.0 %    MCV 91 78 - 100 fl    MCH 30.5 26.5 - 33.0 pg    MCHC 33.5 31.5 - 36.5 g/dL    RDW 12.9 10.0 - 15.0 %    Platelet Count 181 150 - 450 10e9/L    Diff Method Automated Method     % Neutrophils 30.3 %    % Lymphocytes 63.2 %    % Monocytes 4.5 %    % Eosinophils 1.4 %    % Basophils 0.4 %    % Immature Granulocytes 0.2 %    Nucleated RBCs 0 0 /100    Absolute Neutrophil 4.8 1.6 - 8.3 10e9/L    Absolute Lymphocytes 10.1 (H) 0.8 - 5.3 10e9/L    Absolute Monocytes 0.7 0.0 - 1.3 10e9/L    Absolute Eosinophils 0.2 0.0 - 0.7 10e9/L    Absolute Basophils 0.1 0.0 - 0.2 10e9/L    Abs Immature Granulocytes 0.0 0 - 0.4 10e9/L    Absolute Nucleated RBC 0.0     RBC Morphology Normal     Platelet Estimate       Automated count confirmed.  Platelet morphology is normal.   Comprehensive metabolic panel   Result Value Ref Range    Sodium 136 133 - 144 mmol/L    Potassium 4.4 3.4 - 5.3 mmol/L    Chloride 105 94 - 109 mmol/L    Carbon Dioxide 25 20 - 32 mmol/L    Anion Gap 6 3 - 14 mmol/L    Glucose 195 (H) 70 - 99 mg/dL    Urea Nitrogen 12 7 - 30 mg/dL    Creatinine 0.83 0.66 - 1.25 mg/dL    GFR Estimate 83 >60 mL/min/[1.73_m2]    GFR Estimate If Black >90 >60 mL/min/[1.73_m2]    Calcium 8.7 8.5 - 10.1 mg/dL    Bilirubin Total 0.4 0.2 - 1.3 mg/dL    Albumin 3.6 3.4 - 5.0 g/dL    Protein Total 7.1 6.8 - 8.8 g/dL    Alkaline Phosphatase 120 40 - 150 U/L    ALT 19 0 - 70 U/L    AST 14 0 - 45 U/L   Troponin I   Result Value Ref Range    Troponin I ES <0.015 0.000 - 0.045 ug/L   Nt probnp inpatient (BNP)   Result Value Ref Range    N-Terminal Pro BNP Inpatient 68 0 - 1,800 pg/mL   XR Chest Port 1 View    Narrative    CHEST ONE  VIEW  5/18/2021 8:40 PM     HISTORY: Chest pain.    COMPARISON: February 12, 2021      Impression    IMPRESSION: No acute disease.    BISI MARY MD       Medications   acetaminophen (TYLENOL) tablet 975 mg (has no administration in time range)     Labs are reviewed and were unremarkable.  Patient remains pain-free and feels well.  He is complaining of some more feet and leg pain which I think is most likely from his neuropathy.  Patient has normal circulation and range of motion of his feet.  There has been no recent trauma.  We will give some Tylenol to treat this.  Patient is otherwise safe to be discharged back home.    Assessments & Plan (with Medical Decision Making)  Atypical chest pain, peripheral neuropathy     I have reviewed the nursing notes.    I have reviewed the findings, diagnosis, plan and need for follow up with the patient.              5/18/2021   [facility] EMERGENCY DEPT     Wiliam Dixon MD  05/18/21 [id number]

## 2021-05-19 NOTE — ED TRIAGE NOTES
Presents to ED from home via [facility] for concerns of chest pain. Chest pain started around dinner time. Was a 7 at its worst. Was a 2 when EMS arrived. Patient has a pacemaker. Per EMS some mild dementia but is alert x4 in triage. EMS gave 4 baby aspirin and one dose of nitroglycerin which brought patient's pain to a 0. States it feels like a heaviness now. Also endorses worsening bilateral foot numbness today, patient normally has numbness in his feet due to diabetes.

## 2021-05-20 ASSESSMENT — ENCOUNTER SYMPTOMS
HEADACHES: 0
DIZZINESS: 0
LIGHT-HEADEDNESS: 0
MUSCULOSKELETAL NEGATIVE: 1
NUMBNESS: 1
CONFUSION: 1
RESPIRATORY NEGATIVE: 1

## 2021-05-20 NOTE — ED TRIAGE NOTES
"Pt reports chest pain that started about 10 minutes prior to arriving. Wife states she gave him 1 \"nitro Pt states he was having chest pain yesterday and was seen in the ED and sent home. Pt states pain today \"is way worse\".   "

## 2021-05-20 NOTE — ED PROVIDER NOTES
History     Chief Complaint   Patient presents with     Chest Pain     HPI  Brannon Boggs is a 81 year old male who presented emergency room today secondary concerns of anterior chest pain.  Patient states he was seen here yesterday with same concerns.  Patient states that this was a bad 1 he thought he might not make it.  His wife states that he has some dementia issues.  She also states that she did give him nitroglycerin prior to coming to the emergency room.  Patient states that he is feeling better rating his pain at a 3 out of 10 currently.  I asked the patient point to the area of pain and he points to the lower sternum.  Patient denies cough or shortness of breath symptoms.  Denies fever or chills.  He does admit to some upper abdominal pain symptoms.  His wife states that he has had increased belching lately.    Patient with a history for chronically elevated white blood cell count which is felt secondary to agent orange exposure in [place] during the war.  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 No pertinent past medical history.    Past Surgical History:    History reviewed. No pertinent surgical history.    Family History:    History reviewed. No pertinent family history.    Social History:  Marital Status:   [2]  Social History     Tobacco Use     Smoking status: Former Smoker     Smokeless tobacco: Never Used   Substance Use Topics     Alcohol use: Not Currently     Frequency: Never     Drug use: Not Currently        Medications:    alum & mag hydroxide-simethicone (MAALOX ADVANCED MAX ST) 400-400-40 MG/5ML SUSP suspension  famotidine (PEPCID) 20 MG tablet  nitroGLYcerin (NITROSTAT) 0.4 MG  sublingual tablet  sucralfate (CARAFATE) 1 GM tablet  acetaminophen (TYLENOL) 500 MG tablet  aspirin (ASA) 81 MG EC tablet  atorvastatin (LIPITOR) 40 MG tablet  blood glucose (ACCU-CHEK MAGGIE PLUS) test strip  blood glucose monitoring (SOFTCLIX) lancets  clopidogrel (PLAVIX) 75 MG tablet  gabapentin (NEURONTIN) 300 MG capsule  insulin glargine (LANTUS SOLOSTAR) 100 UNIT/ML pen  insulin pen needle (31G X 8 MM) 31G X 8 MM miscellaneous  levothyroxine (SYNTHROID/LEVOTHROID) 50 MCG tablet  lisinopril (ZESTRIL) 5 MG tablet  metFORMIN (GLUCOPHAGE-XR) 500 MG 24 hr tablet  metoprolol tartrate (LOPRESSOR) 25 MG tablet  ORDER FOR DME  pantoprazole (PROTONIX) 40 MG EC tablet  Psyllium (NATURAL FIBER THERAPY) 48.57 % POWD  senna (SENOKOT) 8.6 MG tablet  traMADol (ULTRAM) 50 MG tablet  vitamin B-12 (CYANOCOBALAMIN) 1000 MCG tablet          Review of Systems   Respiratory: Negative.    Genitourinary: Negative.    Musculoskeletal: Negative.    Skin: Negative.    Neurological: Positive for numbness (Patient has chronic numbness in his feet and lower legs bilaterally.  History of neuropathy secondary to diabetes.). Negative for dizziness, light-headedness and headaches.   Psychiatric/Behavioral: Positive for confusion.   All other systems reviewed and are negative.      Physical Exam   BP: 113/63  Pulse: 60  Temp: 97.5  F (36.4  C)  Resp: 24  Weight: 103.6 kg (228 lb 6.4 oz)  SpO2: 93 %      Physical Exam  Vitals signs and nursing note reviewed.   Constitutional:       General: He is not in acute distress.     Appearance: He is not ill-appearing, toxic-appearing or diaphoretic.   HENT:      Head: Normocephalic and atraumatic.   Eyes:      Extraocular Movements: Extraocular movements intact.      Pupils: Pupils are equal, round, and reactive to light.   Neck:      Musculoskeletal: Normal range of motion and neck supple.      Vascular: No JVD.   Cardiovascular:      Rate and Rhythm: Normal rate.      Heart sounds: Normal heart sounds.    Pulmonary:      Effort: Pulmonary effort is normal. No tachypnea or respiratory distress.      Breath sounds: Normal breath sounds.   Musculoskeletal:      Right lower leg: He exhibits no tenderness. No edema.      Left lower leg: He exhibits no tenderness. No edema.   Skin:     Capillary Refill: Capillary refill takes less than 2 seconds.      Coloration: Skin is not pale.      Findings: No erythema.      Nails: There is no clubbing.     Neurological:      General: No focal deficit present.      Mental Status: He is alert.      Sensory: Sensory deficit present.   Psychiatric:         Mood and Affect: Mood normal.         Behavior: Behavior normal.         ED Course        Procedures               EKG Interpretation:      Interpreted by Jose Manuel Hunter DO  Time reviewed: 20:40  Symptoms at time of EKG: Anterior chest pain rates 3/10   Rhythm: paced  Rate: Paced and 64  Comparison to prior: Unchanged from 5/18/21    Clinical Impression: paced rhythm      Critical Care time:  none               Results for orders placed or performed during the hospital encounter of 05/19/21 (from the past 24 hour(s))   CBC with platelets differential   Result Value Ref Range    WBC 17.3 (H) 4.0 - 11.0 10e9/L    RBC Count 4.47 4.4 - 5.9 10e12/L    Hemoglobin 13.9 13.3 - 17.7 g/dL    Hematocrit 41.0 40.0 - 53.0 %    MCV 92 78 - 100 fl    MCH 31.1 26.5 - 33.0 pg    MCHC 33.9 31.5 - 36.5 g/dL    RDW 13.0 10.0 - 15.0 %    Platelet Count 176 150 - 450 10e9/L    Diff Method PENDING    D dimer quantitative   Result Value Ref Range    D Dimer 0.4 0.0 - 0.50 ug/ml FEU   INR   Result Value Ref Range    INR 0.98 0.86 - 1.14   Comprehensive metabolic panel   Result Value Ref Range    Sodium 135 133 - 144 mmol/L    Potassium 4.3 3.4 - 5.3 mmol/L    Chloride 104 94 - 109 mmol/L    Carbon Dioxide 27 20 - 32 mmol/L    Anion Gap 4 3 - 14 mmol/L    Glucose 193 (H) 70 - 99 mg/dL    Urea Nitrogen 14 7 - 30 mg/dL    Creatinine 0.99 0.66 - 1.25  mg/dL    GFR Estimate 72 >60 mL/min/[1.73_m2]    GFR Estimate If Black 83 >60 mL/min/[1.73_m2]    Calcium 9.0 8.5 - 10.1 mg/dL    Bilirubin Total 0.4 0.2 - 1.3 mg/dL    Albumin 3.7 3.4 - 5.0 g/dL    Protein Total 7.0 6.8 - 8.8 g/dL    Alkaline Phosphatase 133 40 - 150 U/L    ALT 33 0 - 70 U/L    AST 41 0 - 45 U/L   Lipase   Result Value Ref Range    Lipase 105 73 - 393 U/L   CRP inflammation   Result Value Ref Range    CRP Inflammation <2.9 0.0 - 8.0 mg/L   Troponin I   Result Value Ref Range    Troponin I ES <0.015 0.000 - 0.045 ug/L   XR Chest Port 1 View    Narrative    CHEST ONE VIEW PORTABLE   5/19/2021 9:20 PM     HISTORY:  Chest pain.    COMPARISON: 5/18/2021.      Impression    IMPRESSION: No significant interval change. Dual-lead cardiac device  left chest wall. Lungs clear. Heart size appears stable. Pulmonary  vascularity is within normal limits. Right shoulder arthroplasty.    JANETH LOUIS MD       Medications   lidocaine (XYLOCAINE) 2 % 15 mL, alum & mag hydroxide-simethicone (MAALOX) 15 mL GI Cocktail (30 mLs Oral Given 5/19/21 2125)       Assessments & Plan (with Medical Decision Making)]'  Patient received a GI cocktail with total resolution of all his pain symptoms.  Rest of his physical exam unremarkable for abnormality.  Patient with normal looking EKG and cardiac enzymes.  Patient monitored for several hours in the ER without any worsening of symptoms or return of symptoms after the GI cocktail given.  The patient [facility] and I encouraged him to pursue this evaluation given his symptoms today.  Patient is already on a PPI and H2 blocker.  We will increase his H2 blocker dose to 40 mg of famotidine before bed each night.  He will continue with his Protonix before breakfast daily.  We will add the use of Maalox and Carafate.  Patient will be discharged to home in the care of his family.  To return the ER for  increase or worsening symptoms if needed.     I have reviewed the nursing notes.    I have reviewed the findings, diagnosis, plan and need for follow up with the patient and his spouse.       Discharge Medication List as of 5/19/2021 10:20 PM      START taking these medications    Details   alum & mag hydroxide-simethicone (MAALOX ADVANCED MAX ST) 400-400-40 MG/5ML SUSP suspension Take 30 mLs by mouth every 4 hours as needed for indigestion or heartburn, R-0, OTC      sucralfate (CARAFATE) 1 GM tablet Take 1 tablet (1 g) by mouth 4 times daily as needed (heartburn), Disp-40 tablet, R-0, Local Print                  I verbally discussed the findings of the evaluation today in the ER. I have verbally discussed with Brannno the suggested treatment(s) as described in the discharge instructions and handouts. I have prescribed the above listed medications and instructed him on appropriate use of these medications.      I have verbally suggested he follow-up in his clinic or return to the ER for increased symptoms. See the follow-up recommendations documented  in the after visit summary in this visit's EPIC chart.      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

## 2021-05-21 LAB
DIFFERENTIAL METHOD BLD: ABNORMAL
EOSINOPHIL # BLD AUTO: 0.2 10E9/L (ref 0–0.7)
EOSINOPHIL NFR BLD AUTO: 1 %
ERYTHROCYTE [DISTWIDTH] IN BLOOD BY AUTOMATED COUNT: 13 % (ref 10–15)
HCT VFR BLD AUTO: 41 % (ref 40–53)
HGB BLD-MCNC: 13.9 G/DL (ref 13.3–17.7)
LYMPHOCYTES # BLD AUTO: 13.3 10E9/L (ref 0.8–5.3)
LYMPHOCYTES NFR BLD AUTO: 77 %
MCH RBC QN AUTO: 31.1 PG (ref 26.5–33)
MCHC RBC AUTO-ENTMCNC: 33.9 G/DL (ref 31.5–36.5)
MCV RBC AUTO: 92 FL (ref 78–100)
MONOCYTES # BLD AUTO: 0.7 10E9/L (ref 0–1.3)
MONOCYTES NFR BLD AUTO: 4 %
NEUTROPHILS # BLD AUTO: 3.1 10E9/L (ref 1.6–8.3)
NEUTROPHILS NFR BLD AUTO: 18 %
PLATELET # BLD AUTO: 176 10E9/L (ref 150–450)
RBC # BLD AUTO: 4.47 10E12/L (ref 4.4–5.9)
WBC # BLD AUTO: 17.3 10E9/L (ref 4–11)

## 2021-05-26 ENCOUNTER — HOSPITAL ENCOUNTER (OUTPATIENT)
Dept: GENERAL RADIOLOGY | Facility: CLINIC | Age: 81
Discharge: HOME | End: 2021-05-26
Attending: NURSE PRACTITIONER | Admitting: NURSE PRACTITIONER
Payer: COMMERCIAL

## 2021-05-26 DIAGNOSIS — R13.10 DYSPHAGIA: ICD-10-CM

## 2021-05-26 DIAGNOSIS — R07.9 CHEST PAIN: ICD-10-CM

## 2021-05-26 DIAGNOSIS — R93.89 ABNORMAL FINDING ON CT SCAN: ICD-10-CM

## 2021-05-26 PROCEDURE — 74220 X-RAY XM ESOPHAGUS 1CNTRST: CPT

## 2021-05-26 PROCEDURE — 255N000001 HC RX 255: Performed by: RADIOLOGY

## 2021-05-26 RX ADMIN — ANTACID/ANTIFLATULENT 4 G: 380; 550; 10; 10 GRANULE, EFFERVESCENT ORAL at 08:30

## 2021-05-26 NOTE — PROGRESS NOTES
Appropriate assistive devices provided during their visit. y (Yes, No, N/A) cane (list device)    Exam table and/or cart  placed in the lowest position. y (Yes, No, N/A)    Brakes on tables/carts/wheelchairs used at all times. na (Yes, No, N/A)    Non slip footwear applied. y (Yes, No, NA)    Patient was accompanied by staff throughout visit. y (Yes, No, N/A)    Equipment safety straps used. na (Yes, No, N/A)    Assist with toileting. na (Yes, No, N/A)

## 2021-06-01 ENCOUNTER — TELEPHONE (OUTPATIENT)
Dept: FAMILY MEDICINE | Facility: CLINIC | Age: 81
End: 2021-06-01

## 2021-06-01 ENCOUNTER — HOSPITAL ENCOUNTER (EMERGENCY)
Facility: CLINIC | Age: 81
Discharge: HOME | End: 2021-06-01
Attending: NURSE PRACTITIONER | Admitting: NURSE PRACTITIONER
Payer: COMMERCIAL

## 2021-06-01 ENCOUNTER — APPOINTMENT (OUTPATIENT)
Dept: GENERAL RADIOLOGY | Facility: CLINIC | Age: 81
End: 2021-06-01
Attending: NURSE PRACTITIONER
Payer: COMMERCIAL

## 2021-06-01 VITALS
TEMPERATURE: 97.9 F | BODY MASS INDEX: 31.19 KG/M2 | DIASTOLIC BLOOD PRESSURE: 71 MMHG | SYSTOLIC BLOOD PRESSURE: 143 MMHG | WEIGHT: 230 LBS | HEART RATE: 60 BPM | OXYGEN SATURATION: 99 % | RESPIRATION RATE: 18 BRPM

## 2021-06-01 DIAGNOSIS — R07.89 ATYPICAL CHEST PAIN: ICD-10-CM

## 2021-06-01 PROBLEM — D72.829 LEUKOCYTOSIS: Status: ACTIVE | Noted: 2018-08-01

## 2021-06-01 PROBLEM — F02.80 LATE ONSET ALZHEIMER'S DISEASE WITHOUT BEHAVIORAL DISTURBANCE (H): Status: ACTIVE | Noted: 2021-02-28

## 2021-06-01 PROBLEM — I25.119 CORONARY ARTERY DISEASE INVOLVING NATIVE CORONARY ARTERY OF NATIVE HEART WITH ANGINA PECTORIS (H): Status: ACTIVE | Noted: 2019-09-16

## 2021-06-01 PROBLEM — E11.40 DIABETIC NEUROPATHY (H): Status: ACTIVE | Noted: 2021-06-01

## 2021-06-01 PROBLEM — E11.9 TYPE 2 DIABETES MELLITUS WITHOUT COMPLICATION (H): Status: ACTIVE | Noted: 2021-06-01

## 2021-06-01 PROBLEM — E03.9 HYPOTHYROIDISM: Status: ACTIVE | Noted: 2021-06-01

## 2021-06-01 PROBLEM — G30.1 LATE ONSET ALZHEIMER'S DISEASE WITHOUT BEHAVIORAL DISTURBANCE (H): Status: ACTIVE | Noted: 2021-02-28

## 2021-06-01 PROBLEM — I48.0 PAROXYSMAL ATRIAL FIBRILLATION (H): Status: ACTIVE | Noted: 2021-06-01

## 2021-06-01 LAB
ANION GAP SERPL CALCULATED.3IONS-SCNC: 6 MMOL/L (ref 3–14)
APTT PPP: 27 SEC (ref 22–37)
BASOPHILS # BLD AUTO: 0.1 10E9/L (ref 0–0.2)
BASOPHILS NFR BLD AUTO: 0.3 %
BUN SERPL-MCNC: 13 MG/DL (ref 7–30)
CALCIUM SERPL-MCNC: 8.9 MG/DL (ref 8.5–10.1)
CHLORIDE SERPL-SCNC: 103 MMOL/L (ref 94–109)
CO2 SERPL-SCNC: 26 MMOL/L (ref 20–32)
CREAT SERPL-MCNC: 0.97 MG/DL (ref 0.66–1.25)
D DIMER PPP FEU-MCNC: 0.4 UG/ML FEU (ref 0–0.5)
DIFFERENTIAL METHOD BLD: ABNORMAL
EOSINOPHIL # BLD AUTO: 0.2 10E9/L (ref 0–0.7)
EOSINOPHIL NFR BLD AUTO: 0.8 %
ERYTHROCYTE [DISTWIDTH] IN BLOOD BY AUTOMATED COUNT: 13 % (ref 10–15)
GFR SERPL CREATININE-BSD FRML MDRD: 73 ML/MIN/{1.73_M2}
GLUCOSE SERPL-MCNC: 98 MG/DL (ref 70–99)
HCT VFR BLD AUTO: 42.3 % (ref 40–53)
HGB BLD-MCNC: 14.1 G/DL (ref 13.3–17.7)
IMM GRANULOCYTES # BLD: 0.1 10E9/L (ref 0–0.4)
IMM GRANULOCYTES NFR BLD: 0.5 %
INR PPP: 1.07 (ref 0.86–1.14)
LYMPHOCYTES # BLD AUTO: 8.2 10E9/L (ref 0.8–5.3)
LYMPHOCYTES NFR BLD AUTO: 42.1 %
MCH RBC QN AUTO: 30.6 PG (ref 26.5–33)
MCHC RBC AUTO-ENTMCNC: 33.3 G/DL (ref 31.5–36.5)
MCV RBC AUTO: 92 FL (ref 78–100)
MONOCYTES # BLD AUTO: 1.2 10E9/L (ref 0–1.3)
MONOCYTES NFR BLD AUTO: 6.1 %
NEUTROPHILS # BLD AUTO: 9.8 10E9/L (ref 1.6–8.3)
NEUTROPHILS NFR BLD AUTO: 50.2 %
NRBC # BLD AUTO: 0 10*3/UL
NRBC BLD AUTO-RTO: 0 /100
PLATELET # BLD AUTO: 178 10E9/L (ref 150–450)
PLATELET # BLD EST: ABNORMAL 10*3/UL
POTASSIUM SERPL-SCNC: 3.8 MMOL/L (ref 3.4–5.3)
RBC # BLD AUTO: 4.61 10E12/L (ref 4.4–5.9)
RBC MORPH BLD: NORMAL
SODIUM SERPL-SCNC: 135 MMOL/L (ref 133–144)
TROPONIN I SERPL-MCNC: 0.02 UG/L (ref 0–0.04)
TROPONIN I SERPL-MCNC: 0.04 UG/L (ref 0–0.04)
VARIANT LYMPHS BLD QL SMEAR: PRESENT
WBC # BLD AUTO: 19.6 10E9/L (ref 4–11)

## 2021-06-01 PROCEDURE — 71046 X-RAY EXAM CHEST 2 VIEWS: CPT

## 2021-06-01 PROCEDURE — 93010 ELECTROCARDIOGRAM REPORT: CPT | Performed by: FAMILY MEDICINE

## 2021-06-01 PROCEDURE — 84484 ASSAY OF TROPONIN QUANT: CPT | Performed by: EMERGENCY MEDICINE

## 2021-06-01 PROCEDURE — 84484 ASSAY OF TROPONIN QUANT: CPT | Performed by: NURSE PRACTITIONER

## 2021-06-01 PROCEDURE — 85730 THROMBOPLASTIN TIME PARTIAL: CPT | Performed by: EMERGENCY MEDICINE

## 2021-06-01 PROCEDURE — 80048 BASIC METABOLIC PNL TOTAL CA: CPT | Performed by: EMERGENCY MEDICINE

## 2021-06-01 PROCEDURE — 99285 EMERGENCY DEPT VISIT HI MDM: CPT | Mod: 25 | Performed by: FAMILY MEDICINE

## 2021-06-01 PROCEDURE — 85025 COMPLETE CBC W/AUTO DIFF WBC: CPT | Performed by: EMERGENCY MEDICINE

## 2021-06-01 PROCEDURE — 85379 FIBRIN DEGRADATION QUANT: CPT | Performed by: EMERGENCY MEDICINE

## 2021-06-01 PROCEDURE — 84484 ASSAY OF TROPONIN QUANT: CPT | Mod: 91 | Performed by: NURSE PRACTITIONER

## 2021-06-01 PROCEDURE — 93005 ELECTROCARDIOGRAM TRACING: CPT | Performed by: FAMILY MEDICINE

## 2021-06-01 PROCEDURE — 85610 PROTHROMBIN TIME: CPT | Performed by: EMERGENCY MEDICINE

## 2021-06-01 ASSESSMENT — ENCOUNTER SYMPTOMS
SPEECH DIFFICULTY: 0
DIZZINESS: 0
ABDOMINAL PAIN: 0
LIGHT-HEADEDNESS: 0
COUGH: 0
WEAKNESS: 0
FEVER: 0
FATIGUE: 1
NUMBNESS: 1
VOMITING: 0
SHORTNESS OF BREATH: 0
CONSTIPATION: 0
MUSCULOSKELETAL NEGATIVE: 1
DIARRHEA: 0
NAUSEA: 1
APPETITE CHANGE: 0
CHILLS: 0
HEADACHES: 0

## 2021-06-01 NOTE — DISCHARGE INSTRUCTIONS
We have scheduled a Lexiscan stress test for Thursday 6/3/2021 at 1pm, [facility].  ---NO caffeine, NO alcohol, NO tobacco  ---Hold Metoprolol Thursday morning.  Make appointment for recheck with [facility] ([facility]) as soon as possible for recheck (for Friday or Monday).  Return to the emergency department for fevers, worsening pain, shortness of breath, vomiting, or any new symptoms of concern.

## 2021-06-01 NOTE — ED NOTES
Called to get patient schedule for NUC STRESS TEST. 1pm on Thursday, June 3rd. Gave information to provider and RN for discharge instructions for patient.

## 2021-06-01 NOTE — ED PROVIDER NOTES
"  History     Chief Complaint   Patient presents with     Chest Pain     HPI   History limited due to patient's dementia.    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 Patient reports chest pain that started this morning shortly after he woke up this morning. EMS gave Aspirin and nitroglycerin spray x 2.  He also received nitroglycerin x 2 by his wife. Lessened at this time, \"hardly there\". Patient tells me the chest pain is across his anterior chest, both sided and points to over his sternum.  Tells me he has had frequent episodes of chest pain for the last few weeks. He states pain is not every day, but it is happens with exertion and he feel the need to sit down and rest when the pain is present. Dull ache. Not sharp or pleuritic. Intermittent nausea, but no vomiting.   Denies shortness, fevers, cough or congestion. Nonsmoker.    Patient with a history for chronically elevated white blood cell count which is felt secondary to agent orange exposure in [place] during the war.    Per conversation over the phone with patient's wife-- confirms patient has had chest pain intermittently for the last 2 weeks. He was evaluated here on 5/18 and 5/19 with similar symptoms with negative work-up and discharged home. There was suspicion for GERD and he did have an esophagram on 5/26/2021 (see interpretation below). Last episode of chest pain was last week.  Today he started to have chest pain after he was up moving around (exertional chest pain).    Esophagram 5/26/2021:   IMPRESSION:    1. No intrinsic or extrinsic filling defects are seen in the esophagus  to suggest mass.  2. Significant dysmotility with significant escape from the primary  stripping wave which is not cleared by secondary stripping waves.  3. 1.3 " cm diameter barium pill passed easily into the stomach.  4. The distal esophagus was mildly patulous during swallows of water  in the upright position.     FRANKO KHAN MD    Echo bubble study with contrast 2/11/2021:  Interpretation Summary  Technically difficult study.     Global and regional left ventricular function is normal with an EF of 55-60%.  The right ventricle is normal size.  Global right ventricular function is normal.  Bubble study appear negative though technically difficult.  Mild mitral annular calcification is present.  No significant valvular abnormalities were noted by Doppler. Limited  visualization of valves.  No pericardial effusion is present.  Previous study not available for comparison.    Allergies:  Allergies   Allergen Reactions     No Known Allergies        Problem List:    Patient Active Problem List    Diagnosis Date Noted     Diabetic neuropathy (H) 06/01/2021     Priority: Medium     Type 2 diabetes mellitus without complication (H) 06/01/2021     Priority: Medium     Hypothyroidism 06/01/2021     Priority: Medium     Paroxysmal atrial fibrillation (H) 06/01/2021     Priority: Medium     Status post reverse total replacement of right shoulder 03/27/2021     Priority: Medium     Late onset Alzheimer's disease without behavioral disturbance (H) 02/28/2021     Priority: Medium     Vertebral artery occlusion 02/11/2021     Priority: Medium     Coronary artery disease involving native coronary artery of native heart with angina pectoris (H) 09/16/2019     Priority: Medium     Formatting of this note might be different from the original.  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  Pers. Hx of exposure to agent orange       Hypercholesteremia 04/06/2011     Priority: Medium     Essential hypertension  07/19/2005     Priority: Medium     Formatting of this note might be different from the original.  Borderline  IMO Update          Past Medical History:    No past medical history on file.    Past Surgical History:    No past surgical history on file.    Family History:    No family history on file.    Social History:  Marital Status:   [2]  Social History     Tobacco Use     Smoking status: Former Smoker     Smokeless tobacco: Never Used   Substance Use Topics     Alcohol use: Not Currently     Frequency: Never     Drug use: Not Currently        Medications:    acetaminophen (TYLENOL) 500 MG tablet  alum & mag hydroxide-simethicone (MAALOX ADVANCED MAX ST) 400-400-40 MG/5ML SUSP suspension  aspirin (ASA) 81 MG EC tablet  atorvastatin (LIPITOR) 40 MG tablet  blood glucose (ACCU-CHEK MAGGIE PLUS) test strip  blood glucose monitoring (SOFTCLIX) lancets  clopidogrel (PLAVIX) 75 MG tablet  famotidine (PEPCID) 20 MG tablet  gabapentin (NEURONTIN) 300 MG capsule  insulin glargine (LANTUS SOLOSTAR) 100 UNIT/ML pen  insulin pen needle (31G X 8 MM) 31G X 8 MM miscellaneous  levothyroxine (SYNTHROID/LEVOTHROID) 50 MCG tablet  lisinopril (ZESTRIL) 5 MG tablet  metFORMIN (GLUCOPHAGE-XR) 500 MG 24 hr tablet  metoprolol tartrate (LOPRESSOR) 25 MG tablet  nitroGLYcerin (NITROSTAT) 0.4 MG sublingual tablet  ORDER FOR DME  pantoprazole (PROTONIX) 40 MG EC tablet  Psyllium (NATURAL FIBER THERAPY) 48.57 % POWD  senna (SENOKOT) 8.6 MG tablet  traMADol (ULTRAM) 50 MG tablet  vitamin B-12 (CYANOCOBALAMIN) 1000 MCG tablet          Review of Systems   Constitutional: Positive for fatigue. Negative for appetite change, chills and fever.   HENT: Negative for congestion.    Respiratory: Negative for cough and shortness of breath.    Cardiovascular: Positive for chest pain.   Gastrointestinal: Positive for nausea. Negative for abdominal pain, constipation, diarrhea and vomiting.   Genitourinary: Negative.    Musculoskeletal: Negative.   "  Skin: Negative.    Neurological: Positive for numbness (chronic LE due to neuropathy). Negative for dizziness, speech difficulty, weakness, light-headedness and headaches.   All other systems reviewed and are negative.      Physical Exam   BP: (!) 143/71  Pulse: 60  Temp: 97.9  F (36.6  C)  Resp: 18  Weight: 104.3 kg (230 lb)  SpO2: 99 %      Physical Exam  Constitutional:       General: He is not in acute distress.     Appearance: Normal appearance. He is not ill-appearing.   HENT:      Nose: Nose normal.      Mouth/Throat:      Pharynx: Oropharynx is clear. No oropharyngeal exudate.   Eyes:      General: No scleral icterus.     Conjunctiva/sclera: Conjunctivae normal.   Cardiovascular:      Rate and Rhythm: Normal rate.      Comments: Paced rythm  Pulmonary:      Effort: Pulmonary effort is normal. No respiratory distress.      Breath sounds: Normal breath sounds. No wheezing or rales.   Chest:       Abdominal:      General: There is no distension.      Palpations: Abdomen is soft.      Tenderness: There is no abdominal tenderness.   Skin:     General: Skin is warm and dry.   Neurological:      General: No focal deficit present.      Mental Status: He is alert. Mental status is at baseline.      Comments: History of alzheimer's dementia         ED Course        Procedures               EKG Interpretation:      Interpreted by LELA Khan CNP  Time reviewed: 1205  Symptoms at time of EKG: chest pain, \"barely there\"  Rhythm: paced  Rate: paced, rate 60  Comparison to prior: Unchanged from 05/19/2021    Clinical Impression: paced rhythm at 60 bpm.         Results for orders placed or performed during the hospital encounter of 06/01/21 (from the past 24 hour(s))   CBC with platelets differential   Result Value Ref Range    WBC 19.6 (H) 4.0 - 11.0 10e9/L    RBC Count 4.61 4.4 - 5.9 10e12/L    Hemoglobin 14.1 13.3 - 17.7 g/dL    Hematocrit 42.3 40.0 - 53.0 %    MCV 92 78 - 100 fl    MCH 30.6 26.5 - 33.0 " pg    MCHC 33.3 31.5 - 36.5 g/dL    RDW 13.0 10.0 - 15.0 %    Platelet Count 178 150 - 450 10e9/L    Diff Method Automated Method     % Neutrophils 50.2 %    % Lymphocytes 42.1 %    % Monocytes 6.1 %    % Eosinophils 0.8 %    % Basophils 0.3 %    % Immature Granulocytes 0.5 %    Nucleated RBCs 0 0 /100    Absolute Neutrophil 9.8 (H) 1.6 - 8.3 10e9/L    Absolute Lymphocytes 8.2 (H) 0.8 - 5.3 10e9/L    Absolute Monocytes 1.2 0.0 - 1.3 10e9/L    Absolute Eosinophils 0.2 0.0 - 0.7 10e9/L    Absolute Basophils 0.1 0.0 - 0.2 10e9/L    Abs Immature Granulocytes 0.1 0 - 0.4 10e9/L    Absolute Nucleated RBC 0.0     Reactive Lymphs Present     RBC Morphology Normal     Platelet Estimate       Automated count confirmed.  Platelet morphology is normal.   Basic metabolic panel   Result Value Ref Range    Sodium 135 133 - 144 mmol/L    Potassium 3.8 3.4 - 5.3 mmol/L    Chloride 103 94 - 109 mmol/L    Carbon Dioxide 26 20 - 32 mmol/L    Anion Gap 6 3 - 14 mmol/L    Glucose 98 70 - 99 mg/dL    Urea Nitrogen 13 7 - 30 mg/dL    Creatinine 0.97 0.66 - 1.25 mg/dL    GFR Estimate 73 >60 mL/min/[1.73_m2]    GFR Estimate If Black 85 >60 mL/min/[1.73_m2]    Calcium 8.9 8.5 - 10.1 mg/dL   Troponin I   Result Value Ref Range    Troponin I ES 0.017 0.000 - 0.045 ug/L   D dimer quantitative   Result Value Ref Range    D Dimer 0.4 0.0 - 0.50 ug/ml FEU   INR   Result Value Ref Range    INR 1.07 0.86 - 1.14   Partial thromboplastin time   Result Value Ref Range    PTT 27 22 - 37 sec   XR Chest 2 Views    Narrative    XR CHEST 2 VW 6/1/2021 1:05 PM    HISTORY: chest pain    COMPARISON: Chest x-ray 5/19/2021      Impression    IMPRESSION: Stable dual lead left-sided cardiac conduction device.  Right shoulder arthroplasty. Low lung volumes. Minimal left basilar  atelectasis versus scarring. The right lung is clear. No pleural  effusion. Normal heart size.    AMY P CANDI, MD   Troponin I (second draw)   Result Value Ref Range    Troponin I ES  0.037 0.000 - 0.045 ug/L     1208- at bedside. Patient was sleeping when entering room.   1246- patient gave permission to call wife (Rossana). I spoke with Rossana via phone to get more history.  1342- at bedside. NAD. Discussed labs and imaging are unremarkable. Discussed possible admission for chest pain rule out. Likely needs stress echo/lexiscan. Patient wants to be admitted to the hospital, and expresses his concern for this ongoing intermittent chest pain and is worried. This would likely observation status admission, but seems reasonable given his age, morbidity and 3rd visit for chest pain. [facility]. He states he is ok [facility] or [facility]. He prefers to stay here to be near his family.    1400-[facility] was contacted and received [facility] to admit patient here if needed.  1423: I spoke with the on-call hospitalist, Dr. Levy, to discuss admission. If patient's 2nd Troponin is negative,  Dr. Levy does not see any indication for admission/observation and recommends discharge with plan for outpatient Lexiscan to be done on Thursday.   1550: 2nd Troponin 0.037 (still within normal range), but slight change from 0.017.   I consulted with Dr. Mckinnon, emergency physician, regarding this patient's work-up.  1600: wife is at the bedside. I discussed the lab and imaging findings. We discussed the hosptialist recommendation for discharge and Lexiscan on Thursday.  Patient is free of chest pain at this time. Wife in agreement to bring patient home and will return for Thursday's appointment.  They were provided instructions for Lexiscan and instructed as follows:  We have scheduled a Lexiscan stress test for Thursday 6/3/2021 at 1pm, [facility] here.  ---NO caffeine, NO alcohol, NO tobacco  ---Hold Metoprolol Thursday morning.  Make appointment for recheck with [facility] ([facility]) as soon as possible for recheck (for Friday or Monday).  Return to the emergency  department for fevers, worsening pain, shortness of breath, vomiting, or any new symptoms of concern.    Medications - No data to display    Assessments & Plan (with Medical Decision Making)     I have reviewed the nursing notes.    I have reviewed the findings, diagnosis, plan and need for follow up with the patient.      Discharge Medication List as of 6/1/2021  4:15 PM          Final diagnoses:   Atypical chest pain       6/1/2021   [facility] EMERGENCY DEPT     Mechelle, LELA Sutton CNP  06/02/21 1028

## 2021-06-01 NOTE — TELEPHONE ENCOUNTER
"Priority call taken, wife on the phone, patient in the background, she says he has been worked up for chest pain/GERD in the past, nothing found.   He woke this AM with chest pain and leg pain, says he is not sure he can walk.   He reports he is short of breath (he is in the background answering questions).   She says he is also now having one sided face pain.    Due to multiple \"red flag\" issues: chest pain with shortness of breath, leg pain/unable to walk, and one sided face pain (stroke issue?) I deferred taking the time to triage all issues and advised she call 911 to have him evaluated for alarming symptoms.    Patient's wife verbalized understanding of and agreement with plan.  I offered to call 911 on their behalf, she declined, states she will call.    Vida Sanchez RN  [facility]        "

## 2021-06-01 NOTE — ED NOTES
CARLA Camilo  [facility] Re admission from [facility]   [id number]  [facility] -[phone] approval code to stay here or be transferred to where admission is available.

## 2021-06-03 ENCOUNTER — HOSPITAL ENCOUNTER (OUTPATIENT)
Dept: CARDIOLOGY | Facility: CLINIC | Age: 81
End: 2021-06-03
Attending: NURSE PRACTITIONER
Payer: COMMERCIAL

## 2021-06-03 ENCOUNTER — HOSPITAL ENCOUNTER (OUTPATIENT)
Dept: NUCLEAR MEDICINE | Facility: CLINIC | Age: 81
Setting detail: NUCLEAR MEDICINE
End: 2021-06-03
Attending: NURSE PRACTITIONER
Payer: COMMERCIAL

## 2021-06-03 DIAGNOSIS — R07.89 ATYPICAL CHEST PAIN: ICD-10-CM

## 2021-06-03 LAB
CV STRESS MAX HR HE: 74
RATE PRESSURE PRODUCT: NORMAL
STRESS ECHO BASELINE DIASTOLIC HE: 77
STRESS ECHO BASELINE HR: 62
STRESS ECHO BASELINE SYSTOLIC BP: 168
STRESS ECHO CALCULATED PERCENT HR: 53 %
STRESS ECHO LAST STRESS DIASTOLIC BP: 78
STRESS ECHO LAST STRESS SYSTOLIC BP: 144
STRESS ECHO TARGET HR: 140

## 2021-06-03 PROCEDURE — A9502 TC99M TETROFOSMIN: HCPCS | Performed by: NURSE PRACTITIONER

## 2021-06-03 PROCEDURE — 93017 CV STRESS TEST TRACING ONLY: CPT | Performed by: REHABILITATION PRACTITIONER

## 2021-06-03 PROCEDURE — 78452 HT MUSCLE IMAGE SPECT MULT: CPT

## 2021-06-03 PROCEDURE — 93018 CV STRESS TEST I&R ONLY: CPT | Performed by: INTERNAL MEDICINE

## 2021-06-03 PROCEDURE — 93016 CV STRESS TEST SUPVJ ONLY: CPT | Performed by: INTERNAL MEDICINE

## 2021-06-03 PROCEDURE — 343N000001 HC RX 343: Performed by: NURSE PRACTITIONER

## 2021-06-03 PROCEDURE — 250N000011 HC RX IP 250 OP 636: Performed by: NURSE PRACTITIONER

## 2021-06-03 PROCEDURE — 78452 HT MUSCLE IMAGE SPECT MULT: CPT | Mod: 26 | Performed by: INTERNAL MEDICINE

## 2021-06-03 RX ORDER — REGADENOSON 0.08 MG/ML
0.4 INJECTION, SOLUTION INTRAVENOUS ONCE
Status: COMPLETED | OUTPATIENT
Start: 2021-06-03 | End: 2021-06-03

## 2021-06-03 RX ADMIN — TETROFOSMIN 10.6 MCI.: 1.38 INJECTION, POWDER, LYOPHILIZED, FOR SOLUTION INTRAVENOUS at 10:10

## 2021-06-03 RX ADMIN — TETROFOSMIN 30.9 MCI.: 1.38 INJECTION, POWDER, LYOPHILIZED, FOR SOLUTION INTRAVENOUS at 11:50

## 2021-06-03 RX ADMIN — REGADENOSON 0.4 MG: 0.08 INJECTION, SOLUTION INTRAVENOUS at 11:35

## 2021-10-27 ENCOUNTER — OFFICE VISIT (OUTPATIENT)
Dept: PODIATRY | Facility: CLINIC | Age: 81
End: 2021-10-27
Payer: COMMERCIAL

## 2021-10-27 VITALS
SYSTOLIC BLOOD PRESSURE: 148 MMHG | HEIGHT: 72 IN | WEIGHT: 235 LBS | DIASTOLIC BLOOD PRESSURE: 70 MMHG | BODY MASS INDEX: 31.83 KG/M2

## 2021-10-27 DIAGNOSIS — E11.9 TYPE 2 DIABETES MELLITUS WITHOUT COMPLICATION, WITHOUT LONG-TERM CURRENT USE OF INSULIN (H): Primary | ICD-10-CM

## 2021-10-27 PROCEDURE — 99203 OFFICE O/P NEW LOW 30 MIN: CPT | Performed by: PODIATRIST

## 2021-10-27 ASSESSMENT — MIFFLIN-ST. JEOR: SCORE: 1813.95

## 2021-10-27 ASSESSMENT — PAIN SCALES - GENERAL: PAINLEVEL: NO PAIN (0)

## 2021-10-27 NOTE — PATIENT INSTRUCTIONS
"Nail Debridement    A high quality instrument makes trimming toenails MUCH easier.  Search [employer] for any 5\" nail nipper manufactured by reliable brands such as Miltex, Integra or Jarit as these quality instruments will help manage difficult nails more effectively and comfortably. We use Miltex -SS.  A physician is not necessary to trim nails even if you are taking blood thinners or are diabetic.  Your family or care givers may help manage your toenails.      Trim or sand the nails once weekly.  Do not wait until they are long and painful or trimming will become too difficult and painful and will increase your risk of complications or infection.  A course file or 120 grit sandpaper on a sanding block can be helpful.  For very thick nails many people prefer battery operated barahona such as an Amope', Personal Pedi and Emjoi for regular use or heavy painful callouses or thick toenails.    Trim or skive any portion of nail that is thick, loose, crumbling, or not well attached. Do not tear the nail away, but rather cut them with a nail nipperor sand or sand them down.  You may follow up with your Podiatric Physician if you have pain, bleeding, infection, questions or other concerns.      You may also contact the following Registered Nurses for further help with nail debridement and minor hygiene concnerns.  They may come to your home or meet them at their clinic to trim your toenails and soak your feet, as well as monitor for any complications that would require evaluation by a Physician.      [facility]  Opal Nassar RN  Phone & text [phone]    [facility]  Yandy Padilla RN  Office [phone]    [facility]  Crystal Lynn RN  [phone]   Call or text for appointment  Some home visits and has a clinic at:  [address] [place] [id number]    [facility]  John Driver RN  [phone]    [facility]  [phone]  [place], [place], [place]    [facility]  [phone]  [email or web address]    For up to date list and to find foot care nurses in other communities visit American Foot Care Nurses Association website:  afcna.org.     Calluses, Corns, IPKs, Porokeratosis    When there is excessive friction or pressure on the skin, the body responds by making the skin thicker.  While this may protect the deeper structures, the thickened skin can take up more space and thus increase pressure over a bony prominence or become an open sore or skin ulcer as this skin becomes less flexible.    Flat, diffuse thickening are simple calluses and they are usually caused by friction.  Often these are the result of rubbing on a shoe or going barefoot.    Calluses with a central core between the toes are called corns.  These often result from prominent joints on adjacent toes rubbing together.  Theses are often a symptom of bone malalignment and will usually recur unless the underlying bones are addressed.    Many of these lesions can be kept comfortable with routine maintenance. This consists of filing them with a Ped Egg, callus file, or 120 grit sandpaper on a block, every day during your bath or shower.  Most people prefer battery operated barahona such as an Amope', Personal Pedi and Emjoi for regular use or heavy painful callouses.  Heavy creams or ointments can be applied 1-2 times every day to keep them soft. Toe spacers can be used for corns, gel pads can be used for other lesions on the bottom of the foot. If there is a deformity noted, such as a prominent bone, often this can be addressed to minimize recurrence. However, sometimes the pressure and lesion simply migrates to another spot after surgery, so it is not a guaranteed cure.     If you have severe callouses and cracking, you may apply heavy ointments that you scoop up such as Cetaphil cream, Eucerin, Aquaphor or  Vaseline.  Be sure to obtain cream or ointment in these brands and not lotion (lotion is water based and not durable enough for feet). For more aggressive help apply heavy creams or ointment under occlusive dressings such as Saran Wrap or Jelly Feet while sleeping.   Jelly Feet can be obtained at [email or web address].     To be successful with managing hyperkeratotic skin, you must manage hygiene daily.  Apply the cream once or twice EVERY day.  At your bath or shower time is the easiest time to work on this when skin is most soft.  There is no medical or surgical treatment that will absolutely eliminate many of these symptoms.      Pedifix is a reliable source for all sorts of foot pads, cushions, or interdigital spacers and foot appliances. Go to www.[employer] or request a catalog at 1-005Shoefitr.        Please call with any additional questions.         DIABETES AND YOUR FEET    What effect does diabetes have on the feet?  Diabetes can result in several problems in the feet including contractures of the tendons leading to deformities and reduced function of the bones, skin ulcers or open sores on pressure points or prominent deformities, reduced sensation, reduced blood flow and thus reduced oxygen and immune cells to the tiny vessels in our feet. This all leads to higher risk of hospitalization, infections, and amputations.     What is neuropathy?  Neuropathy is a term used to describe a loss of nerve function.  Patients with diabetes are at risk of developing neuropathy if their sugars continue to run high and are above the normal value of 140.  The elevated blood sugar in the body enters the nerves causing it to swell and impair nerve function.  The higher the blood sugar and the longer it is elevated, the more damage is done to nerves.  This damage is permanent and irreversible.  These damaged sensory nerves can then cause reduced feeling or cause pain.  Damaged motor nerves can reduce blood flow and white  "blood cells into into your foot, skin and bones reducing your ability to heal a small problem. And neuropathy can cause tendons to become unbalanced and contribute to the formation of deformity and contractures in our feet. Often times, neuropathy can be prevented by controlling your blood sugar.  Your risk of developing neuropathy goes up dramatically as your hemoglobin A1C raises above 7.5.      How do I know if I have neuropathy?  When a person develops neuropathy, they usually begin to feel numbness or tingling in their feet and sometimes in their legs.  Other symptoms may include painful burning or hot feet, tingling, electrical sensations or feeling like insects or ants are crawling on your feet or legs.  If blood sugar remains above 140  for long periods of time, neuropathy can also occur in the hands.  When a person loses their \"protective threshold\" or ability to detect a 5.07 [place] Jenny monofilament is when they have elevated risk for developing foot deformity, contractures, foot infections, amputations, Charcot arthropathy, or other complications. Keep your hemoglobin A1C below 7.5 to reduce this risk.    What is vascular disease?  Peripheral vascular disease is a term used to describe a loss or decrease in circulation (blood flow).  There is a problem in getting blood, immune cells, and oxygen to areas that need it.  Similar to neuropathy, sugars can build up in the walls of the arteries (blood vessels) and cause them to become swollen, thickened and hardened.  This decreases the amount of blood that can go to an area that needs it.  Though this is common in the legs of diabetic patients, it can also affect other arteries (blood vessels) in the body such as in the heart, kidney, eyes, and the blood flow into bones.  It is often seen first in the small vessels of her body notably our feet and toes.    How do I know if I have vascular disease?  In the legs, vascular disease usually results in " cramping.  Patients who develop leg cramps after walking the same distance every time (i.e. One block, half a mile, ect.) need to let their doctors know so that their circulation may be checked.  Cramps causing severe pain in the feet and/or legs while sleeping and the cramps go away when you stand or hang your legs off the side of the bed, may also be a sign of poor blood circulation.  Occasional cramping in cold weather or on rare occasions with activity may not be due to poor circulation, but you should inform your doctor.    How can these problems be prevented?  The key to prevention is good blood sugar control all day every day.  Inadequate blood sugar control is the most common way patients experience these problems. Reducing, controlling and measuring your daily consumption of sugar or carbohydrates is essential to understanding and managing diabetes.  Physical activity (exercise) is a very good way to help decrease your blood sugars.  Exercise can lower your blood sugar, blood pressure, and cholesterol.  It also reduces your risk for heart disease and stroke, relieves stress, and strengthens your heart, muscles and bones. Physical activity also increases your balance and reduces development of contractures and foot deformities over time. In addition, regular activity helps insulin work better, improves your blood circulation, and keeps your joints flexible.  If you're trying to lose weight, a combination of exercise and wise food choices can help you reach your target weight and maintain it.  Activity and exercise alone can not make up for poor diet choices, eating too much, or eating too many sugars or carbohydrates.  Ask your doctor for help when you are not meeting your blood sugar goals. Changes or increases in medication are powerful tools in reducing your blood sugar.    Know your blood sugar and hemoglobin A1C trend.  Upon first diagnosis or during acute illness, checking your blood sugar 4 times a  day can help you understand how your diet, activity, and lifestyle affect your blood sugar.  Monitoring your hemoglobin A1C can help you understand how well you are managing blood sugar over the long run.  Your hemoglobin A1C tells you what your blood sugar averages all day, every day, over the past 90 days.       To experience the lowest risk of complications associated with diabetes such as neuropathy, loss of blood flow, bone or joint infection, charcot arthropathy, or amputation, the American Diabetes Association recommends a target hemoglobin A1C of less than 7.0%, while the American Association of Clinical Endocrinologists' recommendation is 6.5% or less.  Both organizations advise that the goals be individualized based on patient factors such as other health conditions, history of hypoglycemia, education, and life expectancy.  A patients risk of experiencing complications associated with diabetes is only slightly elevated with a hemoglobin A1C above 6.0.  However, this risk goes up exponentially when the hemoglobin A1C is above 7.5.  The longer the hemoglobin A1C is elevated, the more risk that patient will experience in their lifetime. The damage that occurs to nerves, blood vessels, tendons, bones and body organs, while their hemoglobin A1C is elevated is mostly irreversible and worsens with each additional time period of elevated hemoglobin A1C.     You must understand and manage your disease.  Your health insurance or medical team cannot manage this disease for you.  When you take responsibility for understanding and managing your disease, you can expect to experience fewer problems associated with diabetes in your lifetime.  You will  Also experience a higher quality of life and health and reduced cost of health care.    Diabetic Foot Care Recommendations  The following are recommendations for avoiding serious foot problems or injury    DO'S  1. Be aware of your hemoglobin A1C and continue to follow up  with your medical team for adjustments in your lifestyle and medication until your reach your A1C goal.  Keep this below 7.5 to reduce your risk of developing complications associated with diabetes.    2.  Wash your feet with lukewarm water and a mild soap and then dry them thoroughly, especially between the toes.  Gently floss your towel or washcloth between each toe at every bath.  Soaking your feet in water cannot clean dead skin, debris, and bacteria from your feet and is not necessary.   3. Examine your feet daily looking for cuts, corns, blisters, cracks, ect..., especially after wearing new shoes or increased or changed activities.  Make sure to look between your toes.  If you cannot see the bottom of your feet, set a mirror on the floor and hold your foot over it, or ask a family member to examine your feet for you daily.  Contact your doctor immediately if new problems are noted or if sores are not healing.  4.  Immediately apply moisturizer cream such as Cetaphil to the tops and bottoms of your feet, avoiding areas between the toes.  Apply sunscreen or cover your feet if they will be exposed to extended sunlight.  5.Use clean comfortable shoes.  Socks should not have thick seams or cut off the circulation around the leg.  Break in new shoes slowly and rotate with older shoes until broken in.  Check the inside of your shoes with your hand to look for areas of irritation or objects that may have fallen into your shoes.    6. Keep slippers by the side of your bed for use during the night.  7. Shoes should be fitted by a professional and should not cause areas of irritation.  Check your feet regularly when wearing a new pair of shoes and replace them as needed.  8.  Talk to your doctor about proper exercise.  Exercise and stretching stimulate blood flow to your feet and maintain proper glucose levels.  Use it or lose it!  9.  Monitor your blood glucose level and your hemoglobin A1C.  Notify your doctor  "immediately if your blood sugar is abnormally high or low.  10.  Cut your nails straight across, but then gently round any sharp edges with a nail file.  If you have neuropathy, peripheral vascular disease or cannot see that well to trim your own toenails, see a medical professional for care.    DONT'S  1.  Do not soak your feet if you have an open sore or your provider has informed you that you have neuropathy or loss of protective threshold.  Use only lukewarm water and always check the temperature with your hand as hot water can easily burn your feet.    2.  Never use a hot water bottle or heating pad on your feet.  Also do not apply hot or cold compresses to your feet.  With decreased sensation, you could burn or freeze your feet.  Do not rest your feet near a heat source such as a heater or heat register.    3.  Do not apply any of these to your feet:    - over the counter medicine for corns or warts    -  Harsh chemicals like boric acid    -  Do not self-treat corns, cuts, blisters or infections.  Always consult your doctor.   4.  Do not wear sandals, slippers or walk barefoot, especially on harsh surfaces.  5.  If you smoke, stop!!!        Nail Debridement    A high quality instrument makes trimming toenails MUCH easier.  Search [employer] for any 5\" nail nipper manufactured by reliable brands such as Miltex, Integra or Jarit as these quality instruments will help manage difficult nails more effectively and comfortably. We use Miltex -SS.  A physician is not necessary to trim nails even if you are taking blood thinners or are diabetic.  Your family or care givers may help manage your toenails.      Trim or sand the nails once weekly.  Do not wait until they are long and painful or trimming will become too difficult and painful and will increase your risk of complications or infection.  A course file or 120 grit sandpaper on a sanding block can be helpful.  For very thick nails many people prefer battery " operated barahona such as an Amope', Personal Pedi and Emjoi for regular use or heavy painful callouses or thick toenails.    Trim or skive any portion of nail that is thick, loose, crumbling, or not well attached. Do not tear the nail away, but rather cut them with a nail nipperor sand or sand them down.  You may follow up with your Podiatric Physician if you have pain, bleeding, infection, questions or other concerns.      You may also contact the following Registered Nurses for further help with nail debridement and minor hygiene concnerns.  They may come to your home or meet them at their clinic to trim your toenails and soak your feet, as well as monitor for any complications that would require evaluation by a Physician.      [facility]  Opal Nassar RN  Phone & text [phone]    [facility]  Yandy Padilla RN  Office [phone]    [facility]  Crystal Lynn RN  [phone]   Call or text for appointment  Some home visits and has a clinic at:  [address] [place] [address]    [facility]  John Driver RN  [phone]    [facility]  [phone]  [place], [place], [place]    [facility]  [phone]  [email or web address]    For up to date list and to find foot care nurses in other communities visit American Foot Care Nurses Association website:  afcna.org.     Calluses, Corns, IPKs, Porokeratosis    When there is excessive friction or pressure on the skin, the body responds by making the skin thicker.  While this may protect the deeper structures, the thickened skin can take up more space and thus increase pressure over a bony prominence or become an open sore or skin ulcer as this skin becomes less flexible.    Flat, diffuse thickening are simple calluses and they are usually caused by friction.  Often these are the result of rubbing on a shoe or going  barefoot.    Calluses with a central core between the toes are called corns.  These often result from prominent joints on adjacent toes rubbing together.  Theses are often a symptom of bone malalignment and will usually recur unless the underlying bones are addressed.    Many of these lesions can be kept comfortable with routine maintenance. This consists of filing them with a Ped Egg, callus file, or 120 grit sandpaper on a block, every day during your bath or shower.  Most people prefer battery operated barahona such as an [employer]', Personal Pedi and Emjoi for regular use or heavy painful callouses.  Heavy creams or ointments can be applied 1-2 times every day to keep them soft. Toe spacers can be used for corns, gel pads can be used for other lesions on the bottom of the foot. If there is a deformity noted, such as a prominent bone, often this can be addressed to minimize recurrence. However, sometimes the pressure and lesion simply migrates to another spot after surgery, so it is not a guaranteed cure.     If you have severe callouses and cracking, you may apply heavy ointments that you scoop up such as Cetaphil cream, Eucerin, Aquaphor or Vaseline.  Be sure to obtain cream or ointment in these brands and not lotion (lotion is water based and not durable enough for feet). For more aggressive help apply heavy creams or ointment under occlusive dressings such as Saran Wrap or Jelly Feet while sleeping.   Jelly Feet can be obtained at [email or web address].     To be successful with managing hyperkeratotic skin, you must manage hygiene daily.  Apply the cream once or twice EVERY day.  At your bath or shower time is the easiest time to work on this when skin is most soft.  There is no medical or surgical treatment that will absolutely eliminate many of these symptoms.      Pedifix is a reliable source for all sorts of foot pads, cushions, or interdigital spacers and foot appliances. Go to [email or web address] or request a  catalog at 0-478-PEDIFIX.        Please call with any additional questions.

## 2021-10-27 NOTE — LETTER
10/27/2021         RE: Brannon Boggs  [address] [facility] [address]        Dear Colleague,    Thank you for referring your patient, Brannon Boggs, to the [facility]. Please see a copy of my visit note below.    HPI:  Brannon Boggs is a 81 year old male who is seen in [facility]    Pt presents for eval of:   (Onset, Location, L/R, Character, Treatments, Injury if yes)    DM type 2     DM type 2 exam w/neuropathy and DFC. Presents today with his wife.    Retired. Served in [employer], [place]    Review of Systems:  Patient denies fever, chills, rash, wound, stiffness, limping, numbness, weakness, heart burn, blood in stool, chest pain with activity, calf pain when walking, shortness of breath with activity, chronic cough, easy bleeding/bruising, swelling of ankles, excessive thirst, fatigue, depression, anxiety.  Patient admits only to symptoms noted in history.     Patient Active Problem List   Diagnosis     Vertebral artery occlusion     Status post reverse total replacement of right shoulder     Essential hypertension     Diabetic neuropathy (H)     Type 2 diabetes mellitus without complication (H)     Coronary artery disease involving native coronary artery of native heart with angina pectoris (H)     Agent Orange poisoning     Hypothyroidism     Hypercholesteremia     Late onset Alzheimer's disease without behavioral disturbance (H)     Leukocytosis     Paroxysmal atrial fibrillation (H)     PAST MEDICAL HISTORY: History reviewed. No pertinent past medical history.  PAST SURGICAL HISTORY: History reviewed. No pertinent surgical history.  MEDICATIONS:   Current Outpatient Medications:      aspirin (ASA) 81 MG EC tablet, Take 81 mg by mouth daily, Disp: , Rfl:      atorvastatin (LIPITOR) 40 MG tablet, Take 40 mg by mouth daily, Disp: , Rfl:      clopidogrel (PLAVIX) 75 MG tablet, Take 75 mg by mouth daily, Disp: , Rfl:      famotidine  (PEPCID) 20 MG tablet, Take 2 tablets (40 mg) by mouth At Bedtime, Disp: , Rfl:      gabapentin (NEURONTIN) 300 MG capsule, Take 300 mg by mouth 2 times daily Morning and bedtime, Disp: , Rfl:      insulin glargine (LANTUS SOLOSTAR) 100 UNIT/ML pen, Inject 32 Units Subcutaneous every morning , Disp: , Rfl:      levothyroxine (SYNTHROID/LEVOTHROID) 50 MCG tablet, Take 50 mcg by mouth daily before breakfast, Disp: , Rfl:      lisinopril (ZESTRIL) 5 MG tablet, Take 2.5 mg by mouth daily, Disp: , Rfl:      metFORMIN (GLUCOPHAGE-XR) 500 MG 24 hr tablet, Take 500 mg by mouth 2 times daily (with meals), Disp: , Rfl:      metoprolol tartrate (LOPRESSOR) 25 MG tablet, Take 12.5 mg by mouth 2 times daily, Disp: , Rfl:      pantoprazole (PROTONIX) 40 MG EC tablet, Take 40 mg by mouth daily 1/2 hour before eating, Disp: , Rfl:      senna (SENOKOT) 8.6 MG tablet, Take 17.2 mg by mouth At Bedtime, Disp: , Rfl:      vitamin B-12 (CYANOCOBALAMIN) 1000 MCG tablet, Take 1,000 mcg by mouth daily, Disp: , Rfl:      acetaminophen (TYLENOL) 500 MG tablet, Take 1,000 mg by mouth every 6 hours as needed for pain, Disp: , Rfl:      alum & mag hydroxide-simethicone (MAALOX ADVANCED MAX ST) 400-400-40 MG/5ML SUSP suspension, Take 30 mLs by mouth every 4 hours as needed for indigestion or heartburn, Disp:  , Rfl: 0     blood glucose (ACCU-CHEK MAGGIE PLUS) test strip, 1 strip by In Vitro route daily, Disp: , Rfl:      blood glucose monitoring (SOFTCLIX) lancets, 1 each by In Vitro route daily, Disp: , Rfl:      insulin pen needle (31G X 8 MM) 31G X 8 MM miscellaneous, Inject 1 each Subcutaneous daily For lantus, Disp: , Rfl:      nitroGLYcerin (NITROSTAT) 0.4 MG sublingual tablet, Place 0.4 mg under the tongue every 5 minutes as needed for chest pain, Disp: , Rfl:      ORDER FOR DME, Equipment being ordered: Walker, Disp: 1 each, Rfl: 0     Psyllium (NATURAL FIBER THERAPY) 48.57 % POWD, Take 3 tsp by mouth daily, Disp: , Rfl:      traMADol  (ULTRAM) 50 MG tablet, Take 50 mg by mouth, Disp: , Rfl:   ALLERGIES:    Allergies   Allergen Reactions     No Known Allergies      SOCIAL HISTORY:   Social History     Socioeconomic History     Marital status:      Spouse name: Not on file     Number of children: Not on file     Years of education: Not on file     Highest education level: Not on file   Occupational History     Not on file   Tobacco Use     Smoking status: Former Smoker     Smokeless tobacco: Never Used   Substance and Sexual Activity     Alcohol use: Not Currently     Drug use: Not Currently     Sexual activity: Not on file   Other Topics Concern     Not on file   Social History Narrative     Not on file     Social Determinants of Health     Financial Resource Strain:      Difficulty of Paying Living Expenses:    Food Insecurity:      Worried About Running Out of Food in the Last Year:      Ran Out of Food in the Last Year:    Transportation Needs:      Lack of Transportation (Medical):      Lack of Transportation (Non-Medical):    Physical Activity:      Days of Exercise per Week:      Minutes of Exercise per Session:    Stress:      Feeling of Stress :    Social Connections:      Frequency of Communication with Friends and Family:      Frequency of Social Gatherings with Friends and Family:      Attends Gnosticism Services:      Active Member of Clubs or Organizations:      Attends Club or Organization Meetings:      Marital Status:    Intimate Partner Violence:      Fear of Current or Ex-Partner:      Emotionally Abused:      Physically Abused:      Sexually Abused:      FAMILY HISTORY: History reviewed. No pertinent family history.     EXAM:Vitals: BP (!) 148/70 (BP Location: Left arm, Patient Position: Sitting, Cuff Size: Adult Large)   Ht 1.829 m (6')   Wt 106.6 kg (235 lb)   BMI 31.87 kg/m    BMI= Body mass index is 31.87 kg/m .    General appearance: Patient is alert and fully cooperative with history & exam.  No sign of distress  is noted during the visit.     Psychiatric: Affect is pleasant & appropriate.  Patient appears motivated to improve health.     Respiratory: Breathing is regular & unlabored while sitting.     HEENT: Hearing is intact to spoken word.  Speech is clear.  No gross evidence of visual impairment that would impact ambulation.     Vascular: DP 1/4 & PT 1/4 left & right.  CFT delayed with dependent rubor noted about the digits.  Diminished hair growth distal to mid tibia and no hair about the foot and toes.  Temperature changes noted, warm to cool proximal to distal.  Hemosiderin pigmentation noted with multiple varicosities legs and feet bilateral. Generalized edema bilateral legs and feet.  Pt denies claudication history.     Neurologic: Normal plantar response bilateral.  Managed protective threshold plus 5/10 applications of a 5.07 monofilament.  Pt admits burning and paraesthesias about the feet and toes with palpation.     Dermatologic: All 10 nails are thickened, but an otherwise appropriate repair.  Diminished texture turgor and tone about the integument.  Skin is thin & shiny.  No Pre ulcerative hyperkeratosis noted.  No abscess or full thickness ulcerations noted.     Musculoskeletal: Patient is ambulatory without assistive device or brace.  There is semi reducible contracture of the lesser digits.    Hemoglobin A1C (%)   Date Value   02/12/2021 7.4 (H)     Creatinine (mg/dL)   Date Value   06/01/2021 0.97   05/19/2021 0.99   05/18/2021 0.83   03/15/2021 0.90   02/13/2021 0.88   02/12/2021 0.83     ASSESSMENT:       ICD-10-CM    1. Type 2 diabetes mellitus without complication, without long-term current use of insulin (H)  E11.9         PLAN:    10/27/2021  Diabetic foot exam in a  We discussed risk factors and preventive measures.    We discussed appropriate hygiene, shoe gear, daily foot exam, and reinforced management of weight, diet, activity goals and HA1C goal for diabetic patients.    Nails are in good  repair today as [facility]    Dispensed written foot care instructions.    Last diabetic shoe gear dispensed 2/21.  He would qualify for new extra-depth diabetic shoes 2/22.  All questions were answered to their satisfaction.    RTC once yearly and as needed with questions or concerns.      Jesse Yarbrough DPM            Again, thank you for allowing me to participate in the care of your patient.        Sincerely,        Jesse Yarbrough DPM

## 2021-10-27 NOTE — PROGRESS NOTES
HPI:  Brannon Boggs is a 81 year old male who is seen in consultation at [facility] request [facility]    Pt presents for eval of:   (Onset, Location, L/R, Character, Treatments, Injury if yes)    DM type 2     DM type 2 exam w/neuropathy and DFC. Presents today with his wife.    Retired. [place] [employer]    Review of Systems:  Patient denies fever, chills, rash, wound, stiffness, limping, numbness, weakness, heart burn, blood in stool, chest pain with activity, calf pain when walking, shortness of breath with activity, chronic cough, easy bleeding/bruising, swelling of ankles, excessive thirst, fatigue, depression, anxiety.  Patient admits only to symptoms noted in history.     Patient Active Problem List   Diagnosis     Vertebral artery occlusion     Status post reverse total replacement of right shoulder     Essential hypertension     Diabetic neuropathy (H)     Type 2 diabetes mellitus without complication (H)     Coronary artery disease involving native coronary artery of native heart with angina pectoris (H)     Agent Orange poisoning     Hypothyroidism     Hypercholesteremia     Late onset Alzheimer's disease without behavioral disturbance (H)     Leukocytosis     Paroxysmal atrial fibrillation (H)     PAST MEDICAL HISTORY: History reviewed. No pertinent past medical history.  PAST SURGICAL HISTORY: History reviewed. No pertinent surgical history.  MEDICATIONS:   Current Outpatient Medications:      aspirin (ASA) 81 MG EC tablet, Take 81 mg by mouth daily, Disp: , Rfl:      atorvastatin (LIPITOR) 40 MG tablet, Take 40 mg by mouth daily, Disp: , Rfl:      clopidogrel (PLAVIX) 75 MG tablet, Take 75 mg by mouth daily, Disp: , Rfl:      famotidine (PEPCID) 20 MG tablet, Take 2 tablets (40 mg) by mouth At Bedtime, Disp: , Rfl:      gabapentin (NEURONTIN) 300 MG capsule, Take 300 mg by mouth 2 times daily Morning and bedtime, Disp: , Rfl:      insulin glargine (LANTUS SOLOSTAR) 100 UNIT/ML pen,  Inject 32 Units Subcutaneous every morning , Disp: , Rfl:      levothyroxine (SYNTHROID/LEVOTHROID) 50 MCG tablet, Take 50 mcg by mouth daily before breakfast, Disp: , Rfl:      lisinopril (ZESTRIL) 5 MG tablet, Take 2.5 mg by mouth daily, Disp: , Rfl:      metFORMIN (GLUCOPHAGE-XR) 500 MG 24 hr tablet, Take 500 mg by mouth 2 times daily (with meals), Disp: , Rfl:      metoprolol tartrate (LOPRESSOR) 25 MG tablet, Take 12.5 mg by mouth 2 times daily, Disp: , Rfl:      pantoprazole (PROTONIX) 40 MG EC tablet, Take 40 mg by mouth daily 1/2 hour before eating, Disp: , Rfl:      senna (SENOKOT) 8.6 MG tablet, Take 17.2 mg by mouth At Bedtime, Disp: , Rfl:      vitamin B-12 (CYANOCOBALAMIN) 1000 MCG tablet, Take 1,000 mcg by mouth daily, Disp: , Rfl:      acetaminophen (TYLENOL) 500 MG tablet, Take 1,000 mg by mouth every 6 hours as needed for pain, Disp: , Rfl:      alum & mag hydroxide-simethicone (MAALOX ADVANCED MAX ST) 400-400-40 MG/5ML SUSP suspension, Take 30 mLs by mouth every 4 hours as needed for indigestion or heartburn, Disp:  , Rfl: 0     blood glucose (ACCU-CHEK MAGGIE PLUS) test strip, 1 strip by In Vitro route daily, Disp: , Rfl:      blood glucose monitoring (SOFTCLIX) lancets, 1 each by In Vitro route daily, Disp: , Rfl:      insulin pen needle (31G X 8 MM) 31G X 8 MM miscellaneous, Inject 1 each Subcutaneous daily For lantus, Disp: , Rfl:      nitroGLYcerin (NITROSTAT) 0.4 MG sublingual tablet, Place 0.4 mg under the tongue every 5 minutes as needed for chest pain, Disp: , Rfl:      ORDER FOR DME, Equipment being ordered: Walker, Disp: 1 each, Rfl: 0     Psyllium (NATURAL FIBER THERAPY) 48.57 % POWD, Take 3 tsp by mouth daily, Disp: , Rfl:      traMADol (ULTRAM) 50 MG tablet, Take 50 mg by mouth, Disp: , Rfl:   ALLERGIES:    Allergies   Allergen Reactions     No Known Allergies      SOCIAL HISTORY:   Social History     Socioeconomic History     Marital status:      Spouse name: Not on file      Number of children: Not on file     Years of education: Not on file     Highest education level: Not on file   Occupational History     Not on file   Tobacco Use     Smoking status: Former Smoker     Smokeless tobacco: Never Used   Substance and Sexual Activity     Alcohol use: Not Currently     Drug use: Not Currently     Sexual activity: Not on file   Other Topics Concern     Not on file   Social History Narrative     Not on file     Social Determinants of Health     Financial Resource Strain:      Difficulty of Paying Living Expenses:    Food Insecurity:      Worried About Running Out of Food in the Last Year:      Ran Out of Food in the Last Year:    Transportation Needs:      Lack of Transportation (Medical):      Lack of Transportation (Non-Medical):    Physical Activity:      Days of Exercise per Week:      Minutes of Exercise per Session:    Stress:      Feeling of Stress :    Social Connections:      Frequency of Communication with Friends and Family:      Frequency of Social Gatherings with Friends and Family:      Attends Shinto Services:      Active Member of Clubs or Organizations:      Attends Club or Organization Meetings:      Marital Status:    Intimate Partner Violence:      Fear of Current or Ex-Partner:      Emotionally Abused:      Physically Abused:      Sexually Abused:      FAMILY HISTORY: History reviewed. No pertinent family history.     EXAM:Vitals: BP (!) 148/70 (BP Location: Left arm, Patient Position: Sitting, Cuff Size: Adult Large)   Ht 1.829 m (6')   Wt 106.6 kg (235 lb)   BMI 31.87 kg/m    BMI= Body mass index is 31.87 kg/m .    General appearance: Patient is alert and fully cooperative with history & exam.  No sign of distress is noted during the visit.     Psychiatric: Affect is pleasant & appropriate.  Patient appears motivated to improve health.     Respiratory: Breathing is regular & unlabored while sitting.     HEENT: Hearing is intact to spoken word.  Speech is clear.   No gross evidence of visual impairment that would impact ambulation.     Vascular: DP 1/4 & PT 1/4 left & right.  CFT delayed with dependent rubor noted about the digits.  Diminished hair growth distal to mid tibia and no hair about the foot and toes.  Temperature changes noted, warm to cool proximal to distal.  Hemosiderin pigmentation noted with multiple varicosities legs and feet bilateral. Generalized edema bilateral legs and feet.  Pt denies claudication history.     Neurologic: Normal plantar response bilateral.  Managed protective threshold plus 5/10 applications of a 5.07 monofilament.  Pt admits burning and paraesthesias about the feet and toes with palpation.     Dermatologic: All 10 nails are thickened, but an otherwise appropriate repair.  Diminished texture turgor and tone about the integument.  Skin is thin & shiny.  No Pre ulcerative hyperkeratosis noted.  No abscess or full thickness ulcerations noted.     Musculoskeletal: Patient is ambulatory without assistive device or brace.  There is semi reducible contracture of the lesser digits.    Hemoglobin A1C (%)   Date Value   02/12/2021 7.4 (H)     Creatinine (mg/dL)   Date Value   06/01/2021 0.97   05/19/2021 0.99   05/18/2021 0.83   03/15/2021 0.90   02/13/2021 0.88   02/12/2021 0.83     ASSESSMENT:       ICD-10-CM    1. Type 2 diabetes mellitus without complication, without long-term current use of insulin (H)  E11.9         PLAN:    10/27/2021  Diabetic foot exam in a  We discussed risk factors and preventive measures.    We discussed appropriate hygiene, shoe gear, daily foot exam, and reinforced management of weight, diet, activity goals and HA1C goal for diabetic patients.    Nails are in good repair today as [facility]    Dispensed written foot care instructions.    Last diabetic shoe gear dispensed 2/21.  He would qualify for new extra-depth diabetic shoes 2/22.  All questions were answered to their satisfaction.     RTC once yearly and as needed with questions or concerns.      Jesse Yarbrough DPM

## 2022-05-30 ENCOUNTER — APPOINTMENT (OUTPATIENT)
Dept: GENERAL RADIOLOGY | Facility: CLINIC | Age: 82
End: 2022-05-30
Attending: EMERGENCY MEDICINE
Payer: COMMERCIAL

## 2022-05-30 ENCOUNTER — HOSPITAL ENCOUNTER (EMERGENCY)
Facility: CLINIC | Age: 82
Discharge: HOME | End: 2022-05-30
Attending: EMERGENCY MEDICINE | Admitting: EMERGENCY MEDICINE
Payer: COMMERCIAL

## 2022-05-30 VITALS
TEMPERATURE: 97.8 F | OXYGEN SATURATION: 94 % | WEIGHT: 227 LBS | BODY MASS INDEX: 30.79 KG/M2 | RESPIRATION RATE: 12 BRPM | HEART RATE: 68 BPM | DIASTOLIC BLOOD PRESSURE: 77 MMHG | SYSTOLIC BLOOD PRESSURE: 157 MMHG

## 2022-05-30 DIAGNOSIS — R07.9 CHEST PAIN, UNSPECIFIED TYPE: ICD-10-CM

## 2022-05-30 LAB
ANION GAP SERPL CALCULATED.3IONS-SCNC: 6 MMOL/L (ref 3–14)
BUN SERPL-MCNC: 14 MG/DL (ref 7–30)
CALCIUM SERPL-MCNC: 8.9 MG/DL (ref 8.5–10.1)
CHLORIDE BLD-SCNC: 108 MMOL/L (ref 94–109)
CO2 SERPL-SCNC: 25 MMOL/L (ref 20–32)
CREAT SERPL-MCNC: 0.8 MG/DL (ref 0.66–1.25)
ERYTHROCYTE [DISTWIDTH] IN BLOOD BY AUTOMATED COUNT: 13.7 % (ref 10–15)
GFR SERPL CREATININE-BSD FRML MDRD: 89 ML/MIN/1.73M2
GLUCOSE BLD-MCNC: 120 MG/DL (ref 70–99)
HCT VFR BLD AUTO: 41.8 % (ref 40–53)
HGB BLD-MCNC: 14.1 G/DL (ref 13.3–17.7)
MCH RBC QN AUTO: 30.8 PG (ref 26.5–33)
MCHC RBC AUTO-ENTMCNC: 33.7 G/DL (ref 31.5–36.5)
MCV RBC AUTO: 91 FL (ref 78–100)
PLATELET # BLD AUTO: 162 10E3/UL (ref 150–450)
POTASSIUM BLD-SCNC: 4.1 MMOL/L (ref 3.4–5.3)
RBC # BLD AUTO: 4.58 10E6/UL (ref 4.4–5.9)
SODIUM SERPL-SCNC: 139 MMOL/L (ref 133–144)
TROPONIN I SERPL HS-MCNC: 10 NG/L
TROPONIN I SERPL HS-MCNC: 9 NG/L
WBC # BLD AUTO: 15.9 10E3/UL (ref 4–11)

## 2022-05-30 PROCEDURE — 99285 EMERGENCY DEPT VISIT HI MDM: CPT | Mod: 25 | Performed by: EMERGENCY MEDICINE

## 2022-05-30 PROCEDURE — 71045 X-RAY EXAM CHEST 1 VIEW: CPT

## 2022-05-30 PROCEDURE — 36415 COLL VENOUS BLD VENIPUNCTURE: CPT | Performed by: EMERGENCY MEDICINE

## 2022-05-30 PROCEDURE — 84484 ASSAY OF TROPONIN QUANT: CPT | Performed by: EMERGENCY MEDICINE

## 2022-05-30 PROCEDURE — 82310 ASSAY OF CALCIUM: CPT | Performed by: EMERGENCY MEDICINE

## 2022-05-30 PROCEDURE — 85048 AUTOMATED LEUKOCYTE COUNT: CPT | Performed by: EMERGENCY MEDICINE

## 2022-05-30 PROCEDURE — 85007 BL SMEAR W/DIFF WBC COUNT: CPT | Performed by: EMERGENCY MEDICINE

## 2022-05-30 PROCEDURE — 93005 ELECTROCARDIOGRAM TRACING: CPT | Performed by: EMERGENCY MEDICINE

## 2022-05-30 PROCEDURE — 93010 ELECTROCARDIOGRAM REPORT: CPT | Performed by: EMERGENCY MEDICINE

## 2022-05-30 NOTE — ED PROVIDER NOTES
History     Chief Complaint   Patient presents with     Medication Problem     HPI  Brannon Boggs is a 82 year old male who presents after accidentally brushing his teeth with his lidocaine cream instead of toothpaste.  His face was numb but this has resolved.  During this time this morning he did endorse some chest pain.  Chest pain is gone now.  Due to dementia he has some difficulty with memory but thinks he has a history of heart attack.  The pain did not seem to radiate anywhere.  He did endorse may be some dyspnea with this.  Pain lasted by his best estimate may be 10 minutes.  No recent illness otherwise.    Allergies:  Allergies   Allergen Reactions     No Known Allergies        Problem List:    Patient Active Problem List    Diagnosis Date Noted     Diabetic neuropathy (H) 06/01/2021     Priority: Medium     Type 2 diabetes mellitus without complication (H) 06/01/2021     Priority: Medium     Hypothyroidism 06/01/2021     Priority: Medium     Paroxysmal atrial fibrillation (H) 06/01/2021     Priority: Medium     Status post reverse total replacement of right shoulder 03/27/2021     Priority: Medium     Late onset Alzheimer's disease without behavioral disturbance (H) 02/28/2021     Priority: Medium     Vertebral artery occlusion 02/11/2021     Priority: Medium     Coronary artery disease involving native coronary artery of native heart with angina pectoris (H) 09/16/2019     Priority: Medium     Formatting of this note might be different from the original.  Added automatically from request for surgery [id number]       Leukocytosis 08/01/2018     Priority: Medium     Agent Orange poisoning 04/06/2011     Priority: Medium     Formatting of this note might be different from the original.  Pers. Hx of exposure to agent orange       Hypercholesteremia 04/06/2011     Priority: Medium     Essential hypertension 07/19/2005     Priority: Medium     Formatting of this note might be different from the  original.  Borderline  IMO Update          Past Medical History:    No past medical history on file.    Past Surgical History:    No past surgical history on file.    Family History:    No family history on file.    Social History:  Marital Status:   [2]  Social History     Tobacco Use     Smoking status: Former Smoker     Smokeless tobacco: Never Used   Substance Use Topics     Alcohol use: Not Currently     Drug use: Not Currently        Medications:    aspirin (ASA) 81 MG EC tablet  atorvastatin (LIPITOR) 40 MG tablet  gabapentin (NEURONTIN) 300 MG capsule  insulin glargine (LANTUS PEN) 100 UNIT/ML pen  levothyroxine (SYNTHROID/LEVOTHROID) 50 MCG tablet  metoprolol tartrate (LOPRESSOR) 25 MG tablet  vitamin B-12 (CYANOCOBALAMIN) 1000 MCG tablet  acetaminophen (TYLENOL) 500 MG tablet  alum & mag hydroxide-simethicone (MAALOX ADVANCED MAX ST) 400-400-40 MG/5ML SUSP suspension  blood glucose (ACCU-CHEK MAGGIE PLUS) test strip  blood glucose monitoring (SOFTCLIX) lancets  clopidogrel (PLAVIX) 75 MG tablet  famotidine (PEPCID) 20 MG tablet  insulin pen needle (31G X 8 MM) 31G X 8 MM miscellaneous  lisinopril (ZESTRIL) 5 MG tablet  metFORMIN (GLUCOPHAGE) 500 MG tablet  metFORMIN (GLUCOPHAGE-XR) 500 MG 24 hr tablet  nitroGLYcerin (NITROSTAT) 0.4 MG sublingual tablet  ORDER FOR DME  pantoprazole (PROTONIX) 40 MG EC tablet  Psyllium (NATURAL FIBER THERAPY) 48.57 % POWD  senna (SENOKOT) 8.6 MG tablet  traMADol (ULTRAM) 50 MG tablet          Review of Systems  All other systems are reviewed and are negative    Physical Exam   BP: (!) 167/94  Pulse: 60  Temp: 97.8  F (36.6  C)  Resp: 18  Weight: 103 kg (227 lb)  SpO2: 98 %      Physical Exam  Vitals and nursing note reviewed.   Constitutional:       General: He is not in acute distress.     Appearance: He is well-developed. He is not diaphoretic.   HENT:      Head: Normocephalic and atraumatic.   Eyes:      General: No scleral icterus.        Right eye: No discharge.          Left eye: No discharge.      Conjunctiva/sclera: Conjunctivae normal.   Cardiovascular:      Rate and Rhythm: Normal rate and regular rhythm.      Heart sounds: Normal heart sounds. No murmur heard.  Pulmonary:      Effort: Pulmonary effort is normal. No respiratory distress.      Breath sounds: Normal breath sounds. No stridor.   Abdominal:      Palpations: Abdomen is soft.      Tenderness: There is no abdominal tenderness.   Musculoskeletal:         General: Normal range of motion.      Cervical back: Normal range of motion and neck supple.   Skin:     General: Skin is warm and dry.      Coloration: Skin is not pale.      Findings: No erythema or rash.   Neurological:      Mental Status: He is alert.      Cranial Nerves: No cranial nerve deficit.      Motor: No abnormal muscle tone.         ED Course                 Procedures         EKG consistent with a paced rhythm at 60 bpm.  Wide-complex.  No further interpretation.  Consistent with previous EKGs.  Interpreted by myself.    Critical Care time:  none               Results for orders placed or performed during the hospital encounter of 05/30/22 (from the past 24 hour(s))   CBC with platelets differential    Narrative    The following orders were created for panel order CBC with platelets differential.  Procedure                               Abnormality         Status                     ---------                               -----------         ------                     CBC with platelets and d...[[id number]]  Abnormal            Final result               Manual Differential[[id number]]          Abnormal            Preliminary result           Please view results for these tests on the individual orders.   Basic metabolic panel   Result Value Ref Range    Sodium 139 133 - 144 mmol/L    Potassium 4.1 3.4 - 5.3 mmol/L    Chloride 108 94 - 109 mmol/L    Carbon Dioxide (CO2) 25 20 - 32 mmol/L    Anion Gap 6 3 - 14 mmol/L    Urea Nitrogen 14 7 - 30 mg/dL     Creatinine 0.80 0.66 - 1.25 mg/dL    Calcium 8.9 8.5 - 10.1 mg/dL    Glucose 120 (H) 70 - 99 mg/dL    GFR Estimate 89 >60 mL/min/1.73m2   Troponin I   Result Value Ref Range    Troponin I High Sensitivity 10 <79 ng/L   CBC with platelets and differential   Result Value Ref Range    WBC Count 15.9 (H) 4.0 - 11.0 10e3/uL    RBC Count 4.58 4.40 - 5.90 10e6/uL    Hemoglobin 14.1 13.3 - 17.7 g/dL    Hematocrit 41.8 40.0 - 53.0 %    MCV 91 78 - 100 fL    MCH 30.8 26.5 - 33.0 pg    MCHC 33.7 31.5 - 36.5 g/dL    RDW 13.7 10.0 - 15.0 %    Platelet Count 162 150 - 450 10e3/uL   Manual Differential   Result Value Ref Range    % Neutrophils 27 %    % Lymphocytes 69 %    % Monocytes 3 %    % Eosinophils 0 %    % Basophils 1 %    Absolute Neutrophils 4.3 1.6 - 8.3 10e3/uL    Absolute Lymphocytes 11.0 (H) 0.8 - 5.3 10e3/uL    Absolute Monocytes 0.5 0.0 - 1.3 10e3/uL    Absolute Eosinophils 0.0 0.0 - 0.7 10e3/uL    Absolute Basophils 0.2 0.0 - 0.2 10e3/uL    RBC Morphology Confirmed RBC Indices     Platelet Assessment  Automated Count Confirmed. Platelet morphology is normal.     Automated Count Confirmed. Platelet morphology is normal.    Reactive Lymphocytes Present (A) None Seen    Smudge Cells Present (A) None Seen    Pathologist Review Comments      Narrative    Sent for Review by Pathologist. Review comments will be entered. Results will be updated after review as applicable.     XR Chest Port 1 View    Narrative    EXAM: XR CHEST PORT 1 VIEW  LOCATION: [facility]  DATE/TIME: 5/30/2022 10:30 AM    INDICATION: Chest pain.    COMPARISON: 6/1/2021.      Impression    IMPRESSION: Left-sided pacer device. Coronary stents are noted. The lungs are clear. No pneumothorax or pleural effusion.     Troponin I   Result Value Ref Range    Troponin I High Sensitivity 9 <79 ng/L       Medications - No data to display    Assessments & Plan (with Medical Decision Making)  82-year-old male who presented after  accidentally brushing his teeth with lidocaine cream.  Numbness has subsided.  He did endorse having some chest pain earlier this morning.  Due to dementia clear history is difficult to obtain.  However he is stable here without chest pain.  Serial troponins are negative and he appears stable for discharge home     I have reviewed the nursing notes.    I have reviewed the findings, diagnosis, plan and need for follow up with the patient.       New Prescriptions    No medications on file       Final diagnoses:   Chest pain, unspecified type       5/30/2022   [facility] EMERGENCY DEPT     Deo Guzmán MD  05/30/22 [id number]

## 2022-05-30 NOTE — ED NOTES
Pt up to ambulate to restroom - tolerated well. VS assessed. Pt states pain in chest is not any worse.

## 2022-05-30 NOTE — ED TRIAGE NOTES
Pt presents by EMS with concerns of numbness and chest pain.  Pt accidentally brushed his teeth with 4% lidocaine topical cream.  Pt having numbness in the face, hands, and feet.  Pt states that he also had some chest pain.

## 2022-05-31 LAB
BASOPHILS # BLD MANUAL: 0.2 10E3/UL (ref 0–0.2)
BASOPHILS NFR BLD MANUAL: 1 %
EOSINOPHIL # BLD MANUAL: 0 10E3/UL (ref 0–0.7)
EOSINOPHIL NFR BLD MANUAL: 0 %
LYMPHOCYTES # BLD MANUAL: 11 10E3/UL (ref 0.8–5.3)
LYMPHOCYTES NFR BLD MANUAL: 69 %
MONOCYTES # BLD MANUAL: 0.5 10E3/UL (ref 0–1.3)
MONOCYTES NFR BLD MANUAL: 3 %
NEUTROPHILS # BLD MANUAL: 4.3 10E3/UL (ref 1.6–8.3)
NEUTROPHILS NFR BLD MANUAL: 27 %
PATH REV: ABNORMAL
PLAT MORPH BLD: ABNORMAL
RBC MORPH BLD: ABNORMAL
SMUDGE CELLS BLD QL SMEAR: PRESENT
VARIANT LYMPHS BLD QL SMEAR: PRESENT

## 2022-10-13 ENCOUNTER — OFFICE VISIT (OUTPATIENT)
Dept: FAMILY MEDICINE | Facility: CLINIC | Age: 82
End: 2022-10-13
Payer: COMMERCIAL

## 2022-10-13 VITALS
HEART RATE: 64 BPM | TEMPERATURE: 97.7 F | RESPIRATION RATE: 20 BRPM | DIASTOLIC BLOOD PRESSURE: 74 MMHG | SYSTOLIC BLOOD PRESSURE: 160 MMHG

## 2022-10-13 DIAGNOSIS — F03.90 DEMENTIA, UNSPECIFIED DEMENTIA SEVERITY, UNSPECIFIED DEMENTIA TYPE, UNSPECIFIED WHETHER BEHAVIORAL, PSYCHOTIC, OR MOOD DISTURBANCE OR ANXIETY (H): ICD-10-CM

## 2022-10-13 DIAGNOSIS — R61 SWEATY SKIN: ICD-10-CM

## 2022-10-13 DIAGNOSIS — Z95.0 CARDIAC PACEMAKER IN SITU: ICD-10-CM

## 2022-10-13 DIAGNOSIS — I10 BENIGN ESSENTIAL HYPERTENSION: ICD-10-CM

## 2022-10-13 DIAGNOSIS — E11.9 TYPE 2 DIABETES MELLITUS WITHOUT COMPLICATION, WITH LONG-TERM CURRENT USE OF INSULIN (H): Primary | ICD-10-CM

## 2022-10-13 DIAGNOSIS — R25.1 SHAKINESS: ICD-10-CM

## 2022-10-13 DIAGNOSIS — Z79.4 TYPE 2 DIABETES MELLITUS WITHOUT COMPLICATION, WITH LONG-TERM CURRENT USE OF INSULIN (H): Primary | ICD-10-CM

## 2022-10-13 LAB — HBA1C MFR BLD: 6.8 % (ref 0–5.6)

## 2022-10-13 PROCEDURE — 93000 ELECTROCARDIOGRAM COMPLETE: CPT | Performed by: NURSE PRACTITIONER

## 2022-10-13 PROCEDURE — 36415 COLL VENOUS BLD VENIPUNCTURE: CPT | Performed by: NURSE PRACTITIONER

## 2022-10-13 PROCEDURE — 82947 ASSAY GLUCOSE BLOOD QUANT: CPT | Performed by: NURSE PRACTITIONER

## 2022-10-13 PROCEDURE — 83036 HEMOGLOBIN GLYCOSYLATED A1C: CPT | Performed by: NURSE PRACTITIONER

## 2022-10-13 PROCEDURE — 99204 OFFICE O/P NEW MOD 45 MIN: CPT | Performed by: NURSE PRACTITIONER

## 2022-10-13 ASSESSMENT — PAIN SCALES - GENERAL: PAINLEVEL: NO PAIN (0)

## 2022-10-13 NOTE — PROGRESS NOTES
Assessment & Plan     Type 2 diabetes mellitus without complication, with long-term current use of insulin (H)    - EKG 12-lead complete w/read - Clinics  - Glucose; Future  - Hemoglobin A1c; Future  - Hemoglobin A1c  - Glucose    Shakiness    - EKG 12-lead complete w/read - Clinics  - Glucose; Future  - Hemoglobin A1c; Future  - Hemoglobin A1c  - Glucose    Sweaty skin    - EKG 12-lead complete w/read - Clinics  - Glucose; Future  - Hemoglobin A1c; Future  - Hemoglobin A1c  - Glucose    Dementia, unspecified dementia severity, unspecified dementia type, unspecified whether behavioral, psychotic, or mood disturbance or anxiety (H)      Benign essential hypertension      Cardiac pacemaker in situ    30 minutes spent on the date of the encounter doing chart review, history and exam, documentation and further activities per the note     BMI:   Estimated body mass index is 30.79 kg/m  as calculated from the following:    Height as of 10/27/21: 1.829 m (6').    Weight as of 5/30/22: 103 kg (227 lb).   Weight management plan: Discussed healthy diet and exercise guidelines    See Patient Instructions: Read after visit summary, follow-up if symptoms persist or worsen.  Advised him to follow-up with his primary care doctor.  Patient and wife in agreement.    Return in about 4 weeks (around 11/10/2022), or if symptoms worsen or fail to improve, for follow up with primary doctor.    CHIP JENSEN, KAYLA  [facility] is a 82 year old, presenting for the following health issues:  Walk In Clinic    Patient brought down to primary care, he has a history of diabetes, was upstairs with his wife for an appointment and he became shaky and sweaty.  He was given food and his symptoms have improved.  He reports he did not have time to eat this morning.  His wife reports he did eat cereal and toast this morning.  She reports he has a history of dementia.  He denies chest pain.  EKG  obtained, he does have a pacemaker-100% paced.  Compared EKG to previous and it appears similar to previous EKG.  Blood pressure is elevated.  Blood sugar and A1c labs before he leaves-glucose now 120, A1c 6.8.  He is no longer shaky or sweaty.    HPI       Review of Systems   Constitutional, HEENT, cardiovascular, pulmonary, GI, , musculoskeletal, neuro, skin, endocrine and psych systems are negative, except as otherwise noted.      Objective    BP (!) 160/74   Pulse 64   Temp 97.7  F (36.5  C) (Tympanic)   Resp 20   There is no height or weight on file to calculate BMI.  Physical Exam   GENERAL: healthy, alert and no distress  EYES: Eyes grossly normal to inspection.  No discharge or erythema, or obvious scleral/conjunctival abnormalities.   RESP: lungs clear to auscultation - no rales, rhonchi or wheezes  CV: regular rate and rhythm, normal S1 S2, no S3 or S4, no murmur, click or rub, no peripheral edema and peripheral pulses strong  SKIN: Visible skin clear. No significant rash, abnormal pigmentation or lesions.  NEURO: Cranial nerves grossly intact.  Mentation and speech appropriate for age.  PSYCH: Mentation appears normal, affect normal/bright, judgement and insight intact, normal speech and appearance well-groomed.    EKG - Reviewed and interpreted by me: 100 % paced, consistent with previous EKG  A1C 6.8, glucose 120 after eating

## 2022-10-14 LAB
FASTING STATUS PATIENT QL REPORTED: NO
GLUCOSE BLD-MCNC: 199 MG/DL (ref 70–99)

## 2022-10-17 ENCOUNTER — TRANSCRIBE ORDERS (OUTPATIENT)
Dept: OTHER | Age: 82
End: 2022-10-17

## 2022-10-17 DIAGNOSIS — M25.511 RIGHT SHOULDER PAIN: Primary | ICD-10-CM

## 2022-10-28 ENCOUNTER — HOSPITAL ENCOUNTER (OUTPATIENT)
Dept: PHYSICAL THERAPY | Facility: CLINIC | Age: 82
Setting detail: THERAPIES SERIES
Discharge: HOME | End: 2022-10-28
Attending: INTERNAL MEDICINE
Payer: COMMERCIAL

## 2022-10-28 DIAGNOSIS — M25.511 RIGHT SHOULDER PAIN: ICD-10-CM

## 2022-10-28 PROCEDURE — 97110 THERAPEUTIC EXERCISES: CPT | Mod: GP

## 2022-10-28 PROCEDURE — 97162 PT EVAL MOD COMPLEX 30 MIN: CPT | Mod: GP

## 2022-10-28 NOTE — PROGRESS NOTES
"   10/28/22 1043   General Information   Type of Visit Initial OP Ortho PT Evaluation   Start of Care Date 10/28/22   Referring Physician Jorge Luis Mitchell MD   Patient/Family Goals Statement be able to use arm with less pain   Orders Evaluate and Treat   Date of Order 10/17/22   Certification Required? No   Medical Diagnosis Right shoulder pain (M25.511)  - Primary   Surgical/Medical history reviewed Yes   Precautions/Limitations no known precautions/limitations   Weight-Bearing Status - LUE full weight-bearing   Weight-Bearing Status - RUE full weight-bearing   Weight-Bearing Status - LLE full weight-bearing   Weight-Bearing Status - RLE full weight-bearing   General Information Comments PMH: diabetes, osteoarthritis of hip, CAD, late onset Alzheimer s, hypothyroidism, hypercholesteremia, vertebral artery occlusion, hypertension, paroxysmal atrial fibrillation, leukocytosis, agent orange poisoning. PSH: R reverse total shoulder replacement, DIANN       Present No   Body Part(s)   Body Part(s) Shoulder   Presentation and Etiology   Pertinent history of current problem (include personal factors and/or comorbidities that impact the POC) Pt and wife report that he had a R reverse total shoulder in 2017, confirmed by chart review. He did some therapy following replacement but states that it never felt great. Feels like he can't move it very well and it hurts a lot of the time. Heat and tylenol used to manage pain.   Impairments A. Pain;D. Decreased ROM;E. Decreased flexibility;F. Decreased strength and endurance  (reports numbness in B hands)   Functional Limitations perform activities of daily living   Symptom Location R shoulder, spreads up towards the ear   How/Where did it occur   (s/p reverse total shoulder e2017)   Onset date of current episode/exacerbation 10/12/22   Chronicity Chronic   Pain rating (0-10 point scale) Best (/10);Worst (/10)  (currently 6/10)   Best (/10) 4/10   Worst (/10) \"hurts " "like hell\"   Pain quality A. Sharp;C. Aching;F. Stabbing   Frequency of pain/symptoms A. Constant   Pain/symptoms exacerbated by C. Lifting;D. Carrying;H. Overhead reach   Pain/symptoms eased by C. Rest;G. Heat;I. OTC medication(s)   Progression of symptoms since onset: Worsened   Prior Level of Function   Prior Level of Function-Mobility quad cane or 4WW   Prior Level of Function-ADLs asssit from wife as needed   Functional Level Prior Comment Pt has a history of dementia, wife assists when needed   Current Level of Function   Current Community Support Family/friend caregiver   Patient role/employment history F. 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 No   Have you fallen and had an injury in the past year? No   Is patient a fall risk? Department fall risk interventions implemented   Abuse Screen (yes response referral indicated)   Feels Unsafe at Home or Work/School no   Feels Threatened by Someone no   Does Anyone Try to Keep You From Having Contact with Others or Doing Things Outside Your Home? no   Physical Signs of Abuse Present no   Patient needs abuse support services and resources No   Functional Scales   Functional Scales Other   Other Scales  SPADI score 58.46   Shoulder Objective Findings   Side (if bilateral, select both right and left) Right   Cervical Screen (ROM, quadrant) slightly limited in all directions, reports no change to symptoms with head motions   Thoracic Mobility Screen decreased thoracic extension and rotation   Shoulder ROM Comment more PROM compared to AROM, tightness noted with PROM   Pec Minor (supine) Flexibility tightness noted B   Simental-Shant Test painful testing   Palpation no tenderness reported with palpation but decreased muscle mass compared to contralateral side   Observation pt " present with wife for session - needed for accurate history, very pleasant,   Integumentary  no concerns   Posture rounded shoulders, increased thoracic kyphosis, and forward head posture   Right Shoulder Flexion AROM 52 deg w/pain   Right Shoulder Flexion PROM 100 deg, only some tightness   Right Shoulder Abduction AROM 59 deg w/pain   Right Shoulder Abduction PROM 90 deg, slight pain   Right Shoulder ER AROM minimal limited by pain   Right Shoulder ER PROM 40 deg, tightness   Right Shoulder IR AROM able to get hand to side, not behind back at all   Right Shoulder IR PROM to stomach   Right Shoulder Flexion Strength 4-/5   Right Shoulder Abduction Strength 4-/5   Right Shoulder ER Strength 4-/5   Right Shoulder IR Strength 4-/5   Right Shoulder Extension Strength 4-/5   Right Mid Trapezius Strength decreased   Right Lower Trapezius Strength decreased   Planned Therapy Interventions   Planned Therapy Interventions joint mobilization;manual therapy;neuromuscular re-education;ROM;strengthening;stretching   Planned Therapy Interventions Comment as needed   Planned Modality Interventions   Planned Modality Interventions Cryotherapy;Hot packs   Planned Modality Interventions Comments as needed   Clinical Impression   Criteria for Skilled Therapeutic Interventions Met yes, treatment indicated   PT Diagnosis R shoulder pain w/decreased strength, decreased ROM, impaired posture, and decreased activity tolerance   Influenced by the following impairments R shoulder pain w/decreased strength, decreased ROM, impaired posture, and decreased activity tolerance   Functional limitations due to impairments reaching, lifting, carrying, self cares   Clinical Presentation Stable/Uncomplicated   Clinical Presentation Rationale based on history, eval, and clinical reasoning   Clinical Decision Making (Complexity) Moderate complexity   Therapy Frequency 1 time/week   Predicted Duration of Therapy Intervention (days/wks) 4 months   Risk &  Benefits of therapy have been explained Yes   Patient, Family & other staff in agreement with plan of care Yes   Clinical Impression Comments Pt is a pleasant 82 year old male that underwent a reverse total shoulder replacement in 2017. He is now experiencing more pain and is struggling with use of that hand/arm. He presents to OP PT with R shoulder pain w/decreased strength, decreased ROM, impaired posture, and decreased activity tolerance. He will benefit from skilled PT interventions to address these impairments and improve overall function.   Education Assessment   Preferred Learning Style Listening;Reading;Demonstration;Pictures/video   Barriers to Learning Cognitive  (dementia at baseline - wife present to assist with carryover)   ORTHO GOALS   PT Ortho Eval Goals 1;2;3;4   Ortho Goal 1   Goal Identifier HEP   Goal Description Pt will demonstrate accurate compliance to HEP w/assist from wife as needed to independently progress strength and function   Target Date 01/25/23   Ortho Goal 2   Goal Identifier SPADI   Goal Description Pt will improve SPADI score to at least 43% to show a clinically significant improvement in tolerance for daily activities.   Target Date 01/25/23   Ortho Goal 3   Goal Identifier AROM   Goal Description Pt will demonstrate at least 90 deg of shoulder flexion and abduction w/o increased pain to better utilize his dominant arm for daily activities w/o increasing pain   Target Date 01/25/23   Ortho Goal 4   Goal Identifier Posture   Goal Description Pt will demonstrate neutral posture w/decreased foward head and rounded shoulders to allow for better arthrokinimatics at his shoulders to reduce pain   Target Date 01/25/23   Total Evaluation Time   PT Curtis, Moderate Complexity Minutes ([id number]) 20     Monica Weeks, PT, DPT  [phone]  [facility]  Thank you for this referral

## 2022-11-04 ENCOUNTER — HOSPITAL ENCOUNTER (OUTPATIENT)
Dept: PHYSICAL THERAPY | Facility: CLINIC | Age: 82
Setting detail: THERAPIES SERIES
Discharge: HOME | End: 2022-11-04
Attending: INTERNAL MEDICINE
Payer: COMMERCIAL

## 2022-11-04 PROCEDURE — 97110 THERAPEUTIC EXERCISES: CPT | Mod: GP

## 2022-11-04 PROCEDURE — 97140 MANUAL THERAPY 1/> REGIONS: CPT | Mod: GP

## 2022-11-09 ENCOUNTER — HOSPITAL ENCOUNTER (OUTPATIENT)
Dept: PHYSICAL THERAPY | Facility: CLINIC | Age: 82
Setting detail: THERAPIES SERIES
Discharge: HOME | End: 2022-11-09
Attending: INTERNAL MEDICINE
Payer: COMMERCIAL

## 2022-11-09 PROCEDURE — 97110 THERAPEUTIC EXERCISES: CPT | Mod: GP

## 2022-11-22 ENCOUNTER — HOSPITAL ENCOUNTER (OUTPATIENT)
Dept: PHYSICAL THERAPY | Facility: CLINIC | Age: 82
Setting detail: THERAPIES SERIES
Discharge: HOME | End: 2022-11-22
Attending: INTERNAL MEDICINE
Payer: COMMERCIAL

## 2022-11-22 PROCEDURE — 97110 THERAPEUTIC EXERCISES: CPT | Mod: GP

## 2022-11-22 PROCEDURE — 97140 MANUAL THERAPY 1/> REGIONS: CPT | Mod: GP

## 2022-12-06 ENCOUNTER — HOSPITAL ENCOUNTER (OUTPATIENT)
Dept: PHYSICAL THERAPY | Facility: CLINIC | Age: 82
Setting detail: THERAPIES SERIES
Discharge: HOME | End: 2022-12-06
Attending: INTERNAL MEDICINE
Payer: COMMERCIAL

## 2022-12-06 PROCEDURE — 97110 THERAPEUTIC EXERCISES: CPT | Mod: GP

## 2022-12-06 PROCEDURE — 97140 MANUAL THERAPY 1/> REGIONS: CPT | Mod: GP

## 2022-12-13 ENCOUNTER — HOSPITAL ENCOUNTER (OUTPATIENT)
Dept: PHYSICAL THERAPY | Facility: CLINIC | Age: 82
Setting detail: THERAPIES SERIES
Discharge: HOME | End: 2022-12-13
Attending: INTERNAL MEDICINE
Payer: COMMERCIAL

## 2022-12-13 PROCEDURE — 97140 MANUAL THERAPY 1/> REGIONS: CPT | Mod: GP

## 2022-12-13 PROCEDURE — 97110 THERAPEUTIC EXERCISES: CPT | Mod: GP

## 2022-12-16 ENCOUNTER — APPOINTMENT (OUTPATIENT)
Dept: GENERAL RADIOLOGY | Facility: CLINIC | Age: 82
End: 2022-12-16
Attending: EMERGENCY MEDICINE
Payer: COMMERCIAL

## 2022-12-16 ENCOUNTER — HOSPITAL ENCOUNTER (EMERGENCY)
Facility: CLINIC | Age: 82
Discharge: HOME | End: 2022-12-16
Attending: EMERGENCY MEDICINE | Admitting: EMERGENCY MEDICINE
Payer: COMMERCIAL

## 2022-12-16 VITALS
TEMPERATURE: 97.7 F | WEIGHT: 231 LBS | DIASTOLIC BLOOD PRESSURE: 81 MMHG | HEART RATE: 60 BPM | OXYGEN SATURATION: 96 % | BODY MASS INDEX: 31.29 KG/M2 | HEIGHT: 72 IN | SYSTOLIC BLOOD PRESSURE: 164 MMHG | RESPIRATION RATE: 15 BRPM

## 2022-12-16 DIAGNOSIS — R07.89 OTHER CHEST PAIN: ICD-10-CM

## 2022-12-16 LAB
ANION GAP SERPL CALCULATED.3IONS-SCNC: 6 MMOL/L (ref 3–14)
BASOPHILS # BLD AUTO: 0.1 10E3/UL (ref 0–0.2)
BASOPHILS NFR BLD AUTO: 0 %
BUN SERPL-MCNC: 16 MG/DL (ref 7–30)
CALCIUM SERPL-MCNC: 8.8 MG/DL (ref 8.5–10.1)
CHLORIDE BLD-SCNC: 104 MMOL/L (ref 94–109)
CO2 SERPL-SCNC: 25 MMOL/L (ref 20–32)
CREAT SERPL-MCNC: 0.86 MG/DL (ref 0.66–1.25)
EOSINOPHIL # BLD AUTO: 0.2 10E3/UL (ref 0–0.7)
EOSINOPHIL NFR BLD AUTO: 1 %
ERYTHROCYTE [DISTWIDTH] IN BLOOD BY AUTOMATED COUNT: 13.4 % (ref 10–15)
GFR SERPL CREATININE-BSD FRML MDRD: 87 ML/MIN/1.73M2
GLUCOSE BLD-MCNC: 224 MG/DL (ref 70–99)
HCT VFR BLD AUTO: 41.8 % (ref 40–53)
HGB BLD-MCNC: 13.9 G/DL (ref 13.3–17.7)
IMM GRANULOCYTES # BLD: 0 10E3/UL
IMM GRANULOCYTES NFR BLD: 0 %
LYMPHOCYTES # BLD AUTO: 10.8 10E3/UL (ref 0.8–5.3)
LYMPHOCYTES NFR BLD AUTO: 69 %
MCH RBC QN AUTO: 30.5 PG (ref 26.5–33)
MCHC RBC AUTO-ENTMCNC: 33.3 G/DL (ref 31.5–36.5)
MCV RBC AUTO: 92 FL (ref 78–100)
MONOCYTES # BLD AUTO: 0.8 10E3/UL (ref 0–1.3)
MONOCYTES NFR BLD AUTO: 5 %
NEUTROPHILS # BLD AUTO: 4.1 10E3/UL (ref 1.6–8.3)
NEUTROPHILS NFR BLD AUTO: 25 %
NRBC # BLD AUTO: 0 10E3/UL
NRBC BLD AUTO-RTO: 0 /100
PLAT MORPH BLD: ABNORMAL
PLATELET # BLD AUTO: 167 10E3/UL (ref 150–450)
POTASSIUM BLD-SCNC: 4.5 MMOL/L (ref 3.4–5.3)
RBC # BLD AUTO: 4.55 10E6/UL (ref 4.4–5.9)
RBC MORPH BLD: ABNORMAL
SMUDGE CELLS BLD QL SMEAR: PRESENT
SODIUM SERPL-SCNC: 135 MMOL/L (ref 133–144)
TROPONIN I SERPL HS-MCNC: 9 NG/L
TROPONIN I SERPL HS-MCNC: 9 NG/L
VARIANT LYMPHS BLD QL SMEAR: PRESENT
WBC # BLD AUTO: 15.9 10E3/UL (ref 4–11)

## 2022-12-16 PROCEDURE — 99285 EMERGENCY DEPT VISIT HI MDM: CPT | Mod: 25 | Performed by: EMERGENCY MEDICINE

## 2022-12-16 PROCEDURE — 82310 ASSAY OF CALCIUM: CPT | Performed by: EMERGENCY MEDICINE

## 2022-12-16 PROCEDURE — 84484 ASSAY OF TROPONIN QUANT: CPT | Mod: 91 | Performed by: EMERGENCY MEDICINE

## 2022-12-16 PROCEDURE — 71045 X-RAY EXAM CHEST 1 VIEW: CPT

## 2022-12-16 PROCEDURE — 36415 COLL VENOUS BLD VENIPUNCTURE: CPT | Performed by: EMERGENCY MEDICINE

## 2022-12-16 PROCEDURE — 85041 AUTOMATED RBC COUNT: CPT | Performed by: EMERGENCY MEDICINE

## 2022-12-16 PROCEDURE — 93005 ELECTROCARDIOGRAM TRACING: CPT | Performed by: EMERGENCY MEDICINE

## 2022-12-16 PROCEDURE — 84484 ASSAY OF TROPONIN QUANT: CPT | Performed by: EMERGENCY MEDICINE

## 2022-12-16 PROCEDURE — 93010 ELECTROCARDIOGRAM REPORT: CPT | Performed by: EMERGENCY MEDICINE

## 2022-12-16 ASSESSMENT — ACTIVITIES OF DAILY LIVING (ADL)
ADLS_ACUITY_SCORE: 35
ADLS_ACUITY_SCORE: 35

## 2022-12-16 NOTE — ED TRIAGE NOTES
Chest pain that started about 45 minutes PTA, homecare reports clammy and pale, Homecare administed aspirin and nitro EMS gave 3 sprays of nitro which improved the chest pressure.  Hypertensive, history of dementia.

## 2022-12-16 NOTE — DISCHARGE INSTRUCTIONS
Your EKG, chest x-ray, and lab work did not show any sign of acute strain or stress on your heart    Continue all your medications as previously prescribed    [facility]    Return to the emergency room if you develop any new or worsening symptoms

## 2022-12-16 NOTE — ED PROVIDER NOTES
History     Chief Complaint   Patient presents with     Chest Pain     HPI  Brannon Boggs is a 82 year old male who presents to the emergency room via EMS for chest pain.  Symptoms started about 45 minutes prior to arrival.  Patient was being visited by home care nurse, who reported the patient was complaining of chest pain, was clammy and pale.  They gave him 325 of aspirin and EMS gave 3 sprays of intranasal nitroglycerin, which have improved symptoms.  Patient states that he feels fine now, no complaints of chest pain and is not diaphoretic.  Does have a history of dementia and is somewhat poor historian.    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  Added automatically from request for surgery [id number]       Leukocytosis 08/01/2018     Priority: Medium     Agent Orange poisoning 04/06/2011     Priority: Medium     Formatting of this note might be different from the original.  Pers. Hx of exposure to agent orange       Hypercholesteremia 04/06/2011     Priority: Medium     Essential hypertension 07/19/2005     Priority: Medium     Formatting of this note might be different from the original.  Borderline  IMO Update          Past Medical  History:    No past medical history on file.    Past Surgical History:    No past surgical history on file.    Family History:    No family history on file.    Social History:  Marital Status:   [2]  Social History     Tobacco Use     Smoking status: Former     Smokeless tobacco: Never   Vaping Use     Vaping Use: Never used   Substance Use Topics     Alcohol use: Not Currently     Drug use: Not Currently        Medications:    acetaminophen (TYLENOL) 500 MG tablet  alum & mag hydroxide-simethicone (MAALOX ADVANCED MAX ST) 400-400-40 MG/5ML SUSP suspension  aspirin (ASA) 81 MG EC tablet  atorvastatin (LIPITOR) 40 MG tablet  blood glucose (ACCU-CHEK MAGGIE PLUS) test strip  famotidine (PEPCID) 20 MG tablet  gabapentin (NEURONTIN) 300 MG capsule  insulin glargine (LANTUS PEN) 100 UNIT/ML pen  levothyroxine (SYNTHROID/LEVOTHROID) 50 MCG tablet  metFORMIN (GLUCOPHAGE) 500 MG tablet  metoprolol tartrate (LOPRESSOR) 25 MG tablet  nitroGLYcerin (NITROSTAT) 0.4 MG sublingual tablet  pantoprazole (PROTONIX) 40 MG EC tablet  Psyllium (NATURAL FIBER THERAPY) 48.57 % POWD  senna (SENOKOT) 8.6 MG tablet  vitamin B-12 (CYANOCOBALAMIN) 1000 MCG tablet  blood glucose monitoring (SOFTCLIX) lancets  insulin pen needle (31G X 8 MM) 31G X 8 MM miscellaneous  ORDER FOR DME          Review of Systems   All other systems reviewed and are negative.      Physical Exam   BP: (!) 154/93  Pulse: 69  Temp: 97.7  F (36.5  C)  Resp: 17  Height: 182.9 cm (6')  Weight: 104.8 kg (231 lb)  SpO2: 94 %      Physical Exam  Vitals and nursing note reviewed.   Constitutional:       General: He is not in acute distress.     Appearance: He is not diaphoretic.   HENT:      Head: Atraumatic.      Mouth/Throat:      Mouth: Oropharynx is clear and moist.   Eyes:      General: No scleral icterus.     Pupils: Pupils are equal, round, and reactive to light.   Cardiovascular:      Rate and Rhythm: Normal rate and regular rhythm.      Pulses: Intact distal  pulses.      Heart sounds: Normal heart sounds.   Pulmonary:      Effort: Pulmonary effort is normal. No respiratory distress.      Breath sounds: Normal breath sounds.   Chest:      Chest wall: No tenderness.   Abdominal:      General: Bowel sounds are normal.      Palpations: Abdomen is soft.      Tenderness: There is no abdominal tenderness.   Musculoskeletal:         General: No tenderness or edema.   Skin:     General: Skin is warm.      Findings: No rash.   Neurological:      General: No focal deficit present.      Mental Status: He is alert.         ED Course                 Procedures              EKG Interpretation:      Interpreted by Cass Gee DO  Time reviewed: 1155  Symptoms at time of EKG: None   Rhythm: paced  Rate: Normal  Comparison to prior: Unchanged    Clinical Impression: paced rhythm                Critical Care time:  none               Results for orders placed or performed during the hospital encounter of 12/16/22 (from the past 24 hour(s))   CBC with platelets differential    Narrative    The following orders were created for panel order CBC with platelets differential.  Procedure                               Abnormality         Status                     ---------                               -----------         ------                     CBC with platelets and d...[[id number]]  Abnormal            Final result               RBC and Platelet Morphology[[id number]]  Abnormal            Final result                 Please view results for these tests on the individual orders.   Basic metabolic panel   Result Value Ref Range    Sodium 135 133 - 144 mmol/L    Potassium 4.5 3.4 - 5.3 mmol/L    Chloride 104 94 - 109 mmol/L    Carbon Dioxide (CO2) 25 20 - 32 mmol/L    Anion Gap 6 3 - 14 mmol/L    Urea Nitrogen 16 7 - 30 mg/dL    Creatinine 0.86 0.66 - 1.25 mg/dL    Calcium 8.8 8.5 - 10.1 mg/dL    Glucose 224 (H) 70 - 99 mg/dL    GFR Estimate 87 >60 mL/min/1.73m2   Troponin I   Result  Value Ref Range    Troponin I High Sensitivity 9 <79 ng/L   CBC with platelets and differential   Result Value Ref Range    WBC Count 15.9 (H) 4.0 - 11.0 10e3/uL    RBC Count 4.55 4.40 - 5.90 10e6/uL    Hemoglobin 13.9 13.3 - 17.7 g/dL    Hematocrit 41.8 40.0 - 53.0 %    MCV 92 78 - 100 fL    MCH 30.5 26.5 - 33.0 pg    MCHC 33.3 31.5 - 36.5 g/dL    RDW 13.4 10.0 - 15.0 %    Platelet Count 167 150 - 450 10e3/uL    % Neutrophils 25 %    % Lymphocytes 69 %    % Monocytes 5 %    % Eosinophils 1 %    % Basophils 0 %    % Immature Granulocytes 0 %    NRBCs per 100 WBC 0 <1 /100    Absolute Neutrophils 4.1 1.6 - 8.3 10e3/uL    Absolute Lymphocytes 10.8 (H) 0.8 - 5.3 10e3/uL    Absolute Monocytes 0.8 0.0 - 1.3 10e3/uL    Absolute Eosinophils 0.2 0.0 - 0.7 10e3/uL    Absolute Basophils 0.1 0.0 - 0.2 10e3/uL    Absolute Immature Granulocytes 0.0 <=0.4 10e3/uL    Absolute NRBCs 0.0 10e3/uL   RBC and Platelet Morphology   Result Value Ref Range    Platelet Assessment  Automated Count Confirmed. Platelet morphology is normal.     Automated Count Confirmed. Platelet morphology is normal.    Reactive Lymphocytes Present (A) None Seen    Smudge Cells Present (A) None Seen    RBC Morphology Confirmed RBC Indices    XR Chest Port 1 View    Narrative    CHEST ONE VIEW PORTABLE   12/16/2022 12:30 PM     HISTORY: Chest pain    COMPARISON: Chest x-rays and 5/30/2022.    FINDINGS:  Cardiac monitor leads are projected over the chest, mildly  limiting evaluation. There is a reverse right shoulder arthroplasty.  Left chest wall pacemaker and dual leads are in stable and  satisfactory positions. Lungs are grossly clear. There is mild  perihilar bronchial cuffing which could be associated mild vascular  congestion. The heart is upper normal size which could be due to the  AP technique. Thoracic aortic calcifications are again noted. No  pneumothorax or right pleural fluid collection. Small left pleural  fluid collection is difficult to  exclude, but is not definitely seen.      Impression    IMPRESSION:   1. Stable pacemaker and postoperative findings right shoulder.  2. Mild perihilar peribronchial cuffing may be associated with mild  vascular congestion or bronchitis.  3. No other evidence of acute cardiopulmonary disease is seen. No  findings of overt congestive heart failure are identified.    FRANKO KHAN MD         SYSTEM ID:  [id number]   Troponin I   Result Value Ref Range    Troponin I High Sensitivity 9 <79 ng/L       Medications - No data to display    Assessments & Plan (with Medical Decision Making)  Brannon is an 82-year-old male presenting to the emergency room via EMS from home over sudden onset chest pain approximately 45 minutes ago.  Has been given aspirin and nitro with relief of his symptoms.  See history and focused physical exam as above  Pleasant 82-year-old male in no acute distress, not diaphoretic, vitally stable if mildly hypertensive but otherwise afebrile.  EKG was obtained on arrival and shows paced rhythm, unchanged from last for previous EKGs.  We will get a chest x-ray and lab work and continue to assess  Initial troponin is within acceptable range, no acute concerning findings otherwise on work-up.  Repeated a second troponin after 2 hours which remained stable.  Patient has not had a recurrence of his symptoms while here in the ED.  Stable for discharge home at this time.  Written instructions were provided to continue his home medications as previously prescribed without any changes and return if any new or worsening symptoms develop.  Discharged in no distress     I have reviewed the nursing notes.    I have reviewed the findings, diagnosis, plan and need for follow up with the patient.       Discharge Medication List as of 12/16/2022  3:19 PM          Final diagnoses:   Other chest pain       12/16/2022   [facility] EMERGENCY DEPT     Cass Gee DO  12/16/22 1749

## 2023-01-05 ENCOUNTER — HOSPITAL ENCOUNTER (OUTPATIENT)
Dept: PHYSICAL THERAPY | Facility: CLINIC | Age: 83
Setting detail: THERAPIES SERIES
Discharge: HOME | End: 2023-01-05
Attending: INTERNAL MEDICINE
Payer: COMMERCIAL

## 2023-01-05 PROCEDURE — 97110 THERAPEUTIC EXERCISES: CPT | Mod: GP

## 2023-01-05 PROCEDURE — 97140 MANUAL THERAPY 1/> REGIONS: CPT | Mod: GP

## 2023-01-15 ENCOUNTER — NURSE TRIAGE (OUTPATIENT)
Dept: NURSING | Facility: CLINIC | Age: 83
End: 2023-01-15

## 2023-01-15 NOTE — TELEPHONE ENCOUNTER
"Nurse Triage SBAR    Is this a 2nd Level Triage? No    Situation/Background: Spouse is calling that the patient aches all over, hands are now shaking. Spouse thinks the patient is breathing funny. Spouse put the patient on the phone. Right side of the patients   Sounds hoarse this morning. CTC verbally obtained from the patient..     Assessment:   New numbness on right side of face.  Patient stated he will be \"dead by tonight\"    Recommendation: Per disposition, Call  now. Advised spouse to call back with any new or worsening symptoms. Spouse verbalized understanding and agrees with plan.    Protocol Recommended Disposition: Call 911    Kymberly Hitchcock RN on 1/15/2023 at 1:02 PM  [facility]    Reason for Disposition    [1] Numbness (i.e., loss of sensation) of the face, arm / hand, or leg / foot on one side of the body AND [2] sudden onset AND [3] present now    Additional Information    Negative: [1] SEVERE weakness (i.e., unable to walk or barely able to walk, requires support) AND [2] new-onset or worsening    Negative: [1] Weakness (i.e., paralysis, loss of muscle strength) of the face, arm / hand, or leg / foot on one side of the body AND [2] sudden onset AND [3] present now (Exception: Bell's palsy suspected [i.e., weakness only on one side of the face, developing over hours to days, no other symptoms])    Protocols used: NEUROLOGIC DEFICIT-A-AH      "

## 2023-02-20 NOTE — PROGRESS NOTES
[facility]    OUTPATIENT PHYSICAL THERAPY ORTHOPEDIC EVALUATION  PLAN OF TREATMENT FOR OUTPATIENT REHABILITATION  (COMPLETE FOR INITIAL CLAIMS ONLY)  Patient's Last Name, First Name, M.I.  YOB: 1940  Brannon Boggs    Provider s Name:  [facility]   Medical Record No.  [id number]   Start of Care Date:  10/28/22   Onset Date:  10/12/22   Type:     _X__PT   ___OT   ___SLP Medical Diagnosis:  Right shoulder pain (M25.511)  - Primary     PT Diagnosis:  R shoulder pain w/decreased strength, decreased ROM, impaired posture, and decreased activity tolerance   Visits from SOC:  1      _________________________________________________________________________________  Plan of Treatment/Functional Goals:  joint mobilization, manual therapy, neuromuscular re-education, ROM, strengthening, stretching  as needed  Cryotherapy, Hot packs  as needed  Goals  Goal Identifier: HEP  Goal Description: Pt will demonstrate accurate compliance to HEP w/assist from wife as needed to independently progress strength and function  Target Date: 01/25/23    Goal Identifier: SPADI  Goal Description: Pt will improve SPADI score to at least 43% to show a clinically significant improvement in tolerance for daily activities.  Target Date: 01/25/23    Goal Identifier: AROM  Goal Description: Pt will demonstrate at least 90 deg of shoulder flexion and abduction w/o increased pain to better utilize his dominant arm for daily activities w/o increasing pain  Target Date: 01/25/23    Goal Identifier: Posture  Goal Description: Pt will demonstrate neutral posture w/decreased foward head and rounded shoulders to allow for better arthrokinimatics at his shoulders to reduce pain  Target Date: 01/25/23    Therapy Frequency:  1 time/week  Predicted Duration of Therapy Intervention:  4 months    Monica Weeks,  PT                 I CERTIFY THE NEED FOR THESE SERVICES FURNISHED UNDER        THIS PLAN OF TREATMENT AND WHILE UNDER MY CARE .             Physician Signature               Date    X_____________________________________________________                             Certification Date From:  10/28/22   Certification Date To:  01/05/23    Referring Provider:  Jorge Luis Mitchell MD    Initial Assessment        See Epic Evaluation Start of Care Date: 10/28/22

## 2023-05-08 ENCOUNTER — APPOINTMENT (OUTPATIENT)
Dept: GENERAL RADIOLOGY | Facility: CLINIC | Age: 83
End: 2023-05-08
Attending: EMERGENCY MEDICINE
Payer: COMMERCIAL

## 2023-05-08 ENCOUNTER — HOSPITAL ENCOUNTER (EMERGENCY)
Facility: CLINIC | Age: 83
Discharge: HOME | End: 2023-05-08
Attending: EMERGENCY MEDICINE | Admitting: EMERGENCY MEDICINE
Payer: COMMERCIAL

## 2023-05-08 VITALS
HEART RATE: 68 BPM | SYSTOLIC BLOOD PRESSURE: 138 MMHG | TEMPERATURE: 98.6 F | DIASTOLIC BLOOD PRESSURE: 83 MMHG | WEIGHT: 230 LBS | OXYGEN SATURATION: 99 % | BODY MASS INDEX: 31.19 KG/M2 | RESPIRATION RATE: 18 BRPM

## 2023-05-08 DIAGNOSIS — Z91.81 AT RISK FOR INJURY RELATED TO FALL: ICD-10-CM

## 2023-05-08 DIAGNOSIS — M25.511 ACUTE PAIN OF RIGHT SHOULDER: ICD-10-CM

## 2023-05-08 PROCEDURE — 99283 EMERGENCY DEPT VISIT LOW MDM: CPT | Performed by: EMERGENCY MEDICINE

## 2023-05-08 PROCEDURE — 73030 X-RAY EXAM OF SHOULDER: CPT | Mod: RT

## 2023-05-08 PROCEDURE — 73502 X-RAY EXAM HIP UNI 2-3 VIEWS: CPT

## 2023-05-08 PROCEDURE — 99284 EMERGENCY DEPT VISIT MOD MDM: CPT | Performed by: EMERGENCY MEDICINE

## 2023-05-08 RX ORDER — HYDROCODONE BITARTRATE AND ACETAMINOPHEN 5; 325 MG/1; MG/1
1 TABLET ORAL EVERY 8 HOURS PRN
Qty: 15 TABLET | Refills: 0 | Status: SHIPPED | OUTPATIENT
Start: 2023-05-08 | End: 2023-10-06

## 2023-05-08 ASSESSMENT — ACTIVITIES OF DAILY LIVING (ADL): ADLS_ACUITY_SCORE: 35

## 2023-05-08 NOTE — ED NOTES
Brought pt a bag lunch and some water - per provider discretion. Pt is diabetic. Up to ambulate to restroom - tolerated well. Wife in room with pt.

## 2023-05-08 NOTE — ED NOTES
Reviewed discharge instruction, verbalized understanding. No questions or concerns. Meds reviewed. Pt discharged stable, ambulatory.

## 2023-05-08 NOTE — DISCHARGE INSTRUCTIONS
X-rays of the right shoulder show that you have had a previous reverse shoulder replacement.  The hardware is in proper position.  There is no loosening.  There is no fracture or acute joint injury from the fall.  The fall has caused sufficient soft tissue discomfort to her developing a frozen shoulder that is greatly limiting your range of motion.  It is important to get you into physical therapy.  I placed a referral to the physical therapy department at [facility].  Please contact them for an appointment.  We need to speak with them to make sure that they have validated [facility] the [facility] that they have authorization for you to use the [facility] PT facility.    Apply ice over the shoulder for 20-30 minutes 3 times daily    Hydrocodone is a narcotic.  This is only to be used for severe right shoulder pain.  You can use extra strength Tylenol for mild to moderate pain.  Narcotics can cause drowsiness and increased risk for falls so use on a very limited basis.    Continue using your walker with assistance to avoid falls    X-rays of your pelvis and hip were completed.  Fortunately we do not see any evidence for injury from the fall.

## 2023-05-08 NOTE — ED TRIAGE NOTES
Pt fell 5 days ago.  [facility], nothing [facility], continues to fall and has severe right shoulder pain.         Triage Assessment     Row Name 05/08/23 1155       Triage Assessment (Adult)    Airway WDL WDL       Respiratory WDL    Respiratory WDL WDL

## 2023-05-09 NOTE — ED PROVIDER NOTES
History     Chief Complaint   Patient presents with     Shoulder Injury     Fall     HPI  Brannon Boggs is a 83 year old male who presents for fall related injuries.  He fell this past week on his right shoulder previous had a reverse shoulder arthroplasty in the past.  He is also complaining of some right iliac crest/pelvic/hip pain.  He was seen at the [facility] where they did x-rays and found no joint or osseous injury.  They noted no loosening or disruption to the right shoulder prosthesis.  He has been unwilling to use the right arm because of limited range of motion due to pain.  He was not referred to physical therapy.  He has been having more frequent falls.  Using his walker.    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  Added automatically from request for surgery [id number]       Leukocytosis 08/01/2018     Priority: Medium     Agent Orange poisoning 04/06/2011     Priority: Medium     Formatting of this note might be different from the original.  Pers. Hx of exposure to agent orange       Hypercholesteremia 04/06/2011     Priority: Medium     Essential hypertension 07/19/2005     Priority: Medium     Formatting of this note  might be different from the original.  Borderline  IMO Update          Past Medical History:    No past medical history on file.    Past Surgical History:    No past surgical history on file.    Family History:    No family history on file.    Social History:  Marital Status:   [2]  Social History     Tobacco Use     Smoking status: Former     Smokeless tobacco: Never   Vaping Use     Vaping status: Never Used   Substance Use Topics     Alcohol use: Not Currently     Drug use: Not Currently        Medications:    HYDROcodone-acetaminophen (NORCO) 5-325 MG tablet  acetaminophen (TYLENOL) 500 MG tablet  alum & mag hydroxide-simethicone (MAALOX ADVANCED MAX ST) 400-400-40 MG/5ML SUSP suspension  aspirin (ASA) 81 MG EC tablet  atorvastatin (LIPITOR) 40 MG tablet  blood glucose (ACCU-CHEK MAGGIE PLUS) test strip  blood glucose monitoring (SOFTCLIX) lancets  famotidine (PEPCID) 20 MG tablet  gabapentin (NEURONTIN) 300 MG capsule  insulin glargine (LANTUS PEN) 100 UNIT/ML pen  insulin pen needle (31G X 8 MM) 31G X 8 MM miscellaneous  levothyroxine (SYNTHROID/LEVOTHROID) 50 MCG tablet  metFORMIN (GLUCOPHAGE) 500 MG tablet  metoprolol tartrate (LOPRESSOR) 25 MG tablet  nitroGLYcerin (NITROSTAT) 0.4 MG sublingual tablet  ORDER FOR DME  pantoprazole (PROTONIX) 40 MG EC tablet  Psyllium (NATURAL FIBER THERAPY) 48.57 % POWD  senna (SENOKOT) 8.6 MG tablet  vitamin B-12 (CYANOCOBALAMIN) 1000 MCG tablet          Review of Systems   All other systems reviewed and are negative.      Physical Exam   BP: 138/83  Pulse: 68  Temp: 98.6  F (37  C)  Resp: 18  Weight: 104.3 kg (230 lb)  SpO2: 99 %      Physical Exam  Vitals and nursing note reviewed.   Constitutional:       Appearance: He is not ill-appearing.   HENT:      Head: Normocephalic.      Nose: Nose normal.   Eyes:      Conjunctiva/sclera: Conjunctivae normal.   Cardiovascular:      Rate and Rhythm: Normal rate.   Pulmonary:      Effort: Pulmonary effort is normal.    Musculoskeletal:      Comments: Right shoulder: Incision anterior aspect.  There is no abrasions, contusions.  No ballotable joint effusion.  Does have pain in the anterior joint line.  The humeral prosthesis is well-seated in the glenoid.  Limited range of motion due to discomfort creating almost a frozen shoulder.  CMS to the right upper extremity intact.    Right hip: Soft tissue tenderness around the iliac crest extending down into the mid body of the ileum.  JONATHAN testing right hip did not elicit pain but does have restricted range of motion.   Skin:     General: Skin is warm.      Capillary Refill: Capillary refill takes less than 2 seconds.      Findings: No rash.   Neurological:      General: No focal deficit present.      Mental Status: He is alert and oriented to person, place, and time.   Psychiatric:         Mood and Affect: Mood normal.         Behavior: Behavior normal.         ED Course                 Procedures                  Results for orders placed or performed during the hospital encounter of 05/08/23 (from the past 24 hour(s))   XR Shoulder Right G/E 3 Views    Narrative    XR SHOULDER RIGHT G/E 3 VIEWS  5/8/2023 12:35 PM     HISTORY: Fall related injury with previous reverse shoulder procedure;  pain  COMPARISON: 3/25/2021      Impression    IMPRESSION: Status post reverse right total shoulder arthroplasty. No  hardware complication. No acute fracture or malalignment. Moderate to  severe acromioclavicular joint degenerative changes. With moderate  subacromial enthesophyte. There is a 1 cm chronic ossicle near the  humeral surgical neck. Osteopenia. There are 2 small linear foreign  bodies overlying the coracoclavicular interval which measure up to 5  mm. Cardiac pacer leads.     CAMERON FORD MD         SYSTEM ID:  [id number]   XR Pelvis w Hip Right 1 View    Narrative    XR PELVIS AND HIP RIGHT 1 VIEW  5/8/2023 12:35 PM     HISTORY: fall, pain  COMPARISON: 12/9/2019      Impression     IMPRESSION: Status post bilateral hip joint degenerative changes. No  hardware complication. Heterotopic ossification at the bilateral hips.  Vascular calcification.    CAMERON FORD MD         SYSTEM ID:  [id number]       Medications - No data to display    Assessments & Plan (with Medical Decision Making) follow-up appointment ED for fall related right shoulder and right pelvic pain.  He had been seen prior through the [facility] for x-ray showed no acute joint injury.  He is status post reverse right shoulder arthroplasty.  He was unwilling to move the shoulder because of pain.  His exam was concerning for more of a frozen shoulder.  We did ivis-ray to make sure there was no occult fracture missed.  We do not see any evidence for loosening of the hardware or fracture.  Referred him to physical therapy.  The patient also had some iliac crest and ischium pain.  Did not extend into the hip joint.  We x-rayed the pelvis and right hip and there was no acute injury but does have some degenerative changes in bilateral hip joints.  Discharge from ED and referred to physical therapy.      Discharge instructions:  X-rays of the right shoulder show that you have had a previous reverse shoulder replacement.  The hardware is in proper position.  There is no loosening.  There is no fracture or acute joint injury from the fall.  The fall has caused sufficient soft tissue discomfort to her developing a frozen shoulder that is greatly limiting your range of motion.  It is important to get you into physical therapy.  I placed a referral to the physical therapy department at [facility].  Please contact them for an appointment.  We need to speak with them to make sure that they have validated [facility] that they have [facility] for you to use the [facility] PT facility.  Apply ice over the shoulder for 20-30 minutes 3 times daily  Hydrocodone is a narcotic.  This is only to be used for  severe right shoulder pain.  You can use extra strength Tylenol for mild to moderate pain.  Narcotics can cause drowsiness and increased risk for falls so use on a very limited basis.  Continue using your walker with assistance to avoid falls  X-rays of your pelvis and hip were completed.  Fortunately we do not see any evidence for injury from the fall.     I have reviewed the nursing notes.    I have reviewed the findings, diagnosis, plan and need for follow up with the patient.          Discharge Medication List as of 5/8/2023  2:18 PM      START taking these medications    Details   HYDROcodone-acetaminophen (NORCO) 5-325 MG tablet Take 1 tablet by mouth every 8 hours as needed, Disp-15 tablet, R-0, E-Prescribe             Final diagnoses:   At risk for injury related to fall   Acute pain of right shoulder - Fall related/status post reverse shoulder replacement       5/8/2023   [facility] EMERGENCY DEPT     Killian Mckeon,   05/08/23 0610

## 2023-10-06 ENCOUNTER — HOSPITAL ENCOUNTER (EMERGENCY)
Facility: CLINIC | Age: 83
Discharge: HOME | End: 2023-10-06
Attending: FAMILY MEDICINE | Admitting: FAMILY MEDICINE
Payer: COMMERCIAL

## 2023-10-06 VITALS
BODY MASS INDEX: 31.15 KG/M2 | TEMPERATURE: 98.4 F | HEIGHT: 72 IN | OXYGEN SATURATION: 98 % | DIASTOLIC BLOOD PRESSURE: 65 MMHG | HEART RATE: 73 BPM | SYSTOLIC BLOOD PRESSURE: 121 MMHG | WEIGHT: 230 LBS | RESPIRATION RATE: 20 BRPM

## 2023-10-06 DIAGNOSIS — R31.9 HEMATURIA, UNSPECIFIED TYPE: ICD-10-CM

## 2023-10-06 LAB
ALBUMIN UR-MCNC: 100 MG/DL
APPEARANCE UR: CLEAR
BACTERIA #/AREA URNS HPF: ABNORMAL /HPF
BILIRUB UR QL STRIP: NEGATIVE
COLOR UR AUTO: YELLOW
GLUCOSE UR STRIP-MCNC: NEGATIVE MG/DL
HGB UR QL STRIP: ABNORMAL
KETONES UR STRIP-MCNC: NEGATIVE MG/DL
LEUKOCYTE ESTERASE UR QL STRIP: NEGATIVE
NITRATE UR QL: NEGATIVE
PH UR STRIP: 6 [PH] (ref 5–7)
RBC URINE: >182 /HPF
SP GR UR STRIP: 1.01 (ref 1–1.03)
UROBILINOGEN UR STRIP-MCNC: NORMAL MG/DL
WBC URINE: 28 /HPF

## 2023-10-06 PROCEDURE — 99283 EMERGENCY DEPT VISIT LOW MDM: CPT | Performed by: FAMILY MEDICINE

## 2023-10-06 PROCEDURE — 81001 URINALYSIS AUTO W/SCOPE: CPT | Performed by: FAMILY MEDICINE

## 2023-10-06 PROCEDURE — 87086 URINE CULTURE/COLONY COUNT: CPT | Performed by: FAMILY MEDICINE

## 2023-10-06 RX ORDER — ZINC SULFATE 50(220)MG
50 CAPSULE ORAL DAILY
COMMUNITY
Start: 2023-09-25

## 2023-10-06 RX ORDER — CEPHALEXIN 500 MG/1
500 CAPSULE ORAL 2 TIMES DAILY
Qty: 14 CAPSULE | Refills: 0 | Status: SHIPPED | OUTPATIENT
Start: 2023-10-06 | End: 2023-10-13

## 2023-10-06 ASSESSMENT — ACTIVITIES OF DAILY LIVING (ADL): ADLS_ACUITY_SCORE: 35

## 2023-10-06 NOTE — DISCHARGE INSTRUCTIONS
1.  Make sure you call your doctor on Monday to see if you can see our urologist here in [place] or if you need to [facility].  2.  Please return to the emergency department if you do develop any type of pain in your belly or back or if you are having trouble urinating.  3.  I want you to push lots of fluids over the weekend to make more urine to help flush the blood out of your bladder.  4.  Also return if you do develop a fever or if you are feeling dizzy or lightheaded.  5.  For the rash on your shoulder you can try some over-the-counter hydrocortisone cream on this.

## 2023-10-06 NOTE — MEDICATION SCRIBE - ADMISSION MEDICATION HISTORY
Medication Scribe Admission Medication History    Admission medication history is complete. The information provided in this note is only as accurate as the sources available at the time of the update.    Information Source(s): Family member and CareEverywhere/SureScripts via in-person    Pertinent Information: wife Rossana    Changes made to PTA medication list:  Added: Zinc, D3 and C  Deleted: Maalox susp, Norco, Metoprolol  Changed: pepcid is BID    Medication Affordability:  Not including over the counter (OTC) medications, was there a time in the past 3 months when you did not take your medications as prescribed because of cost?: No    Allergies reviewed with patient and updates made in EHR: yes    Medication History Completed By: OLINDA LACY 10/6/2023 5:06 PM    PTA Med List   Medication Sig Last Dose    acetaminophen (TYLENOL) 500 MG tablet Take 1,000 mg by mouth every 6 hours as needed for pain 10/5/2023 at [facility]    ascorbic acid 1000 MG TABS tablet Take 1,000 mg by mouth daily 10/6/2023 at am    aspirin (ASA) 81 MG EC tablet Take 81 mg by mouth daily 10/6/2023 at am    atorvastatin (LIPITOR) 40 MG tablet Take 40 mg by mouth At Bedtime 10/5/2023 at hs    blood glucose (ACCU-CHEK MAGGIE PLUS) test strip 1 strip by In Vitro route daily 10/6/2023 at am #110    blood glucose monitoring (SOFTCLIX) lancets 1 each by In Vitro route daily 10/6/2023 at am    famotidine (PEPCID) 20 MG tablet Take 2 tablets (40 mg) by mouth At Bedtime (Patient taking differently: Take 20 mg by mouth 2 times daily) 10/6/2023 at am    gabapentin (NEURONTIN) 300 MG capsule Take 600 mg by mouth 2 times daily Morning and bedtime 10/6/2023 at am    insulin glargine (LANTUS PEN) 100 UNIT/ML pen Inject 32 Units Subcutaneous every morning  10/6/2023 at am    insulin pen needle (31G X 8 MM) 31G X 8 MM miscellaneous Inject 1 each Subcutaneous daily For lantus 10/6/2023 at am    levothyroxine (SYNTHROID/LEVOTHROID) 50 MCG tablet Take 50 mcg by mouth  daily before breakfast 10/6/2023 at am    metFORMIN (GLUCOPHAGE) 500 MG tablet Take 1,000 mg by mouth 2 times daily (with meals) 10/6/2023 at am    nitroGLYcerin (NITROSTAT) 0.4 MG sublingual tablet Place 0.4 mg under the tongue every 5 minutes as needed for chest pain More than a month at on hand    ORDER FOR DME Equipment being ordered: Walker 10/6/2023 at at home    pantoprazole (PROTONIX) 40 MG EC tablet Take 40 mg by mouth daily 1/2 hour before eating 10/6/2023 at am    Psyllium (NATURAL FIBER THERAPY) 48.57 % POWD Take 3 tsp by mouth daily as needed (constipation) Past Week at [facility]    senna (SENOKOT) 8.6 MG tablet Take 17.2 mg by mouth At Bedtime 10/5/2023 at hs    vitamin B-12 (CYANOCOBALAMIN) 1000 MCG tablet Take 1,000 mcg by mouth daily 10/6/2023 at am    Vitamin D3 (CHOLECALCIFEROL) 125 MCG (5000 UT) tablet Take 125 mcg by mouth daily 10/6/2023 at am    zinc sulfate (ZINCATE) 220 (50 Zn) MG capsule Take 50 mg by mouth daily 10/6/2023 at am

## 2023-10-06 NOTE — ED TRIAGE NOTES
Reports noting blood in his urine this afternoon.      Triage Assessment       Row Name 10/06/23 9700       Triage Assessment (Adult)    Airway WDL WDL       Respiratory WDL    Respiratory WDL WDL       Skin Circulation/Temperature WDL    Skin Circulation/Temperature WDL WDL       Cardiac WDL    Cardiac WDL WDL       Peripheral/Neurovascular WDL    Peripheral Neurovascular WDL WDL       Cognitive/Neuro/Behavioral WDL    Cognitive/Neuro/Behavioral WDL WDL

## 2023-10-06 NOTE — ED PROVIDER NOTES
History     Chief Complaint   Patient presents with    Hematuria     HPI  Brannon Boggs is a 83 year old male who presents with concerns of hematuria that just started today.  Patient has not had this happen to him before.  Denies any recent trauma.  Patient states that he is on no blood thinners, just takes an aspirin a day.  Denies any belly pain or back pain.  Denies any nausea any vomiting.  Patient has been able to urinate today.  Denies any urgency or frequency    Allergies:  No Known Allergies    Problem List:    Patient Active Problem List    Diagnosis Date Noted    Diabetic neuropathy (H) 06/01/2021     Priority: Medium    Type 2 diabetes mellitus without complication (H) 06/01/2021     Priority: Medium    Hypothyroidism 06/01/2021     Priority: Medium    Paroxysmal atrial fibrillation (H) 06/01/2021     Priority: Medium    Status post reverse total replacement of right shoulder 03/27/2021     Priority: Medium    Late onset Alzheimer's disease without behavioral disturbance (H) 02/28/2021     Priority: Medium    Vertebral artery occlusion 02/11/2021     Priority: Medium    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  Pers. Hx of exposure to agent orange      Hypercholesteremia 04/06/2011     Priority: Medium    Essential hypertension 07/19/2005     Priority: Medium     Formatting of this note might be different from the original.  Borderline  IMO Update          Past Medical History:    History reviewed. No pertinent past medical history.    Past Surgical History:    History reviewed. No pertinent surgical history.    Family  History:    No family history on file.    Social History:  Marital Status:   [2]  Social History     Tobacco Use    Smoking status: Former    Smokeless tobacco: Never   Vaping Use    Vaping Use: Never used   Substance Use Topics    Alcohol use: Not Currently    Drug use: Not Currently        Medications:    acetaminophen (TYLENOL) 500 MG tablet  ascorbic acid 1000 MG TABS tablet  aspirin (ASA) 81 MG EC tablet  atorvastatin (LIPITOR) 40 MG tablet  blood glucose (ACCU-CHEK MAGGIE PLUS) test strip  blood glucose monitoring (SOFTCLIX) lancets  cephALEXin (KEFLEX) 500 MG capsule  famotidine (PEPCID) 20 MG tablet  gabapentin (NEURONTIN) 300 MG capsule  insulin glargine (LANTUS PEN) 100 UNIT/ML pen  insulin pen needle (31G X 8 MM) 31G X 8 MM miscellaneous  levothyroxine (SYNTHROID/LEVOTHROID) 50 MCG tablet  metFORMIN (GLUCOPHAGE) 500 MG tablet  nitroGLYcerin (NITROSTAT) 0.4 MG sublingual tablet  ORDER FOR DME  pantoprazole (PROTONIX) 40 MG EC tablet  Psyllium (NATURAL FIBER THERAPY) 48.57 % POWD  senna (SENOKOT) 8.6 MG tablet  vitamin B-12 (CYANOCOBALAMIN) 1000 MCG tablet  Vitamin D3 (CHOLECALCIFEROL) 125 MCG (5000 UT) tablet  zinc sulfate (ZINCATE) 220 (50 Zn) MG capsule          Review of Systems   All other systems reviewed and are negative.      Physical Exam   BP: 121/65  Pulse: 73  Temp: 98.4  F (36.9  C)  Resp: 20  Height: 182.9 cm (6')  Weight: 104.3 kg (230 lb)  SpO2: 98 %      Physical Exam  Vitals and nursing note reviewed.   Constitutional:       General: He is not in acute distress.     Appearance: Normal appearance. He is not ill-appearing.   Abdominal:      General: Abdomen is flat. There is no distension.      Tenderness: There is no abdominal tenderness.   Musculoskeletal:      Cervical back: Normal range of motion.   Skin:     General: Skin is warm and dry.      Capillary Refill: Capillary refill takes less than 2 seconds.      Findings: No rash.   Neurological:      Mental Status: He is alert.          ED Course                 Procedures           Results for orders placed or performed during the hospital encounter of 10/06/23 (from the past 24 hour(s))   UA with Microscopic reflex to Culture    Specimen: Urine, Midstream   Result Value Ref Range    Color Urine Yellow Colorless, Straw, Light Yellow, Yellow    Appearance Urine Clear Clear    Glucose Urine Negative Negative mg/dL    Bilirubin Urine Negative Negative    Ketones Urine Negative Negative mg/dL    Specific Gravity Urine 1.010 1.003 - 1.035    Blood Urine Large (A) Negative    pH Urine 6.0 5.0 - 7.0    Protein Albumin Urine 100 (A) Negative mg/dL    Urobilinogen Urine Normal Normal, 2.0 mg/dL    Nitrite Urine Negative Negative    Leukocyte Esterase Urine Negative Negative    Bacteria Urine Few (A) None Seen /HPF    RBC Urine >182 (H) <=2 /HPF    WBC Urine 28 (H) <=5 /HPF    Narrative    Urine Culture ordered based on laboratory criteria       Medications - No data to display    Urine came back and does show a fair amount of blood in the urine and a few infectious markers.  Patient has no belly pain and no back or flank pain.  I do not suspect kidney stones.  Certainly I think this hematuria does need more of a work-up including a outpatient referral to urology for possible cystoscopy.  In the meantime we will going to treat this for possible infection with a course of Keflex.  Patient was told to push fluids over the weekend.  Patient was given strict return precautions and will be discharged at this time.    Assessments & Plan (with Medical Decision Making)  Hematuria     I have reviewed the nursing notes.    I have reviewed the findings, diagnosis, plan and need for follow up with the patient.      New Prescriptions    CEPHALEXIN (KEFLEX) 500 MG CAPSULE    Take 1 capsule (500 mg) by mouth 2 times daily for 7 days       Final diagnoses:   Hematuria, unspecified type       10/6/2023   [facility] EMERGENCY DEPT       Wiliam Dixon  MD Julito  10/06/23 9533

## 2023-10-08 LAB — BACTERIA UR CULT: NORMAL

## 2023-10-08 NOTE — RESULT ENCOUNTER NOTE
Final urine culture report is negative.  Adult Negative Urine culture parameters per protocol: Any # Urogenital single or mixed organism, <10,000 col/ml single organism (cath/midstream), and > 3 organisms (No susceptibilities performed).  [facility] Emergency Dept discharge antibiotic prescribed (If applicable): Cephalexin  Treatment recommendations per [facility] ED Lab Result Urine Culture protocol.

## 2023-11-17 ENCOUNTER — HOSPITAL ENCOUNTER (EMERGENCY)
Facility: CLINIC | Age: 83
Discharge: ANOTHER HOSPITAL | DRG: 872 | End: 2023-11-18
Attending: FAMILY MEDICINE | Admitting: FAMILY MEDICINE
Payer: COMMERCIAL

## 2023-11-17 ENCOUNTER — APPOINTMENT (OUTPATIENT)
Dept: CT IMAGING | Facility: CLINIC | Age: 83
DRG: 872 | End: 2023-11-17
Attending: FAMILY MEDICINE
Payer: COMMERCIAL

## 2023-11-17 ENCOUNTER — APPOINTMENT (OUTPATIENT)
Dept: ULTRASOUND IMAGING | Facility: CLINIC | Age: 83
DRG: 872 | End: 2023-11-17
Attending: FAMILY MEDICINE
Payer: COMMERCIAL

## 2023-11-17 DIAGNOSIS — R79.89 ELEVATED LFTS: ICD-10-CM

## 2023-11-17 DIAGNOSIS — A41.9 SEPSIS WITHOUT ACUTE ORGAN DYSFUNCTION, DUE TO UNSPECIFIED ORGANISM (H): ICD-10-CM

## 2023-11-17 DIAGNOSIS — K80.50 GALL STONES, COMMON BILE DUCT: ICD-10-CM

## 2023-11-17 LAB
ALBUMIN SERPL BCG-MCNC: 4 G/DL (ref 3.5–5.2)
ALBUMIN UR-MCNC: NEGATIVE MG/DL
ALP SERPL-CCNC: 726 U/L (ref 40–150)
ALT SERPL W P-5'-P-CCNC: 744 U/L (ref 0–70)
ANION GAP SERPL CALCULATED.3IONS-SCNC: 13 MMOL/L (ref 7–15)
APPEARANCE UR: CLEAR
AST SERPL W P-5'-P-CCNC: 1013 U/L (ref 0–45)
BASOPHILS # BLD AUTO: 0.1 10E3/UL (ref 0–0.2)
BASOPHILS NFR BLD AUTO: 1 %
BILIRUB SERPL-MCNC: 1.5 MG/DL
BILIRUB UR QL STRIP: NEGATIVE
BUN SERPL-MCNC: 11.8 MG/DL (ref 8–23)
CALCIUM SERPL-MCNC: 9.1 MG/DL (ref 8.8–10.2)
CHLORIDE SERPL-SCNC: 96 MMOL/L (ref 98–107)
COLOR UR AUTO: YELLOW
CREAT SERPL-MCNC: 0.88 MG/DL (ref 0.67–1.17)
DEPRECATED HCO3 PLAS-SCNC: 24 MMOL/L (ref 22–29)
EGFRCR SERPLBLD CKD-EPI 2021: 86 ML/MIN/1.73M2
EOSINOPHIL # BLD AUTO: 0.1 10E3/UL (ref 0–0.7)
EOSINOPHIL NFR BLD AUTO: 1 %
ERYTHROCYTE [DISTWIDTH] IN BLOOD BY AUTOMATED COUNT: 13.4 % (ref 10–15)
FLUAV RNA SPEC QL NAA+PROBE: NEGATIVE
FLUBV RNA RESP QL NAA+PROBE: NEGATIVE
GLUCOSE SERPL-MCNC: 278 MG/DL (ref 70–99)
GLUCOSE UR STRIP-MCNC: 150 MG/DL
HCT VFR BLD AUTO: 41.8 % (ref 40–53)
HGB BLD-MCNC: 13.6 G/DL (ref 13.3–17.7)
HGB UR QL STRIP: NEGATIVE
IMM GRANULOCYTES # BLD: 0 10E3/UL
IMM GRANULOCYTES NFR BLD: 0 %
KETONES UR STRIP-MCNC: NEGATIVE MG/DL
LACTATE SERPL-SCNC: 1.7 MMOL/L (ref 0.7–2)
LACTATE SERPL-SCNC: 2.2 MMOL/L (ref 0.7–2)
LEUKOCYTE ESTERASE UR QL STRIP: NEGATIVE
LIPASE SERPL-CCNC: 26 U/L (ref 13–60)
LYMPHOCYTES # BLD AUTO: 5.3 10E3/UL (ref 0.8–5.3)
LYMPHOCYTES NFR BLD AUTO: 42 %
MCH RBC QN AUTO: 30 PG (ref 26.5–33)
MCHC RBC AUTO-ENTMCNC: 32.5 G/DL (ref 31.5–36.5)
MCV RBC AUTO: 92 FL (ref 78–100)
MONOCYTES # BLD AUTO: 1 10E3/UL (ref 0–1.3)
MONOCYTES NFR BLD AUTO: 8 %
NEUTROPHILS # BLD AUTO: 6.1 10E3/UL (ref 1.6–8.3)
NEUTROPHILS NFR BLD AUTO: 48 %
NITRATE UR QL: NEGATIVE
NRBC # BLD AUTO: 0 10E3/UL
NRBC BLD AUTO-RTO: 0 /100
PH UR STRIP: 7 [PH] (ref 5–7)
PLATELET # BLD AUTO: 180 10E3/UL (ref 150–450)
POTASSIUM SERPL-SCNC: 4.5 MMOL/L (ref 3.4–5.3)
PROT SERPL-MCNC: 6.7 G/DL (ref 6.4–8.3)
RBC # BLD AUTO: 4.53 10E6/UL (ref 4.4–5.9)
RBC URINE: 5 /HPF
RSV RNA SPEC NAA+PROBE: NEGATIVE
SARS-COV-2 RNA RESP QL NAA+PROBE: NEGATIVE
SODIUM SERPL-SCNC: 133 MMOL/L (ref 135–145)
SP GR UR STRIP: 1.01 (ref 1–1.03)
SQUAMOUS EPITHELIAL: <1 /HPF
UROBILINOGEN UR STRIP-MCNC: 4 MG/DL
WBC # BLD AUTO: 12.6 10E3/UL (ref 4–11)
WBC URINE: 2 /HPF

## 2023-11-17 PROCEDURE — 36415 COLL VENOUS BLD VENIPUNCTURE: CPT | Performed by: FAMILY MEDICINE

## 2023-11-17 PROCEDURE — 96361 HYDRATE IV INFUSION ADD-ON: CPT | Performed by: FAMILY MEDICINE

## 2023-11-17 PROCEDURE — 258N000003 HC RX IP 258 OP 636: Performed by: FAMILY MEDICINE

## 2023-11-17 PROCEDURE — 80053 COMPREHEN METABOLIC PANEL: CPT | Performed by: FAMILY MEDICINE

## 2023-11-17 PROCEDURE — 83690 ASSAY OF LIPASE: CPT | Performed by: FAMILY MEDICINE

## 2023-11-17 PROCEDURE — 76705 ECHO EXAM OF ABDOMEN: CPT

## 2023-11-17 PROCEDURE — 99285 EMERGENCY DEPT VISIT HI MDM: CPT | Performed by: FAMILY MEDICINE

## 2023-11-17 PROCEDURE — 87637 SARSCOV2&INF A&B&RSV AMP PRB: CPT | Performed by: FAMILY MEDICINE

## 2023-11-17 PROCEDURE — 71260 CT THORAX DX C+: CPT

## 2023-11-17 PROCEDURE — 99285 EMERGENCY DEPT VISIT HI MDM: CPT | Mod: 25 | Performed by: FAMILY MEDICINE

## 2023-11-17 PROCEDURE — 96365 THER/PROPH/DIAG IV INF INIT: CPT | Performed by: FAMILY MEDICINE

## 2023-11-17 PROCEDURE — 250N000011 HC RX IP 250 OP 636: Performed by: FAMILY MEDICINE

## 2023-11-17 PROCEDURE — 85025 COMPLETE CBC W/AUTO DIFF WBC: CPT | Performed by: FAMILY MEDICINE

## 2023-11-17 PROCEDURE — 250N000011 HC RX IP 250 OP 636: Mod: JZ | Performed by: FAMILY MEDICINE

## 2023-11-17 PROCEDURE — 81001 URINALYSIS AUTO W/SCOPE: CPT | Performed by: FAMILY MEDICINE

## 2023-11-17 PROCEDURE — 81001 URINALYSIS AUTO W/SCOPE: CPT | Performed by: STUDENT IN AN ORGANIZED HEALTH CARE EDUCATION/TRAINING PROGRAM

## 2023-11-17 PROCEDURE — 250N000009 HC RX 250: Performed by: FAMILY MEDICINE

## 2023-11-17 PROCEDURE — 83605 ASSAY OF LACTIC ACID: CPT | Performed by: FAMILY MEDICINE

## 2023-11-17 RX ORDER — IOPAMIDOL 755 MG/ML
500 INJECTION, SOLUTION INTRAVASCULAR ONCE
Status: COMPLETED | OUTPATIENT
Start: 2023-11-17 | End: 2023-11-17

## 2023-11-17 RX ORDER — PIPERACILLIN SODIUM, TAZOBACTAM SODIUM 4; .5 G/20ML; G/20ML
4.5 INJECTION, POWDER, LYOPHILIZED, FOR SOLUTION INTRAVENOUS ONCE
Status: COMPLETED | OUTPATIENT
Start: 2023-11-17 | End: 2023-11-17

## 2023-11-17 RX ADMIN — PIPERACILLIN AND TAZOBACTAM 4.5 G: 4; .5 INJECTION, POWDER, FOR SOLUTION INTRAVENOUS at 21:06

## 2023-11-17 RX ADMIN — SODIUM CHLORIDE 60 ML: 9 INJECTION, SOLUTION INTRAVENOUS at 22:58

## 2023-11-17 RX ADMIN — IOPAMIDOL 100 ML: 755 INJECTION, SOLUTION INTRAVENOUS at 22:58

## 2023-11-17 RX ADMIN — SODIUM CHLORIDE 1000 ML: 9 INJECTION, SOLUTION INTRAVENOUS at 19:40

## 2023-11-17 ASSESSMENT — ACTIVITIES OF DAILY LIVING (ADL)
ADLS_ACUITY_SCORE: 35
ADLS_ACUITY_SCORE: 35
ADLS_ACUITY_SCORE: 33

## 2023-11-17 NOTE — ED TRIAGE NOTES
EMS brought pt with concerns of body aches and chills for the past few hours.  Vital signs stable with  EMS and pt was placed in the lobby to be triaged.

## 2023-11-18 ENCOUNTER — APPOINTMENT (OUTPATIENT)
Dept: GENERAL RADIOLOGY | Facility: CLINIC | Age: 83
DRG: 872 | End: 2023-11-18
Attending: INTERNAL MEDICINE
Payer: COMMERCIAL

## 2023-11-18 ENCOUNTER — ANESTHESIA (OUTPATIENT)
Dept: SURGERY | Facility: CLINIC | Age: 83
DRG: 872 | End: 2023-11-18
Payer: COMMERCIAL

## 2023-11-18 ENCOUNTER — HOSPITAL ENCOUNTER (INPATIENT)
Facility: CLINIC | Age: 83
LOS: 2 days | Discharge: HOME | DRG: 872 | End: 2023-11-20
Attending: INTERNAL MEDICINE | Admitting: STUDENT IN AN ORGANIZED HEALTH CARE EDUCATION/TRAINING PROGRAM
Payer: COMMERCIAL

## 2023-11-18 ENCOUNTER — ANESTHESIA EVENT (OUTPATIENT)
Dept: SURGERY | Facility: CLINIC | Age: 83
DRG: 872 | End: 2023-11-18
Payer: COMMERCIAL

## 2023-11-18 VITALS
HEART RATE: 65 BPM | SYSTOLIC BLOOD PRESSURE: 152 MMHG | TEMPERATURE: 98.5 F | OXYGEN SATURATION: 95 % | DIASTOLIC BLOOD PRESSURE: 72 MMHG | RESPIRATION RATE: 20 BRPM

## 2023-11-18 DIAGNOSIS — K83.09 CHOLANGITIS (H): Primary | ICD-10-CM

## 2023-11-18 DIAGNOSIS — E11.9 TYPE 2 DIABETES MELLITUS WITHOUT COMPLICATION, WITH LONG-TERM CURRENT USE OF INSULIN (H): ICD-10-CM

## 2023-11-18 DIAGNOSIS — E83.42 HYPOMAGNESEMIA: ICD-10-CM

## 2023-11-18 DIAGNOSIS — E78.00 HYPERCHOLESTEREMIA: ICD-10-CM

## 2023-11-18 DIAGNOSIS — Z79.4 TYPE 2 DIABETES MELLITUS WITHOUT COMPLICATION, WITH LONG-TERM CURRENT USE OF INSULIN (H): ICD-10-CM

## 2023-11-18 LAB
ALBUMIN SERPL BCG-MCNC: 4.2 G/DL (ref 3.5–5.2)
ALP SERPL-CCNC: 675 U/L (ref 40–150)
ALT SERPL W P-5'-P-CCNC: 658 U/L (ref 0–70)
ANION GAP SERPL CALCULATED.3IONS-SCNC: 11 MMOL/L (ref 7–15)
APAP SERPL-MCNC: <5 UG/ML (ref 10–30)
APTT PPP: 29 SECONDS (ref 22–38)
AST SERPL W P-5'-P-CCNC: 552 U/L (ref 0–45)
BILIRUB SERPL-MCNC: 1.9 MG/DL
BUN SERPL-MCNC: 11 MG/DL (ref 8–23)
CALCIUM SERPL-MCNC: 9.4 MG/DL (ref 8.8–10.2)
CHLORIDE SERPL-SCNC: 102 MMOL/L (ref 98–107)
CREAT SERPL-MCNC: 1.01 MG/DL (ref 0.67–1.17)
DEPRECATED HCO3 PLAS-SCNC: 27 MMOL/L (ref 22–29)
EGFRCR SERPLBLD CKD-EPI 2021: 74 ML/MIN/1.73M2
ERCP: NORMAL
ERYTHROCYTE [DISTWIDTH] IN BLOOD BY AUTOMATED COUNT: 13.5 % (ref 10–15)
FIBRINOGEN PPP-MCNC: 432 MG/DL (ref 170–490)
GLUCOSE BLDC GLUCOMTR-MCNC: 200 MG/DL (ref 70–99)
GLUCOSE BLDC GLUCOMTR-MCNC: 240 MG/DL (ref 70–99)
GLUCOSE SERPL-MCNC: 111 MG/DL (ref 70–99)
HCT VFR BLD AUTO: 41.4 % (ref 40–53)
HGB BLD-MCNC: 13.7 G/DL (ref 13.3–17.7)
INR PPP: 1.02 (ref 0.85–1.15)
MAGNESIUM SERPL-MCNC: 1.6 MG/DL (ref 1.7–2.3)
MCH RBC QN AUTO: 30.2 PG (ref 26.5–33)
MCHC RBC AUTO-ENTMCNC: 33.1 G/DL (ref 31.5–36.5)
MCV RBC AUTO: 91 FL (ref 78–100)
PHOSPHATE SERPL-MCNC: 3.9 MG/DL (ref 2.5–4.5)
PLATELET # BLD AUTO: 183 10E3/UL (ref 150–450)
POTASSIUM SERPL-SCNC: 4.2 MMOL/L (ref 3.4–5.3)
PROT SERPL-MCNC: 7.3 G/DL (ref 6.4–8.3)
RBC # BLD AUTO: 4.53 10E6/UL (ref 4.4–5.9)
SODIUM SERPL-SCNC: 140 MMOL/L (ref 135–145)
WBC # BLD AUTO: 12.6 10E3/UL (ref 4–11)

## 2023-11-18 PROCEDURE — C1769 GUIDE WIRE: HCPCS | Performed by: INTERNAL MEDICINE

## 2023-11-18 PROCEDURE — 0FC98ZZ EXTIRPATION OF MATTER FROM COMMON BILE DUCT, VIA NATURAL OR ARTIFICIAL OPENING ENDOSCOPIC: ICD-10-PCS | Performed by: RADIOLOGY

## 2023-11-18 PROCEDURE — 83735 ASSAY OF MAGNESIUM: CPT | Performed by: STUDENT IN AN ORGANIZED HEALTH CARE EDUCATION/TRAINING PROGRAM

## 2023-11-18 PROCEDURE — 85610 PROTHROMBIN TIME: CPT | Performed by: STUDENT IN AN ORGANIZED HEALTH CARE EDUCATION/TRAINING PROGRAM

## 2023-11-18 PROCEDURE — 272N000001 HC OR GENERAL SUPPLY STERILE: Performed by: INTERNAL MEDICINE

## 2023-11-18 PROCEDURE — 87040 BLOOD CULTURE FOR BACTERIA: CPT | Performed by: STUDENT IN AN ORGANIZED HEALTH CARE EDUCATION/TRAINING PROGRAM

## 2023-11-18 PROCEDURE — 250N000011 HC RX IP 250 OP 636: Performed by: NURSE ANESTHETIST, CERTIFIED REGISTERED

## 2023-11-18 PROCEDURE — 93005 ELECTROCARDIOGRAM TRACING: CPT

## 2023-11-18 PROCEDURE — 80143 DRUG ASSAY ACETAMINOPHEN: CPT | Performed by: STUDENT IN AN ORGANIZED HEALTH CARE EDUCATION/TRAINING PROGRAM

## 2023-11-18 PROCEDURE — 74330 X-RAY BILE/PANC ENDOSCOPY: CPT

## 2023-11-18 PROCEDURE — 710N000009 HC RECOVERY PHASE 1, LEVEL 1, PER MIN: Performed by: INTERNAL MEDICINE

## 2023-11-18 PROCEDURE — 250N000013 HC RX MED GY IP 250 OP 250 PS 637: Performed by: STUDENT IN AN ORGANIZED HEALTH CARE EDUCATION/TRAINING PROGRAM

## 2023-11-18 PROCEDURE — 85027 COMPLETE CBC AUTOMATED: CPT | Performed by: STUDENT IN AN ORGANIZED HEALTH CARE EDUCATION/TRAINING PROGRAM

## 2023-11-18 PROCEDURE — 99223 1ST HOSP IP/OBS HIGH 75: CPT | Performed by: STUDENT IN AN ORGANIZED HEALTH CARE EDUCATION/TRAINING PROGRAM

## 2023-11-18 PROCEDURE — 84100 ASSAY OF PHOSPHORUS: CPT | Performed by: STUDENT IN AN ORGANIZED HEALTH CARE EDUCATION/TRAINING PROGRAM

## 2023-11-18 PROCEDURE — 250N000011 HC RX IP 250 OP 636: Performed by: STUDENT IN AN ORGANIZED HEALTH CARE EDUCATION/TRAINING PROGRAM

## 2023-11-18 PROCEDURE — 85730 THROMBOPLASTIN TIME PARTIAL: CPT | Performed by: STUDENT IN AN ORGANIZED HEALTH CARE EDUCATION/TRAINING PROGRAM

## 2023-11-18 PROCEDURE — C2617 STENT, NON-COR, TEM W/O DEL: HCPCS | Performed by: INTERNAL MEDICINE

## 2023-11-18 PROCEDURE — 80053 COMPREHEN METABOLIC PANEL: CPT | Performed by: STUDENT IN AN ORGANIZED HEALTH CARE EDUCATION/TRAINING PROGRAM

## 2023-11-18 PROCEDURE — 85384 FIBRINOGEN ACTIVITY: CPT | Performed by: STUDENT IN AN ORGANIZED HEALTH CARE EDUCATION/TRAINING PROGRAM

## 2023-11-18 PROCEDURE — 250N000013 HC RX MED GY IP 250 OP 250 PS 637: Performed by: INTERNAL MEDICINE

## 2023-11-18 PROCEDURE — 0F798DZ DILATION OF COMMON BILE DUCT WITH INTRALUMINAL DEVICE, VIA NATURAL OR ARTIFICIAL OPENING ENDOSCOPIC: ICD-10-PCS | Performed by: RADIOLOGY

## 2023-11-18 PROCEDURE — 258N000003 HC RX IP 258 OP 636: Performed by: INTERNAL MEDICINE

## 2023-11-18 PROCEDURE — 80074 ACUTE HEPATITIS PANEL: CPT | Performed by: STUDENT IN AN ORGANIZED HEALTH CARE EDUCATION/TRAINING PROGRAM

## 2023-11-18 PROCEDURE — 93010 ELECTROCARDIOGRAM REPORT: CPT | Performed by: INTERNAL MEDICINE

## 2023-11-18 PROCEDURE — 360N000082 HC SURGERY LEVEL 2 W/ FLUORO, PER MIN: Performed by: INTERNAL MEDICINE

## 2023-11-18 PROCEDURE — 36415 COLL VENOUS BLD VENIPUNCTURE: CPT | Performed by: STUDENT IN AN ORGANIZED HEALTH CARE EDUCATION/TRAINING PROGRAM

## 2023-11-18 PROCEDURE — 120N000001 HC R&B MED SURG/OB

## 2023-11-18 PROCEDURE — 258N000003 HC RX IP 258 OP 636: Performed by: STUDENT IN AN ORGANIZED HEALTH CARE EDUCATION/TRAINING PROGRAM

## 2023-11-18 PROCEDURE — 370N000017 HC ANESTHESIA TECHNICAL FEE, PER MIN: Performed by: INTERNAL MEDICINE

## 2023-11-18 PROCEDURE — 99207 PR APP CREDIT; MD BILLING SHARED VISIT: CPT | Performed by: INTERNAL MEDICINE

## 2023-11-18 PROCEDURE — 999N000141 HC STATISTIC PRE-PROCEDURE NURSING ASSESSMENT: Performed by: INTERNAL MEDICINE

## 2023-11-18 PROCEDURE — 250N000009 HC RX 250: Performed by: NURSE ANESTHETIST, CERTIFIED REGISTERED

## 2023-11-18 PROCEDURE — 250N000012 HC RX MED GY IP 250 OP 636 PS 637: Performed by: INTERNAL MEDICINE

## 2023-11-18 DEVICE — COTTON-LEUNG BILIARY STENT
Type: IMPLANTABLE DEVICE | Site: MOUTH | Status: FUNCTIONAL
Brand: COTTON-LEUNG

## 2023-11-18 RX ORDER — VITAMIN B COMPLEX
1 TABLET ORAL DAILY
COMMUNITY

## 2023-11-18 RX ORDER — POLYETHYLENE GLYCOL 3350 17 G/17G
17 POWDER, FOR SOLUTION ORAL DAILY
Status: DISCONTINUED | OUTPATIENT
Start: 2023-11-18 | End: 2023-11-20 | Stop reason: HOSPADM

## 2023-11-18 RX ORDER — GABAPENTIN 300 MG/1
600 CAPSULE ORAL 2 TIMES DAILY
Status: DISCONTINUED | OUTPATIENT
Start: 2023-11-18 | End: 2023-11-20 | Stop reason: HOSPADM

## 2023-11-18 RX ORDER — FENTANYL CITRATE 50 UG/ML
INJECTION, SOLUTION INTRAMUSCULAR; INTRAVENOUS PRN
Status: DISCONTINUED | OUTPATIENT
Start: 2023-11-18 | End: 2023-11-18

## 2023-11-18 RX ORDER — DEXAMETHASONE SODIUM PHOSPHATE 4 MG/ML
INJECTION, SOLUTION INTRA-ARTICULAR; INTRALESIONAL; INTRAMUSCULAR; INTRAVENOUS; SOFT TISSUE PRN
Status: DISCONTINUED | OUTPATIENT
Start: 2023-11-18 | End: 2023-11-18

## 2023-11-18 RX ORDER — LEVOTHYROXINE SODIUM 50 UG/1
50 TABLET ORAL
Status: DISCONTINUED | OUTPATIENT
Start: 2023-11-19 | End: 2023-11-20 | Stop reason: HOSPADM

## 2023-11-18 RX ORDER — PANTOPRAZOLE SODIUM 40 MG/1
40 TABLET, DELAYED RELEASE ORAL DAILY
Status: DISCONTINUED | OUTPATIENT
Start: 2023-11-18 | End: 2023-11-20 | Stop reason: HOSPADM

## 2023-11-18 RX ORDER — ONDANSETRON 4 MG/1
4 TABLET, ORALLY DISINTEGRATING ORAL EVERY 6 HOURS PRN
Status: DISCONTINUED | OUTPATIENT
Start: 2023-11-18 | End: 2023-11-20 | Stop reason: HOSPADM

## 2023-11-18 RX ORDER — LIDOCAINE HYDROCHLORIDE 20 MG/ML
INJECTION, SOLUTION INFILTRATION; PERINEURAL PRN
Status: DISCONTINUED | OUTPATIENT
Start: 2023-11-18 | End: 2023-11-18

## 2023-11-18 RX ORDER — NALOXONE HYDROCHLORIDE 0.4 MG/ML
0.4 INJECTION, SOLUTION INTRAMUSCULAR; INTRAVENOUS; SUBCUTANEOUS
Status: DISCONTINUED | OUTPATIENT
Start: 2023-11-18 | End: 2023-11-20 | Stop reason: HOSPADM

## 2023-11-18 RX ORDER — FLUMAZENIL 0.1 MG/ML
0.2 INJECTION, SOLUTION INTRAVENOUS
Status: ACTIVE | OUTPATIENT
Start: 2023-11-18 | End: 2023-11-19

## 2023-11-18 RX ORDER — DEXTROSE MONOHYDRATE 25 G/50ML
25-50 INJECTION, SOLUTION INTRAVENOUS
Status: DISCONTINUED | OUTPATIENT
Start: 2023-11-18 | End: 2023-11-20 | Stop reason: HOSPADM

## 2023-11-18 RX ORDER — AMOXICILLIN 250 MG
2 CAPSULE ORAL 2 TIMES DAILY PRN
Status: DISCONTINUED | OUTPATIENT
Start: 2023-11-18 | End: 2023-11-18

## 2023-11-18 RX ORDER — HYDROMORPHONE HCL IN WATER/PF 6 MG/30 ML
0.4 PATIENT CONTROLLED ANALGESIA SYRINGE INTRAVENOUS
Status: DISCONTINUED | OUTPATIENT
Start: 2023-11-18 | End: 2023-11-18

## 2023-11-18 RX ORDER — METFORMIN HCL 500 MG
1000 TABLET, EXTENDED RELEASE 24 HR ORAL 2 TIMES DAILY WITH MEALS
COMMUNITY

## 2023-11-18 RX ORDER — LANOLIN ALCOHOL/MO/W.PET/CERES
1000 CREAM (GRAM) TOPICAL DAILY
COMMUNITY

## 2023-11-18 RX ORDER — NICOTINE POLACRILEX 4 MG
15-30 LOZENGE BUCCAL
Status: DISCONTINUED | OUTPATIENT
Start: 2023-11-18 | End: 2023-11-20 | Stop reason: HOSPADM

## 2023-11-18 RX ORDER — HYDRALAZINE HYDROCHLORIDE 20 MG/ML
10 INJECTION INTRAMUSCULAR; INTRAVENOUS EVERY 4 HOURS PRN
Status: DISCONTINUED | OUTPATIENT
Start: 2023-11-18 | End: 2023-11-20 | Stop reason: HOSPADM

## 2023-11-18 RX ORDER — FAMOTIDINE 20 MG/1
40 TABLET, FILM COATED ORAL 2 TIMES DAILY PRN
Status: DISCONTINUED | OUTPATIENT
Start: 2023-11-18 | End: 2023-11-20 | Stop reason: HOSPADM

## 2023-11-18 RX ORDER — AMOXICILLIN 250 MG
1 CAPSULE ORAL 2 TIMES DAILY
Status: DISCONTINUED | OUTPATIENT
Start: 2023-11-18 | End: 2023-11-20 | Stop reason: HOSPADM

## 2023-11-18 RX ORDER — SODIUM CHLORIDE, SODIUM LACTATE, POTASSIUM CHLORIDE, CALCIUM CHLORIDE 600; 310; 30; 20 MG/100ML; MG/100ML; MG/100ML; MG/100ML
INJECTION, SOLUTION INTRAVENOUS CONTINUOUS
Status: DISCONTINUED | OUTPATIENT
Start: 2023-11-18 | End: 2023-11-19

## 2023-11-18 RX ORDER — LIDOCAINE 40 MG/G
CREAM TOPICAL
Status: DISCONTINUED | OUTPATIENT
Start: 2023-11-18 | End: 2023-11-20 | Stop reason: HOSPADM

## 2023-11-18 RX ORDER — ONDANSETRON 2 MG/ML
INJECTION INTRAMUSCULAR; INTRAVENOUS PRN
Status: DISCONTINUED | OUTPATIENT
Start: 2023-11-18 | End: 2023-11-18

## 2023-11-18 RX ORDER — KETOROLAC TROMETHAMINE 15 MG/ML
15 INJECTION, SOLUTION INTRAMUSCULAR; INTRAVENOUS EVERY 6 HOURS
Status: DISCONTINUED | OUTPATIENT
Start: 2023-11-18 | End: 2023-11-18

## 2023-11-18 RX ORDER — KETOROLAC TROMETHAMINE 15 MG/ML
15 INJECTION, SOLUTION INTRAMUSCULAR; INTRAVENOUS EVERY 6 HOURS PRN
Status: DISCONTINUED | OUTPATIENT
Start: 2023-11-18 | End: 2023-11-20 | Stop reason: HOSPADM

## 2023-11-18 RX ORDER — ASCORBIC ACID 500 MG
500 TABLET ORAL DAILY
COMMUNITY

## 2023-11-18 RX ORDER — HYDROMORPHONE HCL IN WATER/PF 6 MG/30 ML
0.4 PATIENT CONTROLLED ANALGESIA SYRINGE INTRAVENOUS
Status: DISCONTINUED | OUTPATIENT
Start: 2023-11-18 | End: 2023-11-20 | Stop reason: HOSPADM

## 2023-11-18 RX ORDER — SODIUM CHLORIDE 9 MG/ML
INJECTION, SOLUTION INTRAVENOUS CONTINUOUS
Status: DISCONTINUED | OUTPATIENT
Start: 2023-11-18 | End: 2023-11-18

## 2023-11-18 RX ORDER — ONDANSETRON 2 MG/ML
4 INJECTION INTRAMUSCULAR; INTRAVENOUS EVERY 6 HOURS PRN
Status: DISCONTINUED | OUTPATIENT
Start: 2023-11-18 | End: 2023-11-18

## 2023-11-18 RX ORDER — ONDANSETRON 4 MG/1
4 TABLET, ORALLY DISINTEGRATING ORAL EVERY 6 HOURS PRN
Status: DISCONTINUED | OUTPATIENT
Start: 2023-11-18 | End: 2023-11-18

## 2023-11-18 RX ORDER — FAMOTIDINE 20 MG/1
20 TABLET, FILM COATED ORAL 2 TIMES DAILY
Status: DISCONTINUED | OUTPATIENT
Start: 2023-11-18 | End: 2023-11-18

## 2023-11-18 RX ORDER — AMOXICILLIN 250 MG
2 CAPSULE ORAL 2 TIMES DAILY
Status: DISCONTINUED | OUTPATIENT
Start: 2023-11-18 | End: 2023-11-20 | Stop reason: HOSPADM

## 2023-11-18 RX ORDER — LIDOCAINE 50 MG/G
PATCH TOPICAL DAILY PRN
COMMUNITY
End: 2024-07-13

## 2023-11-18 RX ORDER — NALOXONE HYDROCHLORIDE 0.4 MG/ML
0.2 INJECTION, SOLUTION INTRAMUSCULAR; INTRAVENOUS; SUBCUTANEOUS
Status: DISCONTINUED | OUTPATIENT
Start: 2023-11-18 | End: 2023-11-20 | Stop reason: HOSPADM

## 2023-11-18 RX ORDER — PIPERACILLIN SODIUM, TAZOBACTAM SODIUM 3; .375 G/15ML; G/15ML
3.38 INJECTION, POWDER, LYOPHILIZED, FOR SOLUTION INTRAVENOUS EVERY 6 HOURS
Status: DISCONTINUED | OUTPATIENT
Start: 2023-11-18 | End: 2023-11-20 | Stop reason: HOSPADM

## 2023-11-18 RX ORDER — AMOXICILLIN 250 MG
1 CAPSULE ORAL 2 TIMES DAILY PRN
Status: DISCONTINUED | OUTPATIENT
Start: 2023-11-18 | End: 2023-11-18

## 2023-11-18 RX ORDER — ONDANSETRON 2 MG/ML
4 INJECTION INTRAMUSCULAR; INTRAVENOUS EVERY 6 HOURS PRN
Status: DISCONTINUED | OUTPATIENT
Start: 2023-11-18 | End: 2023-11-20 | Stop reason: HOSPADM

## 2023-11-18 RX ORDER — SENNOSIDES A AND B 8.6 MG/1
2 TABLET, FILM COATED ORAL AT BEDTIME
Status: DISCONTINUED | OUTPATIENT
Start: 2023-11-18 | End: 2023-11-18

## 2023-11-18 RX ORDER — NITROGLYCERIN 0.4 MG/1
0.4 TABLET SUBLINGUAL EVERY 5 MIN PRN
Status: DISCONTINUED | OUTPATIENT
Start: 2023-11-18 | End: 2023-11-20 | Stop reason: HOSPADM

## 2023-11-18 RX ORDER — PROPOFOL 10 MG/ML
INJECTION, EMULSION INTRAVENOUS CONTINUOUS PRN
Status: DISCONTINUED | OUTPATIENT
Start: 2023-11-18 | End: 2023-11-18

## 2023-11-18 RX ORDER — CALCIUM CARBONATE 500 MG/1
1000 TABLET, CHEWABLE ORAL 4 TIMES DAILY PRN
Status: DISCONTINUED | OUTPATIENT
Start: 2023-11-18 | End: 2023-11-20 | Stop reason: HOSPADM

## 2023-11-18 RX ADMIN — SODIUM CHLORIDE, POTASSIUM CHLORIDE, SODIUM LACTATE AND CALCIUM CHLORIDE: 600; 310; 30; 20 INJECTION, SOLUTION INTRAVENOUS at 14:40

## 2023-11-18 RX ADMIN — PIPERACILLIN AND TAZOBACTAM 3.38 G: 3; .375 INJECTION, POWDER, FOR SOLUTION INTRAVENOUS at 11:36

## 2023-11-18 RX ADMIN — PANTOPRAZOLE SODIUM 40 MG: 40 TABLET, DELAYED RELEASE ORAL at 14:40

## 2023-11-18 RX ADMIN — DOCUSATE SODIUM 50 MG AND SENNOSIDES 8.6 MG 1 TABLET: 8.6; 5 TABLET, FILM COATED ORAL at 09:05

## 2023-11-18 RX ADMIN — INSULIN ASPART 3 UNITS: 100 INJECTION, SOLUTION INTRAVENOUS; SUBCUTANEOUS at 17:18

## 2023-11-18 RX ADMIN — ONDANSETRON 4 MG: 2 INJECTION INTRAMUSCULAR; INTRAVENOUS at 11:52

## 2023-11-18 RX ADMIN — FENTANYL CITRATE 50 MCG: 50 INJECTION INTRAMUSCULAR; INTRAVENOUS at 11:48

## 2023-11-18 RX ADMIN — PROPOFOL 150 MCG/KG/MIN: 10 INJECTION, EMULSION INTRAVENOUS at 11:50

## 2023-11-18 RX ADMIN — LIDOCAINE HYDROCHLORIDE 40 MG: 20 INJECTION, SOLUTION INFILTRATION; PERINEURAL at 11:52

## 2023-11-18 RX ADMIN — KETOROLAC TROMETHAMINE 15 MG: 15 INJECTION, SOLUTION INTRAMUSCULAR; INTRAVENOUS at 05:04

## 2023-11-18 RX ADMIN — DOCUSATE SODIUM 50 MG AND SENNOSIDES 8.6 MG 2 TABLET: 8.6; 5 TABLET, FILM COATED ORAL at 21:26

## 2023-11-18 RX ADMIN — DEXAMETHASONE SODIUM PHOSPHATE 4 MG: 4 INJECTION, SOLUTION INTRA-ARTICULAR; INTRALESIONAL; INTRAMUSCULAR; INTRAVENOUS; SOFT TISSUE at 11:52

## 2023-11-18 RX ADMIN — PIPERACILLIN AND TAZOBACTAM 3.38 G: 3; .375 INJECTION, POWDER, FOR SOLUTION INTRAVENOUS at 23:58

## 2023-11-18 RX ADMIN — PIPERACILLIN AND TAZOBACTAM 3.38 G: 3; .375 INJECTION, POWDER, FOR SOLUTION INTRAVENOUS at 05:01

## 2023-11-18 RX ADMIN — SODIUM CHLORIDE: 9 INJECTION, SOLUTION INTRAVENOUS at 05:37

## 2023-11-18 RX ADMIN — PIPERACILLIN AND TAZOBACTAM 3.38 G: 3; .375 INJECTION, POWDER, FOR SOLUTION INTRAVENOUS at 17:24

## 2023-11-18 RX ADMIN — GABAPENTIN 600 MG: 300 CAPSULE ORAL at 21:26

## 2023-11-18 ASSESSMENT — LIFESTYLE VARIABLES: TOBACCO_USE: 1

## 2023-11-18 ASSESSMENT — ENCOUNTER SYMPTOMS: DYSRHYTHMIAS: 1

## 2023-11-18 ASSESSMENT — ACTIVITIES OF DAILY LIVING (ADL)
ADLS_ACUITY_SCORE: 35
ADLS_ACUITY_SCORE: 41
ADLS_ACUITY_SCORE: 41
ADLS_ACUITY_SCORE: 35
ADLS_ACUITY_SCORE: 35
ADLS_ACUITY_SCORE: 41
ADLS_ACUITY_SCORE: 41
ADLS_ACUITY_SCORE: 35
ADLS_ACUITY_SCORE: 41
ADLS_ACUITY_SCORE: 41

## 2023-11-18 NOTE — ANESTHESIA PREPROCEDURE EVALUATION
Anesthesia Pre-Procedure Evaluation    Patient: Brannon Boggs   MRN: [id number] : 1940        Procedure : Procedure(s):  ENDOSCOPIC RETROGRADE CHOLANGIOPANCREATOGRAPHY, WITH TUBE, STENT, OR FOREIGN BODY REMOVAL          History reviewed. No pertinent past medical history.   History reviewed. No pertinent surgical history.   No Known Allergies   Social History     Tobacco Use    Smoking status: Former    Smokeless tobacco: Never   Substance Use Topics    Alcohol use: Not Currently      Wt Readings from Last 1 Encounters:   10/06/23 104.3 kg (230 lb)        Anesthesia Evaluation            ROS/MED HX  ENT/Pulmonary:     (+)                tobacco use, Past use,                      Neurologic:     (+)   dementia,                             Cardiovascular:     (+) Dyslipidemia hypertension- -  CAD -  - -              pacemaker, Reason placed: SSS, AV block.         dysrhythmias, a-fib,             METS/Exercise Tolerance:     Hematologic: Comments: CLL      Musculoskeletal:       GI/Hepatic:     (+) GERD,            liver disease (transaminitis),       Renal/Genitourinary:       Endo:     (+)  type II DM,       Diabetic complications: neuropathy. thyroid problem, hypothyroidism,    Obesity,       Psychiatric/Substance Use:       Infectious Disease:       Malignancy:       Other:            Physical Exam    Airway        Mallampati: II   TM distance: > 3 FB   Neck ROM: full   Mouth opening: > 3 cm    Respiratory Devices and Support         Dental  no notable dental history     (+) Modest Abnormalities - crowns, retainers, 1 or 2 missing teeth      Cardiovascular          Rhythm and rate: regular and normal     Pulmonary   pulmonary exam normal                OUTSIDE LABS:  CBC:   Lab Results   Component Value Date    WBC 12.6 (H) 2023    WBC 12.6 (H) 2023    HGB 13.7 2023    HGB 13.6 2023    HCT 41.4 2023    HCT 41.8 2023     2023     2023      BMP:   Lab Results   Component Value Date     11/18/2023     (L) 11/17/2023    POTASSIUM 4.2 11/18/2023    POTASSIUM 4.5 11/17/2023    CHLORIDE 102 11/18/2023    CHLORIDE 96 (L) 11/17/2023    CO2 27 11/18/2023    CO2 24 11/17/2023    BUN 11.0 11/18/2023    BUN 11.8 11/17/2023    CR 1.01 11/18/2023    CR 0.88 11/17/2023     (H) 11/18/2023     (H) 11/17/2023     COAGS:   Lab Results   Component Value Date    PTT 29 11/18/2023    INR 1.02 11/18/2023    FIBR 432 11/18/2023     POC:   Lab Results   Component Value Date     (H) 02/13/2021     HEPATIC:   Lab Results   Component Value Date    ALBUMIN 4.2 11/18/2023    PROTTOTAL 7.3 11/18/2023     (HH) 11/18/2023     (HH) 11/18/2023    ALKPHOS 675 (H) 11/18/2023    BILITOTAL 1.9 (H) 11/18/2023     OTHER:   Lab Results   Component Value Date    LACT 1.7 11/17/2023    A1C 6.8 (H) 10/13/2022    EMILIE 9.4 11/18/2023    PHOS 3.9 11/18/2023    MAG 1.6 (L) 11/18/2023    LIPASE 26 11/17/2023    TSH 4.28 (H) 02/12/2021    T4 0.84 02/12/2021    CRP <2.9 05/19/2021       Anesthesia Plan    ASA Status:  3    NPO Status:  NPO Appropriate    Anesthesia Type: MAC.     - Reason for MAC: straight local not clinically adequate, chronic cardiopulmonary disease              Consents    Anesthesia Plan(s) and associated risks, benefits, and realistic alternatives discussed. Questions answered and patient/representative(s) expressed understanding.     - Discussed:     - Discussed with:  Patient            Postoperative Care    Pain management: IV analgesics.        Comments:                Francisco Javier Napier MD

## 2023-11-18 NOTE — PROGRESS NOTES
Patient admitted 5 AM this morning with acute cholangitis, choledocholithiasis.  ERCP is being planned for today.  -Continue IV Luana Leonardo MD on 11/18/2023 at 2:05 PM

## 2023-11-18 NOTE — ANESTHESIA CARE TRANSFER NOTE
Patient: Brannon Boggs    Procedure: Procedure(s):  ENDOSCOPIC RETROGRADE CHOLANGIOPANCREATOGRAPHY, WITH TUBE, STENT, OR FOREIGN BODY REMOVAL       Diagnosis: Cholangitis (H28) [K83.09]  Diagnosis Additional Information: No value filed.    Anesthesia Type:   MAC     Note:    Oropharynx: oropharynx clear of all foreign objects and spontaneously breathing  Level of Consciousness: awake  Oxygen Supplementation: nasal cannula  Level of Supplemental Oxygen (L/min / FiO2): 4  Independent Airway: airway patency satisfactory and stable    Vital Signs Stable: post-procedure vital signs reviewed and stable  Report to RN Given: handoff report given  Patient transferred to: PACU    Handoff Report: Identifed the Patient, Identified the Reponsible Provider, Reviewed the pertinent medical history, Discussed the surgical course, Reviewed Intra-OP anesthesia mangement and issues during anesthesia, Set expectations for post-procedure period and Allowed opportunity for questions and acknowledgement of understanding      Vitals:  Vitals Value Taken Time   BP     Temp     Pulse 64 11/18/23 1218   Resp 12 11/18/23 1218   SpO2 96 % 11/18/23 1218   Vitals shown include unfiled device data.    Electronically Signed By: LELA Adan CRNA  November 18, 2023  12:19 PM

## 2023-11-18 NOTE — CONSULTS
[facility]  Gastroenterology Consultation         Brannon Boggs  [address] [facility] [address]  83 year old male    Admission Date/Time: 11/18/2023  Primary Care Provider: [place], [facility]  Referring / Attending Physician:  Mitesh     We were asked to see the patient in consultation by  to evaluation of cholangitis.      CC: Cholangitis    HPI:  Brannon Boggs is a 83 year old male who was transferred from [facility] due to sepsis cholangitis patient has elevated lactic acid elevated liver function test patient presented here with acute onset of abdominal pain most of the information was obtained by discussing with patient's family.  Patient was found to have elevated white blood cell count along with AST ALT significantly elevated ALT was 70 midrange AST 1000 and alkaline phosphatase 726 bilirubin of 1.5.  Patient was started on antibiotics patient was transferred.  Patient is doing fairly better patient is hemodynamically stable patient is denying any further abdominal pain denying any other significant systemic complaints.  Patient is sitting up in the recliner.  Most of the information was obtained by reviewing the chart discussing with the family and patient.  No other significant systemic complaints rest of review of system is negative.    ROS: A comprehensive ten point review of systems was negative aside from those in mentioned in the HPI.      PAST MED HX:  I have reviewed this patient's medical history and updated it with pertinent information if needed.   No past medical history on file.    MEDICATIONS:   Prior to Admission Medications   Prescriptions Last Dose Informant Patient Reported? Taking?   ORDER FOR DME  Spouse/Significant Other No No   Sig: Equipment being ordered: Walker   Psyllium (NATURAL FIBER THERAPY) 48.57 % POWD  Spouse/Significant Other Yes No   Sig: Take 3 tsp by mouth daily as needed (constipation)   Vitamin D3  (CHOLECALCIFEROL) 125 MCG (5000 UT) tablet   Yes No   Sig: Take 125 mcg by mouth daily   acetaminophen (TYLENOL) 500 MG tablet  Spouse/Significant Other Yes No   Sig: Take 1,000 mg by mouth every 6 hours as needed for pain   ascorbic acid 1000 MG TABS tablet   Yes No   Sig: Take 1,000 mg by mouth daily   aspirin (ASA) 81 MG EC tablet  Spouse/Significant Other Yes No   Sig: Take 81 mg by mouth daily   atorvastatin (LIPITOR) 40 MG tablet  Spouse/Significant Other Yes No   Sig: Take 40 mg by mouth At Bedtime   blood glucose (ACCU-CHEK MAGGIE PLUS) test strip  Spouse/Significant Other Yes No   Si strip by In Vitro route daily   blood glucose monitoring (SOFTCLIX) lancets  Spouse/Significant Other Yes No   Si each by In Vitro route daily   famotidine (PEPCID) 20 MG tablet  Spouse/Significant Other No No   Sig: Take 2 tablets (40 mg) by mouth At Bedtime   Patient taking differently: Take 20 mg by mouth 3 times daily as needed (heart burn)   gabapentin (NEURONTIN) 300 MG capsule  Spouse/Significant Other Yes No   Sig: Take 600 mg by mouth 2 times daily Morning and bedtime   insulin glargine (LANTUS PEN) 100 UNIT/ML pen  Spouse/Significant Other Yes No   Sig: Inject 32 Units Subcutaneous every morning    insulin pen needle (31G X 8 MM) 31G X 8 MM miscellaneous  Spouse/Significant Other Yes No   Sig: Inject 1 each Subcutaneous daily For lantus   levothyroxine (SYNTHROID/LEVOTHROID) 50 MCG tablet  Spouse/Significant Other Yes No   Sig: Take 50 mcg by mouth daily before breakfast   metFORMIN (GLUCOPHAGE) 500 MG tablet  Spouse/Significant Other Yes No   Sig: Take 1,000 mg by mouth 2 times daily (with meals)   nitroGLYcerin (NITROSTAT) 0.4 MG sublingual tablet  Spouse/Significant Other Yes No   Sig: Place 0.4 mg under the tongue every 5 minutes as needed for chest pain   pantoprazole (PROTONIX) 40 MG EC tablet  Spouse/Significant Other Yes No   Sig: Take 40 mg by mouth daily 1/2 hour before eating   senna (SENOKOT) 8.6 MG  tablet  Spouse/Significant Other Yes No   Sig: Take 17.2 mg by mouth At Bedtime   vitamin B-12 (CYANOCOBALAMIN) 1000 MCG tablet  Spouse/Significant Other Yes No   Sig: Take 1,000 mcg by mouth daily   zinc sulfate (ZINCATE) 220 (50 Zn) MG capsule   Yes No   Sig: Take 50 mg by mouth daily      Facility-Administered Medications: None       ALLERGIES: No Known Allergies    SOCIAL HISTORY:  Social History     Tobacco Use     Smoking status: Former     Smokeless tobacco: Never   Vaping Use     Vaping Use: Never used   Substance Use Topics     Alcohol use: Not Currently     Drug use: Not Currently       FAMILY HISTORY:  No family history on file.    PHYSICAL EXAM:   General awake alert comfortable  Vital Signs with Ranges  Temp: 98.3  F (36.8  C) Temp src: Oral BP: (!) 151/68 Pulse: 61   Resp: 18 SpO2: 95 % O2 Device: None (Room air)    No intake/output data recorded.    Cardiovascular: negative, PMI normal. No lifts, heaves, or thrills. RRR. No murmurs, clicks gallops or rub  Respiratory: negative, Percussion normal. Good diaphragmatic excursion. Lungs clear  Head: Normocephalic. No masses, lesions, tenderness or abnormalities  Neck: Neck supple. No adenopathy. Thyroid symmetric, normal size,, Carotids without bruits.  Abdomen: Abdomen soft, non-tender. BS normal. No masses, organomegaly  SKIN: no suspicious lesions or rashes          ADDITIONAL COMMENTS:   I reviewed the patient's new clinical lab test results.   Recent Labs   Lab Test 11/18/23  0850 11/17/23 1937 12/16/22  1201 05/30/22  1025 06/01/21  1157 05/19/21  2114   WBC 12.6* 12.6* 15.9*   < > 19.6* 17.3*   HGB 13.7 13.6 13.9   < > 14.1 13.9   MCV 91 92 92   < > 92 92    180 167   < > 178 176   INR 1.02  --   --   --  1.07 0.98    < > = values in this interval not displayed.     Recent Labs   Lab Test 11/17/23 1937 12/16/22  1201 05/30/22  1025   POTASSIUM 4.5 4.5 4.1   CHLORIDE 96* 104 108   CO2 24 25 25   BUN 11.8 16 14   ANIONGAP 13 6 6     Recent  Labs   Lab Test 11/17/23  2231 11/17/23  1937 11/17/23  1837 10/06/23  1632 05/19/21  2114 05/18/21  2022 02/12/21  0541 02/11/21  1752   ALBUMIN  --  4.0  --   --  3.7 3.6   < >  --    BILITOTAL  --  1.5*  --   --  0.4 0.4   < >  --    ALT  --  744*  --   --  33 19   < >  --    AST  --  1,013*  --   --  41 14   < >  --    PROTEIN  --   --  Negative 100*  --   --   --  Negative   LIPASE 26  --   --   --  105  --   --   --     < > = values in this interval not displayed.       I reviewed the patient's new imaging results.        CONSULTATION ASSESSMENT AND PLAN:    Principal Problem:    Cholangitis (H28)    Assessment: Very pleasant 83-year-old gentleman with significantly elevated liver function tests abdominal pain right upper quadrant pain sepsis who was transferred from [facility] patient's findings are suggestive of acute ascending cholangitis.  Patient is currently on antibiotics.  Patient is status postcholecystectomy imaging studies are consistent with retained common bile duct stones.  Patient findings are consistent with biliary obstruction along with cholangitis plan to proceed with ERCP today continue on current treatment plan IV antibiotics n.p.o.  Risk-benefit plan discussed in detail with patient and family.  Thank you very much for letting me participate in his care.                  Jose Morse MD, [facility]  [facility].  Office: [phone]  Cell : [phone]      [facility]  Ph: [phone] Fax: [phone]                      83M hx of CLL, GERD, T2D, Obesity, CAD, HFpEF, HTN ,HLD, ?Afib, AV block s/p pacemaker transferred from [facility].  Labs: 12.6 WBC, AST ALT 1000/744, Alk 726, Tbili 1.5, LA 2.2>resolved  ``Imaging: RUQ wnl, CT showing esophageal reflux, diverticulosis, and Dilated common bile duct with multiple filling defects in the distal CBD consistent with stones.   NPO  I/V Antibiotics  ERCP today.    Jose Morse MD [facility]   [facility]

## 2023-11-18 NOTE — PHARMACY-ADMISSION MEDICATION HISTORY
Pharmacy Intern Admission Medication History    Admission medication history is complete. The information provided in this note is only as accurate as the sources available at the time of the update.    Information Source(s): Family member and CareEverywhere/SureScripts via phone    Pertinent Information: None    Changes made to PTA medication list:  Added: Lidocaine 5% patch   Deleted: None  Changed: Vitamin D 125 mcg to 25 mcg, Metformin IR to ER     Medication Affordability:  Not including over the counter (OTC) medications, was there a time in the past 3 months when you did not take your medications as prescribed because of cost?: No    Allergies reviewed with patient and updates made in EHR:  Allergies reviewed by ED nurse upon admit     Medication History Completed By: Tita Stokes 11/18/2023 12:02 PM    PTA Med List   Medication Sig Last Dose    acetaminophen (TYLENOL) 500 MG tablet Take 1,000 mg by mouth every 6 hours as needed for pain Past Week at PRN    aspirin (ASA) 81 MG EC tablet Take 81 mg by mouth daily 11/17/2023 at AM    atorvastatin (LIPITOR) 40 MG tablet Take 40 mg by mouth At Bedtime 11/17/2023    cyanocobalamin (VITAMIN B-12) 1000 MCG tablet Take 1,000 mcg by mouth daily 11/17/2023 at AM    famotidine (PEPCID) 20 MG tablet Take 2 tablets (40 mg) by mouth At Bedtime (Patient taking differently: Take 20 mg by mouth 3 times daily as needed (heart burn)) 11/17/2023 at 1200    gabapentin (NEURONTIN) 300 MG capsule Take 600 mg by mouth 2 times daily Morning and bedtime 11/17/2023 at AM x1    insulin glargine (LANTUS PEN) 100 UNIT/ML pen Inject 32 Units Subcutaneous every morning  11/17/2023 at AM    levothyroxine (SYNTHROID/LEVOTHROID) 50 MCG tablet Take 50 mcg by mouth daily before breakfast 11/17/2023 at AM    lidocaine (LIDODERM) 5 % patch Place onto the skin daily as needed for moderate pain (Shoulder pain) Past Week    metFORMIN (GLUCOPHAGE XR) 500 MG 24 hr tablet Take 1,000 mg by mouth 2  times daily (with meals)     nitroGLYcerin (NITROSTAT) 0.4 MG sublingual tablet Place 0.4 mg under the tongue every 5 minutes as needed for chest pain Unknown at PRN    pantoprazole (PROTONIX) 40 MG EC tablet Take 40 mg by mouth daily 1/2 hour before eating 11/17/2023 at AM    Psyllium (NATURAL FIBER THERAPY) 48.57 % POWD Take 3 tsp by mouth daily as needed (constipation) Past Month at PRN    senna (SENOKOT) 8.6 MG tablet Take 17.2 mg by mouth At Bedtime 11/16/2023 at HS    vitamin C (ASCORBIC ACID) 500 MG tablet Take 500 mg by mouth daily 11/17/2023 at AM    Vitamin D3 (CHOLECALCIFEROL) 25 mcg (1000 units) tablet Take 1 tablet by mouth daily 11/17/2023 at AM    zinc sulfate (ZINCATE) 220 (50 Zn) MG capsule Take 50 mg by mouth daily 11/17/2023 at AM

## 2023-11-18 NOTE — MEDICATION SCRIBE - ADMISSION MEDICATION HISTORY
Medication Scribe Admission Medication History    Admission medication history is complete. The information provided in this note is only as accurate as the sources available at the time of the update.    Information Source(s): Patient, Family member, and CareEverywhere/SureScripts via in-person    Pertinent Information: spouse Rossana vizcaino meds    Changes made to PTA medication list:  Added: None  Deleted: None  Changed: pepcid dose and directions    Medication Affordability:  Not including over the counter (OTC) medications, was there a time in the past 3 months when you did not take your medications as prescribed because of cost?: No    Allergies reviewed with patient and updates made in EHR: yes    Medication History Completed By: OLINDA LACY 11/17/2023 8:17 PM    PTA Med List   Medication Sig Last Dose    acetaminophen (TYLENOL) 500 MG tablet Take 1,000 mg by mouth every 6 hours as needed for pain Past Week at unkn    ascorbic acid 1000 MG TABS tablet Take 1,000 mg by mouth daily 11/17/2023 at am    aspirin (ASA) 81 MG EC tablet Take 81 mg by mouth daily 11/17/2023 at am    atorvastatin (LIPITOR) 40 MG tablet Take 40 mg by mouth At Bedtime 11/16/2023 at hs    blood glucose (ACCU-CHEK MAGGIE PLUS) test strip 1 strip by In Vitro route daily 11/17/2023 at am    blood glucose monitoring (SOFTCLIX) lancets 1 each by In Vitro route daily 11/17/2023 at am    famotidine (PEPCID) 20 MG tablet Take 2 tablets (40 mg) by mouth At Bedtime (Patient taking differently: Take 20 mg by mouth 3 times daily as needed (heart burn)) 11/17/2023 at 1200    gabapentin (NEURONTIN) 300 MG capsule Take 600 mg by mouth 2 times daily Morning and bedtime 11/17/2023 at am    insulin glargine (LANTUS PEN) 100 UNIT/ML pen Inject 32 Units Subcutaneous every morning  11/17/2023 at am    insulin pen needle (31G X 8 MM) 31G X 8 MM miscellaneous Inject 1 each Subcutaneous daily For lantus 11/17/2023 at am    levothyroxine (SYNTHROID/LEVOTHROID) 50  MCG tablet Take 50 mcg by mouth daily before breakfast 11/17/2023 at am    metFORMIN (GLUCOPHAGE) 500 MG tablet Take 1,000 mg by mouth 2 times daily (with meals) 11/17/2023 at am    nitroGLYcerin (NITROSTAT) 0.4 MG sublingual tablet Place 0.4 mg under the tongue every 5 minutes as needed for chest pain More than a month at on hand    ORDER FOR DME Equipment being ordered: Walker 11/17/2023 at in car    pantoprazole (PROTONIX) 40 MG EC tablet Take 40 mg by mouth daily 1/2 hour before eating 11/17/2023 at am    Psyllium (NATURAL FIBER THERAPY) 48.57 % POWD Take 3 tsp by mouth daily as needed (constipation) 11/17/2023 at am    senna (SENOKOT) 8.6 MG tablet Take 17.2 mg by mouth At Bedtime 11/16/2023 at hs    vitamin B-12 (CYANOCOBALAMIN) 1000 MCG tablet Take 1,000 mcg by mouth daily 11/17/2023 at am    Vitamin D3 (CHOLECALCIFEROL) 125 MCG (5000 UT) tablet Take 125 mcg by mouth daily 11/17/2023 at am    zinc sulfate (ZINCATE) 220 (50 Zn) MG capsule Take 50 mg by mouth daily 11/17/2023 at am

## 2023-11-18 NOTE — ED PROVIDER NOTES
History     Chief Complaint   Patient presents with    Fever     HPI  Brannon Boggs is a 83 year old male who presents to the ED with his wife, Rossana, with fever and chills that started this afternoon after getting up from a nap at about 4 PM.  He was fine when he went to bed in the afternoon.  Prolongation determined he DEC to 74 in the house and 3 blankets and is still shivering.  He denies any chest pain cough or shortness of breath.  Maybe some slight dysuria.  No nausea vomiting or diarrhea.  Has not had much to eat or drink today because he has not felt well.  Overall feeling much better now than he was earlier.  Has some intermittent abdominal discomfort for which he takes Pepcid.  He took that today and seems to have helped.  Wife states that he does have some dementia and his mentation seems to be at its baseline.    She states that he takes metformin and also insulin for his diabetes.  Denies being on any blood thinners.  Denies any history of CHF.    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  Pers. Hx of exposure to agent orange      Hypercholesteremia 04/06/2011     Priority: Medium    Essential hypertension 07/19/2005     Priority: Medium     Formatting of this note might be different from the original.  Borderline  IMO Update          Past Medical History:    No past medical history on file.    Past Surgical History:    No past surgical history on file.    Family History:    No family history on file.    Social History:  Marital Status:   [2]  Social History     Tobacco Use    Smoking status: Former    Smokeless tobacco: Never   Vaping Use    Vaping Use: Never used   Substance Use Topics    Alcohol use: Not Currently    Drug use: Not Currently        Medications:    acetaminophen (TYLENOL) 500 MG tablet  ascorbic acid 1000 MG TABS tablet  aspirin (ASA) 81 MG EC tablet  atorvastatin (LIPITOR) 40 MG tablet  blood glucose (ACCU-CHEK MAGGIE PLUS) test strip  blood glucose monitoring (SOFTCLIX) lancets  famotidine (PEPCID) 20 MG tablet  gabapentin (NEURONTIN) 300 MG capsule  insulin glargine (LANTUS PEN) 100 UNIT/ML pen  insulin pen needle (31G X 8 MM) 31G X 8 MM miscellaneous  levothyroxine (SYNTHROID/LEVOTHROID) 50 MCG tablet  metFORMIN (GLUCOPHAGE) 500 MG tablet  nitroGLYcerin (NITROSTAT) 0.4 MG sublingual tablet  ORDER FOR DME  pantoprazole (PROTONIX) 40 MG EC tablet  Psyllium (NATURAL FIBER THERAPY) 48.57 % POWD  senna (SENOKOT) 8.6 MG tablet  vitamin B-12 (CYANOCOBALAMIN) 1000 MCG tablet  Vitamin D3 (CHOLECALCIFEROL) 125 MCG (5000 UT) tablet  zinc sulfate (ZINCATE) 220 (50 Zn) MG capsule          Review of Systems   All other systems reviewed and are negative.      Physical Exam   BP: (!) 143/80  Pulse: 98  Temp: 100.2  F (37.9  C)  Resp: 20  SpO2: 97 %      Physical Exam  Constitutional:       General: He is not in acute distress.     Appearance: Normal appearance.   HENT:      Mouth/Throat:      Mouth: Mucous  membranes are moist.      Pharynx: Oropharynx is clear.   Eyes:      Extraocular Movements: Extraocular movements intact.   Cardiovascular:      Rate and Rhythm: Normal rate and regular rhythm.   Pulmonary:      Effort: Pulmonary effort is normal.      Breath sounds: Normal breath sounds.   Abdominal:      Palpations: Abdomen is soft.      Tenderness: There is abdominal tenderness (mild Epi). There is no right CVA tenderness or left CVA tenderness.   Musculoskeletal:         General: Normal range of motion.   Skin:     General: Skin is warm and dry.   Neurological:      General: No focal deficit present.      Mental Status: He is alert. Mental status is at baseline.         ED Course                 Procedures              Critical Care time:  none               Results for orders placed or performed during the hospital encounter of 11/17/23 (from the past 24 hour(s))   UA with Microscopic reflex to Culture    Specimen: Urine, NOS   Result Value Ref Range    Color Urine Yellow Colorless, Straw, Light Yellow, Yellow    Appearance Urine Clear Clear    Glucose Urine 150 (A) Negative mg/dL    Bilirubin Urine Negative Negative    Ketones Urine Negative Negative mg/dL    Specific Gravity Urine 1.013 1.003 - 1.035    Blood Urine Negative Negative    pH Urine 7.0 5.0 - 7.0    Protein Albumin Urine Negative Negative mg/dL    Urobilinogen Urine 4.0 (A) Normal, 2.0 mg/dL    Nitrite Urine Negative Negative    Leukocyte Esterase Urine Negative Negative    RBC Urine 5 (H) <=2 /HPF    WBC Urine 2 <=5 /HPF    Squamous Epithelials Urine <1 <=1 /HPF    Narrative    Urine Culture not indicated   Symptomatic Influenza A/B, RSV, & SARS-CoV2 PCR (COVID-19) Nose    Specimen: Nose; Swab   Result Value Ref Range    Influenza A PCR Negative Negative    Influenza B PCR Negative Negative    RSV PCR Negative Negative    SARS CoV2 PCR Negative Negative    Narrative    Testing was performed using the Xpert Xpress CoV2/Flu/RSV Assay on the [employer]  GeneXpert Instrument. This test should be ordered for the detection of SARS-CoV-2, influenza, and RSV viruses in individuals who meet clinical and/or epidemiological criteria. Test performance is unknown in asymptomatic patients. This test is for in vitro diagnostic use under the FDA EUA for laboratories certified under CLIA to perform high or moderate complexity testing. This test has not been FDA cleared or approved. A negative result does not rule out the presence of PCR inhibitors in the specimen or target RNA in concentration below the limit of detection for the assay. If only one viral target is positive but coinfection with multiple targets is suspected, the sample should be re-tested with another FDA cleared, approved, or authorized test, if coinfection would change clinical management. This test was validated by the [facility]. These laboratories are certified under the Clinical Laboratory Improvement Amendments of 1988 (CLIA-88) as qualified to perform high complexity laboratory testing.   CBC with platelets differential    Narrative    The following orders were created for panel order CBC with platelets differential.  Procedure                               Abnormality         Status                     ---------                               -----------         ------                     CBC with platelets and d...[[id number]]  Abnormal            Final result                 Please view results for these tests on the individual orders.   Comprehensive metabolic panel   Result Value Ref Range    Sodium 133 (L) 135 - 145 mmol/L    Potassium 4.5 3.4 - 5.3 mmol/L    Carbon Dioxide (CO2) 24 22 - 29 mmol/L    Anion Gap 13 7 - 15 mmol/L    Urea Nitrogen 11.8 8.0 - 23.0 mg/dL    Creatinine 0.88 0.67 - 1.17 mg/dL    GFR Estimate 86 >60 mL/min/1.73m2    Calcium 9.1 8.8 - 10.2 mg/dL    Chloride 96 (L) 98 - 107 mmol/L    Glucose 278 (H) 70 - 99 mg/dL    Alkaline Phosphatase 726 (H) 40 - 150 U/L     AST 1,013 (HH) 0 - 45 U/L     (HH) 0 - 70 U/L    Protein Total 6.7 6.4 - 8.3 g/dL    Albumin 4.0 3.5 - 5.2 g/dL    Bilirubin Total 1.5 (H) <=1.2 mg/dL   Lactic acid whole blood   Result Value Ref Range    Lactic Acid 2.2 (H) 0.7 - 2.0 mmol/L   CBC with platelets and differential   Result Value Ref Range    WBC Count 12.6 (H) 4.0 - 11.0 10e3/uL    RBC Count 4.53 4.40 - 5.90 10e6/uL    Hemoglobin 13.6 13.3 - 17.7 g/dL    Hematocrit 41.8 40.0 - 53.0 %    MCV 92 78 - 100 fL    MCH 30.0 26.5 - 33.0 pg    MCHC 32.5 31.5 - 36.5 g/dL    RDW 13.4 10.0 - 15.0 %    Platelet Count 180 150 - 450 10e3/uL    % Neutrophils 48 %    % Lymphocytes 42 %    % Monocytes 8 %    % Eosinophils 1 %    % Basophils 1 %    % Immature Granulocytes 0 %    NRBCs per 100 WBC 0 <1 /100    Absolute Neutrophils 6.1 1.6 - 8.3 10e3/uL    Absolute Lymphocytes 5.3 0.8 - 5.3 10e3/uL    Absolute Monocytes 1.0 0.0 - 1.3 10e3/uL    Absolute Eosinophils 0.1 0.0 - 0.7 10e3/uL    Absolute Basophils 0.1 0.0 - 0.2 10e3/uL    Absolute Immature Granulocytes 0.0 <=0.4 10e3/uL    Absolute NRBCs 0.0 10e3/uL   Abdomen US, limited (RUQ only)    Narrative    EXAM: US ABDOMEN LIMITED  LOCATION: [facility]  DATE: 11/17/2023    INDICATION: fever, elevated LFTs hyperbilirubinemia  COMPARISON: None.  TECHNIQUE: Limited abdominal ultrasound.    FINDINGS:    GALLBLADDER: Surgically removed.    BILE DUCTS: No biliary dilatation. The common duct measures 6 mm.    LIVER: Normal parenchyma with smooth contour. No focal mass.    RIGHT KIDNEY: No hydronephrosis. Small right renal cyst.    PANCREAS: The pancreas is largely obscured by overlying gas.    No ascites.      Impression    IMPRESSION:  1.  Normal limited abdominal ultrasound. Postcholecystectomy.       Lactic acid whole blood   Result Value Ref Range    Lactic Acid 1.7 0.7 - 2.0 mmol/L   Lipase   Result Value Ref Range    Lipase 26 13 - 60 U/L   CT Chest/Abdomen/Pelvis w Contrast     Narrative    EXAM: CT CHEST/ABDOMEN/PELVIS W CONTRAST  LOCATION: [facility]  DATE: 11/17/2023    INDICATION: sepsis, upper abd pain, elevated LFTs, mild elevated bilirubin, RUQ US unremarkable.  COMPARISON: Ultrasound from 11/17/2023.  TECHNIQUE: CT scan of the chest, abdomen, and pelvis was performed following injection of IV contrast. Multiplanar reformats were obtained. Dose reduction techniques were used.   CONTRAST: 100mL, Isovue 370    FINDINGS:   LUNGS AND PLEURA: Bibasilar atelectasis or fibrosis at the lung bases. No acute airspace consolidation. No pneumothorax or pleural effusion.    MEDIASTINUM/AXILLAE: Normal heart size. No pericardial effusion. Extensive coronary artery calcifications. Cardiac pacer leads. Mild wall thickening of the lower thoracic esophagus on image 117 of series 3.    CORONARY ARTERY CALCIFICATION: Severe.    HEPATOBILIARY: Absent gallbladder. Normal liver. Common bile duct mildly dilated measuring 12 mm image 166 series 3. Multiple hyperdense filling defects present in the distal aspect of the common bile duct as seen on coronal images 53-57.    PANCREAS: Normal.    SPLEEN: Normal.    ADRENAL GLANDS: Normal.    KIDNEYS/BLADDER: Normal.    BOWEL: Distal colonic diverticulosis. Normal appendix. No bowel obstruction.    LYMPH NODES: Normal.    VASCULATURE: Extensive aortoiliac atherosclerotic vascular calcification. 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    PELVIC ORGANS: Mild prostatomegaly.    MUSCULOSKELETAL: Bilateral hip arthroplasties. Osteopenia. Multiple old left-sided rib fractures. Thoracolumbar spine degenerative disc changes.      Impression    IMPRESSION:  1.  Dilated common bile duct with multiple filling defects in the distal CBD consistent with stones. ERCP may be of benefit in further evaluation.    2.  Colonic diverticulosis.    3.  Lower thoracic esophageal wall thickening which may be due to reflux.        Medications   sodium chloride 0.9% BOLUS 1,000 mL (0 mLs Intravenous Stopped 11/17/23 2055)   piperacillin-tazobactam (ZOSYN) 4.5 g vial to attach to  mL bag (0 g Intravenous Stopped 11/17/23 2140)   iopamidol (ISOVUE-370) solution 500 mL (100 mLs Intravenous $Given 11/17/23 2258)   sodium chloride 0.9 % bag 100 mL for CT scan flush use (60 mLs Intravenous $Given 11/17/23 2258)       Assessments & Plan (with Medical Decision Making)  83-year-old with some mild dementia and diabetes developed a fever and chills this afternoon.  No associated respiratory symptoms but possibly some slight dysuria.  Urine is unrevealing.  White count of 12.2.  Lactic acid 2.2.  Total bili up slightly at 1.5, alk phos 726, AST 1013, .  He does have the upper abdominal tenderness along with a fever, concerned about cholecystitis.  He thinks he still has his gallbladder.  He was started on IV Zosyn and rectal quadrant ultrasound was ordered.  RUQ ultrasound shows the gallbladder is surgically absent which was a bit of a surprise to him.  No biliary ductal dilatation.  CT of the chest abdomen pelvis ordered to look for any other intrathoracic or intra-abdominal pathology.  COVID influenza and RSV were negative.  Abdominal CT shows dilated common bile duct with multiple filling defects in the distal CBD consistent with stones.  ERCP indicated.  [facility]  I [facility] with [facility] at the [facility] and they do not have any beds and gave us permission to look elsewhere.  They would like to start with [facility] because of its proximity to their house.  [facility] does not have any beds at this time but they will put him on the list if something opens up.    Will look in system first.  [facility] may have a bed.  1:56 AM:  I spoke with Dr Veliz, Hospitalist at [facility].  He graciously accepted the patient in transfer.  He will go by ground ambulance.  He is hemodynamically stable and comfortable.       I have  reviewed the nursing notes.    I have reviewed the findings, diagnosis, plan and need for follow up with the patient.           Medical Decision Making  The patient's presentation was of high complexity (an acute health issue posing potential threat to life or bodily function).    The patient's evaluation involved:  ordering and/or review of 3+ test(s) in this encounter (see separate area of note for details)  discussion of management or test interpretation with another health professional (see separate area of note for details)    The patient's management necessitated high risk (a decision regarding hospitalization).        New Prescriptions    No medications on file       Final diagnoses:   Sepsis without acute organ dysfunction, due to unspecified organism (H)   Gall stones, common bile duct   Elevated LFTs       11/17/2023   [facility] EMERGENCY DEPT       Jb Louise MD  11/18/23 0159

## 2023-11-18 NOTE — PROGRESS NOTES
Pt A&O to person, place and time,  NPO, Denies CP, SOB, N/V, able to make needs known this shift,  Numbness and tingling on BLE d/t Neuropathy, on Tele, NS running@75ml/hr.

## 2023-11-18 NOTE — ED TRIAGE NOTES
Pt arrives via EMS for concerns over fever, body aches, chills this am.  Denies cough, St, HA.  Maybe dysuria.      Triage Assessment (Adult)       Row Name 11/17/23 9447          Triage Assessment    Airway WDL WDL        Respiratory WDL    Respiratory WDL WDL        Cardiac WDL    Cardiac WDL WDL

## 2023-11-18 NOTE — PROGRESS NOTES
Pt A&O x2, confused, up x SBA, VSS.RA. clear liquid diet, BG check, insulin given. LR infusing with intermittent abx. Had ERCP this morning. Denied pain. Baseline BLE numbness. Ambulated to the BR.

## 2023-11-18 NOTE — ANESTHESIA POSTPROCEDURE EVALUATION
Patient: Brannon Boggs    Procedure: Procedure(s):  ENDOSCOPIC RETROGRADE CHOLANGIOPANCREATOGRAPHY, WITH TUBE, STENT, OR FOREIGN BODY REMOVAL       Anesthesia Type:  MAC    Note:  Disposition: Inpatient   Postop Pain Control: Uneventful            Sign Out: Well controlled pain   PONV: No   Neuro/Psych: Uneventful            Sign Out: Acceptable/Baseline neuro status   Airway/Respiratory: Uneventful            Sign Out: Acceptable/Baseline resp. status   CV/Hemodynamics: Uneventful            Sign Out: Acceptable CV status   Other NRE: NONE   DID A NON-ROUTINE EVENT OCCUR? No           Last vitals:  Vitals Value Taken Time   /68 11/18/23 1300   Temp 36.1  C (97  F) 11/18/23 1216   Pulse 60 11/18/23 1303   Resp 16 11/18/23 1303   SpO2 95 % 11/18/23 1303   Vitals shown include unfiled device data.    Electronically Signed By: Francisco Javier Napier MD  November 18, 2023  3:13 PM

## 2023-11-19 LAB
ALBUMIN SERPL BCG-MCNC: 3.5 G/DL (ref 3.5–5.2)
ALP SERPL-CCNC: 473 U/L (ref 40–150)
ALT SERPL W P-5'-P-CCNC: 362 U/L (ref 0–70)
ANION GAP SERPL CALCULATED.3IONS-SCNC: 10 MMOL/L (ref 7–15)
AST SERPL W P-5'-P-CCNC: 196 U/L (ref 0–45)
BILIRUB SERPL-MCNC: 0.9 MG/DL
BUN SERPL-MCNC: 10.1 MG/DL (ref 8–23)
CALCIUM SERPL-MCNC: 8.8 MG/DL (ref 8.8–10.2)
CHLORIDE SERPL-SCNC: 104 MMOL/L (ref 98–107)
CREAT SERPL-MCNC: 0.84 MG/DL (ref 0.67–1.17)
DEPRECATED HCO3 PLAS-SCNC: 24 MMOL/L (ref 22–29)
EGFRCR SERPLBLD CKD-EPI 2021: 87 ML/MIN/1.73M2
ERYTHROCYTE [DISTWIDTH] IN BLOOD BY AUTOMATED COUNT: 13.3 % (ref 10–15)
GLUCOSE BLDC GLUCOMTR-MCNC: 178 MG/DL (ref 70–99)
GLUCOSE BLDC GLUCOMTR-MCNC: 188 MG/DL (ref 70–99)
GLUCOSE BLDC GLUCOMTR-MCNC: 196 MG/DL (ref 70–99)
GLUCOSE BLDC GLUCOMTR-MCNC: 198 MG/DL (ref 70–99)
GLUCOSE BLDC GLUCOMTR-MCNC: 217 MG/DL (ref 70–99)
GLUCOSE SERPL-MCNC: 202 MG/DL (ref 70–99)
HCT VFR BLD AUTO: 36.6 % (ref 40–53)
HGB BLD-MCNC: 12.4 G/DL (ref 13.3–17.7)
MCH RBC QN AUTO: 31.2 PG (ref 26.5–33)
MCHC RBC AUTO-ENTMCNC: 33.9 G/DL (ref 31.5–36.5)
MCV RBC AUTO: 92 FL (ref 78–100)
PLATELET # BLD AUTO: 173 10E3/UL (ref 150–450)
POTASSIUM SERPL-SCNC: 4.2 MMOL/L (ref 3.4–5.3)
PROT SERPL-MCNC: 6.1 G/DL (ref 6.4–8.3)
RBC # BLD AUTO: 3.98 10E6/UL (ref 4.4–5.9)
SODIUM SERPL-SCNC: 138 MMOL/L (ref 135–145)
WBC # BLD AUTO: 12.3 10E3/UL (ref 4–11)

## 2023-11-19 PROCEDURE — 85027 COMPLETE CBC AUTOMATED: CPT | Performed by: INTERNAL MEDICINE

## 2023-11-19 PROCEDURE — 36415 COLL VENOUS BLD VENIPUNCTURE: CPT | Performed by: INTERNAL MEDICINE

## 2023-11-19 PROCEDURE — 250N000013 HC RX MED GY IP 250 OP 250 PS 637: Performed by: INTERNAL MEDICINE

## 2023-11-19 PROCEDURE — 250N000013 HC RX MED GY IP 250 OP 250 PS 637: Performed by: STUDENT IN AN ORGANIZED HEALTH CARE EDUCATION/TRAINING PROGRAM

## 2023-11-19 PROCEDURE — 99233 SBSQ HOSP IP/OBS HIGH 50: CPT | Performed by: INTERNAL MEDICINE

## 2023-11-19 PROCEDURE — 258N000003 HC RX IP 258 OP 636: Performed by: INTERNAL MEDICINE

## 2023-11-19 PROCEDURE — 120N000001 HC R&B MED SURG/OB

## 2023-11-19 PROCEDURE — 250N000012 HC RX MED GY IP 250 OP 636 PS 637: Performed by: INTERNAL MEDICINE

## 2023-11-19 PROCEDURE — 82040 ASSAY OF SERUM ALBUMIN: CPT | Performed by: INTERNAL MEDICINE

## 2023-11-19 PROCEDURE — 250N000011 HC RX IP 250 OP 636: Mod: JZ | Performed by: STUDENT IN AN ORGANIZED HEALTH CARE EDUCATION/TRAINING PROGRAM

## 2023-11-19 RX ADMIN — INSULIN ASPART 3 UNITS: 100 INJECTION, SOLUTION INTRAVENOUS; SUBCUTANEOUS at 11:31

## 2023-11-19 RX ADMIN — INSULIN GLARGINE 16 UNITS: 100 INJECTION, SOLUTION SUBCUTANEOUS at 14:46

## 2023-11-19 RX ADMIN — GABAPENTIN 600 MG: 300 CAPSULE ORAL at 08:57

## 2023-11-19 RX ADMIN — GABAPENTIN 600 MG: 300 CAPSULE ORAL at 20:27

## 2023-11-19 RX ADMIN — PIPERACILLIN AND TAZOBACTAM 3.38 G: 3; .375 INJECTION, POWDER, FOR SOLUTION INTRAVENOUS at 22:18

## 2023-11-19 RX ADMIN — LEVOTHYROXINE SODIUM 50 MCG: 50 TABLET ORAL at 06:44

## 2023-11-19 RX ADMIN — INSULIN ASPART 2 UNITS: 100 INJECTION, SOLUTION INTRAVENOUS; SUBCUTANEOUS at 17:33

## 2023-11-19 RX ADMIN — SODIUM CHLORIDE, POTASSIUM CHLORIDE, SODIUM LACTATE AND CALCIUM CHLORIDE: 600; 310; 30; 20 INJECTION, SOLUTION INTRAVENOUS at 05:01

## 2023-11-19 RX ADMIN — INSULIN ASPART 2 UNITS: 100 INJECTION, SOLUTION INTRAVENOUS; SUBCUTANEOUS at 08:58

## 2023-11-19 RX ADMIN — POLYETHYLENE GLYCOL 3350 17 G: 17 POWDER, FOR SOLUTION ORAL at 08:57

## 2023-11-19 RX ADMIN — PIPERACILLIN AND TAZOBACTAM 3.38 G: 3; .375 INJECTION, POWDER, FOR SOLUTION INTRAVENOUS at 17:29

## 2023-11-19 RX ADMIN — PIPERACILLIN AND TAZOBACTAM 3.38 G: 3; .375 INJECTION, POWDER, FOR SOLUTION INTRAVENOUS at 04:28

## 2023-11-19 RX ADMIN — Medication 1 MG: at 00:12

## 2023-11-19 RX ADMIN — PIPERACILLIN AND TAZOBACTAM 3.38 G: 3; .375 INJECTION, POWDER, FOR SOLUTION INTRAVENOUS at 11:31

## 2023-11-19 RX ADMIN — PANTOPRAZOLE SODIUM 40 MG: 40 TABLET, DELAYED RELEASE ORAL at 08:57

## 2023-11-19 RX ADMIN — HYDRALAZINE HYDROCHLORIDE 10 MG: 20 INJECTION INTRAMUSCULAR; INTRAVENOUS at 23:59

## 2023-11-19 RX ADMIN — DOCUSATE SODIUM 50 MG AND SENNOSIDES 8.6 MG 1 TABLET: 8.6; 5 TABLET, FILM COATED ORAL at 08:57

## 2023-11-19 RX ADMIN — DOCUSATE SODIUM 50 MG AND SENNOSIDES 8.6 MG 2 TABLET: 8.6; 5 TABLET, FILM COATED ORAL at 20:27

## 2023-11-19 ASSESSMENT — ACTIVITIES OF DAILY LIVING (ADL)
ADLS_ACUITY_SCORE: 41
ADLS_ACUITY_SCORE: 44
ADLS_ACUITY_SCORE: 44
ADLS_ACUITY_SCORE: 41
ADLS_ACUITY_SCORE: 44
ADLS_ACUITY_SCORE: 41
ADLS_ACUITY_SCORE: 44
ADLS_ACUITY_SCORE: 41
ADLS_ACUITY_SCORE: 44
ADLS_ACUITY_SCORE: 41

## 2023-11-19 NOTE — PROGRESS NOTES
[facility]    Medicine Progress Note - Hospitalist Service    Date of Admission:  11/18/2023    Assessment & Plan     Brannon Boggs is a 83-year-old male with history of CLL, diabetes mellitus type 2, CAD, HTN, HLD, HFpEF, GERD, AV block s/p pacemaker implantation who presented to [facility] ER with 1 day history of fevers/chills, feeling unwell, abdominal pain, decreased oral intake.  He was found to have choledocholithiasis, acute cholangitis with sepsis and transferred to [facility] on 11/18/2023 for further care and management.    Choledocholithiasis, Acute ascending cholangitis with sepsis, s/p ERCP with sphincterotomy, complete removal of stones, temporary CBD stent placement, Dr. Morse, 11/18/2023  History of previous cholecystectomy  -Initial LFTs-alk phos 726, , AST 1013, total bilirubin 1.5.  WBC 12.6, lactate 2.2  -CT C/A/P 11/17 showed dilated CBD with multiple filling defects consistent with stones, lower esophageal wall thickening which could be due to reflux.  -Underwent ERCP by Lito ROD on 11/18 that showed choledocholithiasis, completely removed by biliary sphincterotomy and balloon extraction.  Pus was found in biliary tree.  1 temporary stent was placed in CBD.  Needs repeat ERCP in 3 months for stent removal  -Improving.  LFTs have improved.  Afebrile.  Leukocytosis persists.  Blood cultures negative  -On IV Zosyn, continue.  Discontinue IV fluids.  Monitor labs    Chronic constipation  Diverticulosis-on CT scan  -Continue MiraLAX and senna-Colace    GERD  -On CT scan noted to have lower esophageal wall thickening which could be due to reflux  -On Protonix, continue.  Also takes as needed Pepcid    Hypothyroidism  -Continue levothyroxine    Diabetes mellitus type 2, with long-term use of insulin, with complications of peripheral neuropathy, well controlled with A1c 6.8 on 10/13/2022  PTA-Lantus 32 units daily, metformin 1000 mg twice  daily  -Continue to hold metformin.  Resume Lantus at a lower dose of 16 units daily  -Continue sliding scale insulin.  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  Has not required any treatment    Alzheimer's dementia  -At risk for delirium.  Currently stable and doing well    Coronary artery disease  -Details not available  -Aspirin on hold.  Likely resume at discharge    Chronic diastolic CHF with preserved EF of 45%  -Echo 6/2023 showed mild LV enlargement with mildly reduced systolic function, EF 45%.  Abnormal septal motion due to pacing.  Normal RV size and function.  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  Appears euvolemic.  Discontinue IV fluids.  Monitor clinically    Chronic paroxysmal atrial fibrillation  -Mentioned history and physical from January 2023.  Not on anticoagulation or rate control medication    S/p permanent pacemaker implantation for sick sinus syndrome  -No current issues    Essential hypertension  -Not on any medication.  Blood pressure moderately elevated with systolic in 150s    Hyperlipidemia  -on atorvastatin.  Currently on hold Due to elevated LFTs    Chronic right shoulder pain, s/p arthroplasty  History of exposure to agent orange              Diet: Moderate Consistent Carb (60 g CHO per Meal) Diet    DVT Prophylaxis: Pneumatic Compression Devices  Valdez Catheter: Not present  Lines: None     Cardiac Monitoring: ACTIVE order. Indication: Tachyarrhythmias, acute (48 hours)  Code Status: Full Code      Clinically Significant Risk Factors            # Hypomagnesemia: Lowest Mg = 1.6 mg/dL in last 2 days, will replace as needed       # Hypertension: Noted on problem list     # Dementia: noted on problem list         # Pacemaker present       Disposition Plan       Expected Discharge Date: 11/20/2023                    Lashanda Leonardo MD  Hospitalist Service  [facility]  Securely message with [employer] (more info)  Text page via [employer] Paging/Directory   ______________________________________________________________________    Interval History   Sitting in chair.  Does not remember when he came to the hospital.  Wondering when he can be discharged.  Vital signs are stable.  Afebrile.  Tolerating diet.  No chest pain or shortness of breath.  No abdominal pain.    Physical Exam   Vital Signs: Temp: 98.4  F (36.9  C) Temp src: Oral BP: (!) 150/62 Pulse: 62   Resp: 18 SpO2: 95 % O2 Device: None (Room air)    Weight: 0 lbs 0 oz    Constitutional - awake and alert, resting in bed, in no acute distress  Cardiovascular - regular rate and rhythm, no murmurs, no edema  Pulmonary - lungs are clear to auscultation bilaterally, no wheezing or rhonchi  GI - abdomen is soft, nontender, nondistended, no hepatosplenomegaly or masses  Integumentary - skin is warm and dry, no rashes or ulcers  Neurological - awake, alert and oriented x3.  Moving all 4 extremities, normal speech, no focal deficits    Medical Decision Making       50 MINUTES SPENT BY ME on the date of service doing chart review, history, exam, documentation & further activities per the note.      Data     I have personally reviewed the following data over the past 24 hrs:    12.3 (H)  \   12.4 (L)   / 173     138 104 10.1 /  196 (H)   4.2 24 0.84 \     ALT: 362 (H) AST: 196 (H) AP: 473 (H) TBILI: 0.9   ALB: 3.5 TOT PROTEIN: 6.1 (L) LIPASE: N/A       Imaging results reviewed over the past 24 hrs:   No results found for this or any previous visit (from the past 24 hour(s)).

## 2023-11-19 NOTE — PROGRESS NOTES
Pt A&O x2, confused. Tried to get up frequently. Reminded pt to use call light. up x SBA with walker. VSS.RA. On tele. 100% paced. BG check, insulin given. Denied pain or SOB. Continues IV abx. Will discharge home tomorrow.

## 2023-11-19 NOTE — PROGRESS NOTES
A&OX2  with intermittent confusion, SBA gb/w, Takes pills whole, BG checks, Needs met this shift, Numbness and tingling BLE baseline, Denies Pain, SOB.CP, N/V, LR Infusing @100ml/hr.

## 2023-11-20 VITALS
RESPIRATION RATE: 16 BRPM | TEMPERATURE: 97.9 F | DIASTOLIC BLOOD PRESSURE: 63 MMHG | SYSTOLIC BLOOD PRESSURE: 138 MMHG | HEART RATE: 60 BPM | OXYGEN SATURATION: 96 %

## 2023-11-20 LAB
ALBUMIN SERPL BCG-MCNC: 3.7 G/DL (ref 3.5–5.2)
ALP SERPL-CCNC: 407 U/L (ref 40–150)
ALT SERPL W P-5'-P-CCNC: 248 U/L (ref 0–70)
ANION GAP SERPL CALCULATED.3IONS-SCNC: 10 MMOL/L (ref 7–15)
AST SERPL W P-5'-P-CCNC: 93 U/L (ref 0–45)
BILIRUB SERPL-MCNC: 0.6 MG/DL
BUN SERPL-MCNC: 8.3 MG/DL (ref 8–23)
CALCIUM SERPL-MCNC: 9.2 MG/DL (ref 8.8–10.2)
CHLORIDE SERPL-SCNC: 104 MMOL/L (ref 98–107)
CREAT SERPL-MCNC: 0.82 MG/DL (ref 0.67–1.17)
DEPRECATED HCO3 PLAS-SCNC: 23 MMOL/L (ref 22–29)
EGFRCR SERPLBLD CKD-EPI 2021: 88 ML/MIN/1.73M2
ERYTHROCYTE [DISTWIDTH] IN BLOOD BY AUTOMATED COUNT: 13.6 % (ref 10–15)
GLUCOSE BLDC GLUCOMTR-MCNC: 152 MG/DL (ref 70–99)
GLUCOSE BLDC GLUCOMTR-MCNC: 184 MG/DL (ref 70–99)
GLUCOSE BLDC GLUCOMTR-MCNC: 197 MG/DL (ref 70–99)
GLUCOSE SERPL-MCNC: 159 MG/DL (ref 70–99)
HAV IGM SERPL QL IA: NONREACTIVE
HBV CORE IGM SERPL QL IA: NONREACTIVE
HBV SURFACE AG SERPL QL IA: NONREACTIVE
HCT VFR BLD AUTO: 37.4 % (ref 40–53)
HCV AB SERPL QL IA: NONREACTIVE
HGB BLD-MCNC: 12.4 G/DL (ref 13.3–17.7)
MCH RBC QN AUTO: 30.2 PG (ref 26.5–33)
MCHC RBC AUTO-ENTMCNC: 33.2 G/DL (ref 31.5–36.5)
MCV RBC AUTO: 91 FL (ref 78–100)
PLATELET # BLD AUTO: 184 10E3/UL (ref 150–450)
POTASSIUM SERPL-SCNC: 3.7 MMOL/L (ref 3.4–5.3)
PROT SERPL-MCNC: 6.5 G/DL (ref 6.4–8.3)
RBC # BLD AUTO: 4.11 10E6/UL (ref 4.4–5.9)
SODIUM SERPL-SCNC: 137 MMOL/L (ref 135–145)
WBC # BLD AUTO: 15.1 10E3/UL (ref 4–11)

## 2023-11-20 PROCEDURE — 85027 COMPLETE CBC AUTOMATED: CPT | Performed by: INTERNAL MEDICINE

## 2023-11-20 PROCEDURE — 82040 ASSAY OF SERUM ALBUMIN: CPT | Performed by: INTERNAL MEDICINE

## 2023-11-20 PROCEDURE — 250N000011 HC RX IP 250 OP 636: Mod: JZ | Performed by: STUDENT IN AN ORGANIZED HEALTH CARE EDUCATION/TRAINING PROGRAM

## 2023-11-20 PROCEDURE — 250N000013 HC RX MED GY IP 250 OP 250 PS 637: Performed by: INTERNAL MEDICINE

## 2023-11-20 PROCEDURE — 250N000013 HC RX MED GY IP 250 OP 250 PS 637: Performed by: STUDENT IN AN ORGANIZED HEALTH CARE EDUCATION/TRAINING PROGRAM

## 2023-11-20 PROCEDURE — 99239 HOSP IP/OBS DSCHRG MGMT >30: CPT | Performed by: INTERNAL MEDICINE

## 2023-11-20 PROCEDURE — 36415 COLL VENOUS BLD VENIPUNCTURE: CPT | Performed by: INTERNAL MEDICINE

## 2023-11-20 RX ORDER — ATORVASTATIN CALCIUM 40 MG/1
40 TABLET, FILM COATED ORAL AT BEDTIME
Start: 2023-11-27

## 2023-11-20 RX ORDER — METRONIDAZOLE 500 MG/1
500 TABLET ORAL 3 TIMES DAILY
Qty: 15 TABLET | Refills: 0 | Status: SHIPPED | OUTPATIENT
Start: 2023-11-20 | End: 2023-11-25

## 2023-11-20 RX ORDER — CEFDINIR 300 MG/1
300 CAPSULE ORAL 2 TIMES DAILY
Qty: 10 CAPSULE | Refills: 0 | Status: SHIPPED | OUTPATIENT
Start: 2023-11-20 | End: 2023-11-25

## 2023-11-20 RX ORDER — MAGNESIUM OXIDE 400 MG/1
400 TABLET ORAL 2 TIMES DAILY
Qty: 60 TABLET | Refills: 0 | Status: SHIPPED | OUTPATIENT
Start: 2023-11-20 | End: 2024-07-13

## 2023-11-20 RX ADMIN — PIPERACILLIN AND TAZOBACTAM 3.38 G: 3; .375 INJECTION, POWDER, FOR SOLUTION INTRAVENOUS at 04:34

## 2023-11-20 RX ADMIN — POLYETHYLENE GLYCOL 3350 17 G: 17 POWDER, FOR SOLUTION ORAL at 08:45

## 2023-11-20 RX ADMIN — DOCUSATE SODIUM 50 MG AND SENNOSIDES 8.6 MG 1 TABLET: 8.6; 5 TABLET, FILM COATED ORAL at 08:44

## 2023-11-20 RX ADMIN — GABAPENTIN 600 MG: 300 CAPSULE ORAL at 08:45

## 2023-11-20 RX ADMIN — Medication 1 MG: at 00:29

## 2023-11-20 RX ADMIN — LEVOTHYROXINE SODIUM 50 MCG: 50 TABLET ORAL at 04:37

## 2023-11-20 RX ADMIN — INSULIN ASPART 1 UNITS: 100 INJECTION, SOLUTION INTRAVENOUS; SUBCUTANEOUS at 08:45

## 2023-11-20 RX ADMIN — INSULIN GLARGINE 16 UNITS: 100 INJECTION, SOLUTION SUBCUTANEOUS at 08:45

## 2023-11-20 RX ADMIN — PANTOPRAZOLE SODIUM 40 MG: 40 TABLET, DELAYED RELEASE ORAL at 08:44

## 2023-11-20 RX ADMIN — PIPERACILLIN AND TAZOBACTAM 3.38 G: 3; .375 INJECTION, POWDER, FOR SOLUTION INTRAVENOUS at 11:43

## 2023-11-20 RX ADMIN — INSULIN ASPART 2 UNITS: 100 INJECTION, SOLUTION INTRAVENOUS; SUBCUTANEOUS at 13:50

## 2023-11-20 ASSESSMENT — ACTIVITIES OF DAILY LIVING (ADL)
ADLS_ACUITY_SCORE: 44
ADLS_ACUITY_SCORE: 44
ADLS_ACUITY_SCORE: 45
ADLS_ACUITY_SCORE: 45
ADLS_ACUITY_SCORE: 44

## 2023-11-20 NOTE — PROGRESS NOTES
11/19/23 [id number]  Pt is alert and  oriented x 2, SBA with gait belt  and walker, MOD Carb diet, PIV SL, intermittent antibiotics. Pt denied having any pain this shift,  tele NSR, Possible discharge home today.

## 2023-11-20 NOTE — PROGRESS NOTES
"Dr. Malissa sanders messaged \"the patient has a pacemaker; the strip appears to show A-fib CVR, but I do not see that they have a history\"  "

## 2023-11-20 NOTE — PROGRESS NOTES
[facility]  Gastroenterology Progress Note     Brannon Boggs MRN# [id number]   YOB: 1940 Age: 83 year old          Assessment and Plan:     Brannon Boggs is a 83-year-old male with history of CLL, diabetes mellitus type 2, CAD, HTN, HLD, HFpEF, GERD, AV block s/p pacemaker implantation who presented to [facility] ER with 1 day history of fevers/chills, feeling unwell, abdominal pain, decreased oral intake.  He was found to have choledocholithiasis, acute cholangitis with sepsis and transferred to [facility] on 11/18/2023 for further care and management.     Cholangitis (H28)  Choledocholithiasis  11/18 ERCP noted choledocholithiasis, complete removal accomplished with biliary sphincterotomy and balloon extraction. Tree was swept and pus was found. One stent placed in CBD  LFts remain elevated and trending down  WBC 15k and up from yesterday    - daily labs  - repeat ERCP in 3 months for stent removal  - if plan to discharge will need an additional few days of antibiotics. Recommend cipro 500 mg BID and metronidazole at time of discharge so has antibiotic coverage for a total of 7 days as WBC remain elevated.               Interval History:     doing well and doing well; no cp, sob, n/v/d, or abd pain. Tolerated breakfast and has no complaints              Review of Systems:     C: NEGATIVE for fever, chills, change in weight  E/M: NEGATIVE for ear, mouth and throat problems  R: NEGATIVE for significant cough or SOB  CV: NEGATIVE for chest pain, palpitations or peripheral edema             Medications:   I have reviewed this patient's current medications   gabapentin  600 mg Oral BID    insulin aspart  1-10 Units Subcutaneous TID AC    insulin aspart  1-7 Units Subcutaneous At Bedtime    insulin glargine  16 Units Subcutaneous QAM AC    levothyroxine  50 mcg Oral QAM AC    pantoprazole  40 mg Oral Daily    piperacillin-tazobactam  3.375 g Intravenous Q6H     polyethylene glycol  17 g Oral Daily    senna-docusate  1 tablet Oral BID    Or    senna-docusate  2 tablet Oral BID    sodium chloride (PF)  3 mL Intracatheter Q8H                  Physical Exam:   Vitals were reviewed  Vital Signs with Ranges  Temp:  [97  F (36.1  C)-98.5  F (36.9  C)] 97.6  F (36.4  C)  Pulse:  [60-62] 60  Resp:  [16-18] 16  BP: (126-181)/(56-78) 133/65  SpO2:  [94 %-100 %] 94 %  I/O last 3 completed shifts:  In: 720 [P.O.:720]  Out: -   Constitutional: healthy, alert, and no distress   Cardiovascular: negative, PMI normal. No lifts, heaves, or thrills. RRR. No murmurs, clicks gallops or rub  Respiratory: negative, Percussion normal. Good diaphragmatic excursion. Lungs clear  Abdomen: Abdomen soft, non-tender. BS normal. No masses, organomegaly           Data:   I reviewed the patient's new clinical lab test results.   Recent Labs   Lab Test 11/20/23  0920 11/19/23  0656 11/18/23  0850 05/30/22  1025 06/01/21  1157 05/19/21  2114   WBC 15.1* 12.3* 12.6*   < > 19.6* 17.3*   HGB 12.4* 12.4* 13.7   < > 14.1 13.9   MCV 91 92 91   < > 92 92    173 183   < > 178 176   INR  --   --  1.02  --  1.07 0.98    < > = values in this interval not displayed.     Recent Labs   Lab Test 11/19/23  0656 11/18/23  0850 11/17/23 1937   POTASSIUM 4.2 4.2 4.5   CHLORIDE 104 102 96*   CO2 24 27 24   BUN 10.1 11.0 11.8   ANIONGAP 10 11 13     Recent Labs   Lab Test 11/19/23  0656 11/18/23  0850 11/17/23  2231 11/17/23  1937 11/17/23  1837 10/06/23  1632 05/19/21  2114 02/12/21  0541 02/11/21  1752   ALBUMIN 3.5 4.2  --  4.0  --   --  3.7   < >  --    BILITOTAL 0.9 1.9*  --  1.5*  --   --  0.4   < >  --    * 658*  --  744*  --   --  33   < >  --    * 552*  --  1,013*  --   --  41   < >  --    PROTEIN  --   --   --   --  Negative 100*  --   --  Negative   LIPASE  --   --  26  --   --   --  105  --   --     < > = values in this interval not displayed.       I reviewed the patient's new imaging  results.    All laboratory data reviewed  All imaging studies reviewed by me.    Josetet Hoover PA-C,  11/20/2023  [facility]  Office : [phone]  Cell: [phone] (Dr. Morse)  Cell: [phone] (Josette Hoover PA-C)

## 2023-11-20 NOTE — DISCHARGE SUMMARY
[facility]  Hospitalist Discharge Summary      Date of Admission:  11/18/2023  Date of Discharge:  11/20/2023  Discharging Provider: Lashanda Leonardo MD  Discharge Service: Hospitalist Service    Discharge Diagnoses     Choledocholithiasis, Acute ascending cholangitis with sepsis, s/p ERCP with sphincterotomy, complete removal of stones, temporary CBD stent placement, Dr. 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  Abnormal septal motion due to pacing.  Normal RV size and function.  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  Appears euvolemic.       Chronic paroxysmal atrial fibrillation    S/p permanent pacemaker implantation for sick sinus syndrome     Essential hypertension     Hyperlipidemia    Hypomagnesemia     Chronic right shoulder pain, s/p arthroplasty  History of exposure to agent orange    Clinically Significant Risk Factors          Follow-ups Needed After Discharge   Follow-up Appointments     Follow-up and recommended labs and tests       Follow up with primary care provider, [facility],   within 7 days for hospital follow- up.  The following labs/tests are   recommended: CBC and CMP in 1 week.      Please make appointment with your cardiologist in 2 weeks to discuss   anticoagulation for atrial fibrillation        {Additional follow-up  instructions/to-do's for PCP    CBC, CMP in 1 week. Discuss anticoagulation for paroxysmal A fib    Unresulted Labs Ordered in the Past 30 Days of this Admission       Date and Time Order Name Status Description    11/18/2023  5:04 AM Blood Culture Arm, Left Preliminary     11/18/2023  5:04 AM Blood Culture Hand, Left Preliminary             Discharge Disposition   Discharged to home  Condition at discharge: Stable    Hospital Course     Brannon Boggs is a 83-year-old male with history of CLL, diabetes mellitus type 2, CAD, HTN, HLD, HFpEF, GERD, AV block s/p pacemaker implantation who presented to [facility] ER with 1 day history of fevers/chills, feeling unwell, abdominal pain, decreased oral intake.  He was found to have choledocholithiasis, acute cholangitis with sepsis and transferred to [facility] on 11/18/2023 for further care and management.     Choledocholithiasis, Acute ascending cholangitis with sepsis, s/p ERCP with sphincterotomy, complete removal of stones, temporary CBD stent placement, Dr. Morse, 11/18/2023  History of previous cholecystectomy  -Initial LFTs-alk phos 726, , AST 1013, total bilirubin 1.5.  WBC 12.6, lactate 2.2  -CT C/A/P 11/17 showed dilated CBD with multiple filling defects consistent with stones, lower esophageal wall thickening which could be due to reflux.  -Underwent ERCP by [facility] on 11/18 that showed choledocholithiasis, completely removed by biliary sphincterotomy and balloon extraction.  Pus was found in biliary tree.  1 temporary stent was placed in CBD.  Needs repeat ERCP in 3 months for stent removal  -Improving.  LFTs have improved.  Afebrile.  Leukocytosis persists.  Blood cultures negative  -treated with IV Zosyn in hospital. Switched to cefdinir + metronidazole for 5 days at discharge to completed 7 days of antibiotics.   - CBC, CMP in week     Chronic constipation  Diverticulosis-on CT scan  -Continue psyllium and senna     GERD  -On CT  scan noted to have lower esophageal wall thickening which could be due to reflux  -On Protonix, continue.  Also takes as needed Pepcid     Hypothyroidism  -Continue levothyroxine     Diabetes mellitus type 2, with long-term use of insulin, with complications of peripheral neuropathy, well controlled with A1c 6.8 on 10/13/2022  PTA-Lantus 32 units daily, metformin 1000 mg twice daily  -metformin resumed at discharge.   -required lower doses of lantus during hospital stay. At discharge lantus dose decreased to 28 units daily.  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  Has not required any treatment     Alzheimer's dementia  - stable      Coronary artery disease  -Details not available  -Aspirin held in hospital, resumed at discharge     Chronic diastolic CHF with preserved EF of 45%  -Echo 6/2023 showed mild LV enlargement with mildly reduced systolic function, EF 45%.  Abnormal septal motion due to pacing.  Normal RV size and function.  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  Appears euvolemic.       Chronic paroxysmal atrial fibrillation  -Mentioned history and physical from January 2023. Had brief episode of a fib with controlled response in hospital. Not on anticoagulation or rate control medication. Discussed with patient's wife, she does not know why he is not on anticoagulation. Denied major bleeds or recurrent falls. Recommend patient follow up with this primary cardiologist at [facility] to consider starting anticoagulation.      S/p permanent pacemaker implantation for sick sinus syndrome  -No current issues     Essential hypertension  -Not on any medication.  Blood pressure ok     Hyperlipidemia  -on atorvastatin. Hold for 1 week, then resume.     Hypomagnesemia: Lowest Mg = 1.6 mg/dL. Started on mag oxide 400 mg bid.       Chronic right shoulder pain, s/p arthroplasty  History of exposure to agent orange       Consultations This Hospital Stay   GASTROENTEROLOGY IP CONSULT  VASCULAR ACCESS ADULT IP CONSULT    Code Status   Full Code    Time Spent on this Encounter   I, Lashanda Leonardo MD, personally saw the patient today and spent greater than 30 minutes discharging this patient.       Lashanda Leonardo MD  [facility]  [address]  [facility] [address]  Phone: [phone]  Fax: [phone]  ______________________________________________________________________    Physical Exam   Vital Signs: Temp: 97.9  F (36.6  C) Temp src: Oral BP: 138/63 Pulse: 60   Resp: 16 SpO2: 96 % O2 Device: None (Room air)    Weight: 0 lbs 0 oz    Constitutional - awake and alert, resting in bed, in no acute distress  Cardiovascular - regular rate and rhythm, no murmurs, no edema  Pulmonary - lungs are clear to auscultation bilaterally, no wheezing or rhonchi  GI - abdomen is soft, nontender, nondistended, no hepatosplenomegaly or masses  Integumentary - skin is warm and dry, no rashes or ulcers  Neurological - awake, alert and oriented x3.  Moving all 4 extremities, normal speech, no focal deficits       Primary Care Physician   [facility]    Discharge Orders      Reason for your hospital stay    Cholangitis with stones in bile duct     Follow-up and recommended labs and tests     Follow up with primary care provider, [facility], within 7 days for hospital follow- up.  The following labs/tests are recommended: CBC and CMP in 1 week.      Please make appointment with your cardiologist in 2 weeks to discuss anticoagulation for atrial fibrillation     Activity    Your activity upon discharge: activity as tolerated     Diet    Follow this diet upon discharge: Moderate Consistent Carb (60 g CHO per Meal) Diet       Significant Results and Procedures   Results for orders placed or  performed during the hospital encounter of 11/18/23   XR ERCP    Narrative    XR ERCP 11/18/2023 12:39 PM    HISTORY: Cholangitis    COMPARISON: None.      Impression    IMPRESSION: 1 spot image demonstrates a plastic biliary stent. See  endoscopist report for details.    FRANKO BARRAGAN MD         SYSTEM ID:  [id number]       Discharge Medications   Current Discharge Medication List        START taking these medications    Details   cefdinir (OMNICEF) 300 MG capsule Take 1 capsule (300 mg) by mouth 2 times daily for 5 days  Qty: 10 capsule, Refills: 0    Associated Diagnoses: Cholangitis (H28)      magnesium oxide (MAG-OX) 400 MG tablet Take 1 tablet (400 mg) by mouth 2 times daily  Qty: 60 tablet, Refills: 0    Associated Diagnoses: Hypomagnesemia      metroNIDAZOLE (FLAGYL) 500 MG tablet Take 1 tablet (500 mg) by mouth 3 times daily for 5 days  Qty: 15 tablet, Refills: 0    Associated Diagnoses: Cholangitis (H28)           CONTINUE these medications which have CHANGED    Details   atorvastatin (LIPITOR) 40 MG tablet Take 1 tablet (40 mg) by mouth at bedtime    Associated Diagnoses: Hypercholesteremia      insulin glargine (LANTUS PEN) 100 UNIT/ML pen Inject 28 Units Subcutaneous every morning    Comments: If Lantus is not covered by insurance, may substitute Basaglar or Semglee or other insulin glargine product per insurance preference at same dose and frequency.    Associated Diagnoses: Type 2 diabetes mellitus without complication, with long-term current use of insulin (H)           CONTINUE these medications which have NOT CHANGED    Details   acetaminophen (TYLENOL) 500 MG tablet Take 1,000 mg by mouth every 6 hours as needed for pain      aspirin (ASA) 81 MG EC tablet Take 81 mg by mouth daily      cyanocobalamin (VITAMIN B-12) 1000 MCG tablet Take 1,000 mcg by mouth daily      famotidine (PEPCID) 20 MG tablet Take 2 tablets (40 mg) by mouth At Bedtime      gabapentin (NEURONTIN) 300 MG capsule Take 600 mg by  mouth 2 times daily Morning and bedtime      levothyroxine (SYNTHROID/LEVOTHROID) 50 MCG tablet Take 50 mcg by mouth daily before breakfast      lidocaine (LIDODERM) 5 % patch Place onto the skin daily as needed for moderate pain (Shoulder pain)      metFORMIN (GLUCOPHAGE XR) 500 MG 24 hr tablet Take 1,000 mg by mouth 2 times daily (with meals)      nitroGLYcerin (NITROSTAT) 0.4 MG sublingual tablet Place 0.4 mg under the tongue every 5 minutes as needed for chest pain      pantoprazole (PROTONIX) 40 MG EC tablet Take 40 mg by mouth daily 1/2 hour before eating      Psyllium (NATURAL FIBER THERAPY) 48.57 % POWD Take 3 tsp by mouth daily as needed (constipation)      senna (SENOKOT) 8.6 MG tablet Take 17.2 mg by mouth At Bedtime      vitamin C (ASCORBIC ACID) 500 MG tablet Take 500 mg by mouth daily      Vitamin D3 (CHOLECALCIFEROL) 25 mcg (1000 units) tablet Take 1 tablet by mouth daily      zinc sulfate (ZINCATE) 220 (50 Zn) MG capsule Take 50 mg by mouth daily           Allergies   No Known Allergies

## 2023-11-21 LAB
ATRIAL RATE - MUSE: 60 BPM
DIASTOLIC BLOOD PRESSURE - MUSE: NORMAL MMHG
INTERPRETATION ECG - MUSE: NORMAL
P AXIS - MUSE: -16 DEGREES
PR INTERVAL - MUSE: 212 MS
QRS DURATION - MUSE: 196 MS
QT - MUSE: 478 MS
QTC - MUSE: 478 MS
R AXIS - MUSE: -59 DEGREES
SYSTOLIC BLOOD PRESSURE - MUSE: NORMAL MMHG
T AXIS - MUSE: 111 DEGREES
VENTRICULAR RATE- MUSE: 60 BPM

## 2023-11-23 LAB
BACTERIA BLD CULT: NO GROWTH
BACTERIA BLD CULT: NO GROWTH

## 2023-12-05 NOTE — PROGRESS NOTES
DISCHARGE  Reason for Discharge: Held pt chart to allow for independent home management. Pt did not follow up with any further PT needs. Will discharge chart at this time.    Equipment Issued: HEP    Discharge Plan: Patient to continue home program.    Referring Provider:  Jorge Luis Mitchell     01/05/23 1300   Appointment Info   Signing clinician's name / credentials Monica Weeks, PT, DPT   Visits Used 7/15, eval   Progress Note/Certification   Progress Note Due Date 12/12/22   Subjective Report   Subjective Report Pt reports that his shoulder has been feeling pretty good. Can feel a little when moving the shoulder around but overall, felt like PT did some good.   Objective Measures   Objective Measures Objective Measure 1;Objective Measure 2   Objective Measure 2   Objective Measure PROM   Details 120 deg flexion, 100 deg abd, 30 deg ER   Treatment Interventions (PT)   Interventions Therapeutic Procedure/Exercise;Manual Therapy   Therapeutic Procedure/Exercise   Therapeutic Procedures: strength, endurance, ROM, flexibillity minutes (29478) 25   Skilled Intervention ROM and strengthening exercises   Patient Response/Progress pt tolerated well and reported that shoulder continues to feel good   Manual Therapy   Manual Therapy: Mobilization, MFR, MLD, friction massage minutes (62316) 15   Skilled Intervention manual techniques   Patient Response/Progress Pt reported that it felt really good after manual   Treatment Detail Pt positioned in supine for STM and TPR to delt, pec, and bicep on R   Education   Learner/Method Patient;Significant Other  (wife, Rossana)   Education Comments Eval findings, POC, HEP   Plan   Home program supine AAROM shoulder flexion, scap retraction, isometric flex/abd/ext   Plan for next session hold for 1-2 months   Medicare Claim Information   Medical Diagnosis Right shoulder pain (M25.511)  - Primary   PT Diagnosis R shoulder pain w/decreased strength, decreased ROM, impaired posture, and  decreased activity tolerance   Start of Care Date 10/28/22   Onset date of current episode/exacerbation 10/12/22   Ortho Goal 1   Goal Identifier HEP   Goal Description Pt will demonstrate accurate compliance to HEP w/assist from wife as needed to independently progress strength and function   Target Date 01/25/23   Ortho Goal 2   Goal Identifier SPADI   Goal Description Pt will improve SPADI score to at least 43% to show a clinically significant improvement in tolerance for daily activities.   Target Date 01/25/23   Ortho Goal 3   Goal Identifier AROM   Goal Description Pt will demonstrate at least 90 deg of shoulder flexion and abduction w/o increased pain to better utilize his dominant arm for daily activities w/o increasing pain   Target Date 01/25/23   Ortho Goal 4   Goal Identifier Posture   Goal Description Pt will demonstrate neutral posture w/decreased foward head and rounded shoulders to allow for better arthrokinimatics at his shoulders to reduce pain   Target Date 01/25/23   Session Number   Authorization status [facility] - approved for 15, auth when 2 visits left or by 2/11/23     Monica Weeks, PT, DPT  [phone]  [facility]  Thank you for this referral

## 2024-01-09 ENCOUNTER — HOSPITAL ENCOUNTER (EMERGENCY)
Facility: CLINIC | Age: 84
Discharge: HOME | End: 2024-01-09
Attending: STUDENT IN AN ORGANIZED HEALTH CARE EDUCATION/TRAINING PROGRAM | Admitting: STUDENT IN AN ORGANIZED HEALTH CARE EDUCATION/TRAINING PROGRAM
Payer: COMMERCIAL

## 2024-01-09 VITALS
SYSTOLIC BLOOD PRESSURE: 112 MMHG | BODY MASS INDEX: 30.77 KG/M2 | TEMPERATURE: 97.5 F | DIASTOLIC BLOOD PRESSURE: 64 MMHG | OXYGEN SATURATION: 96 % | WEIGHT: 226.9 LBS | RESPIRATION RATE: 20 BRPM | HEART RATE: 60 BPM

## 2024-01-09 DIAGNOSIS — R07.89 CHEST DISCOMFORT: Primary | ICD-10-CM

## 2024-01-09 DIAGNOSIS — M94.0 COSTOCHONDRITIS: ICD-10-CM

## 2024-01-09 LAB
ACANTHOCYTES BLD QL SMEAR: NORMAL
ALBUMIN SERPL BCG-MCNC: 4.3 G/DL (ref 3.5–5.2)
ALP SERPL-CCNC: 141 U/L (ref 40–150)
ALT SERPL W P-5'-P-CCNC: 14 U/L (ref 0–70)
ANION GAP SERPL CALCULATED.3IONS-SCNC: 13 MMOL/L (ref 7–15)
AST SERPL W P-5'-P-CCNC: 22 U/L (ref 0–45)
AUER BODIES BLD QL SMEAR: NORMAL
BASO STIPL BLD QL SMEAR: NORMAL
BASOPHILS # BLD AUTO: 0.1 10E3/UL (ref 0–0.2)
BASOPHILS NFR BLD AUTO: 1 %
BILIRUB SERPL-MCNC: 0.7 MG/DL
BITE CELLS BLD QL SMEAR: NORMAL
BLISTER CELLS BLD QL SMEAR: NORMAL
BUN SERPL-MCNC: 11.1 MG/DL (ref 8–23)
BURR CELLS BLD QL SMEAR: NORMAL
CALCIUM SERPL-MCNC: 9.4 MG/DL (ref 8.8–10.2)
CHLORIDE SERPL-SCNC: 97 MMOL/L (ref 98–107)
CREAT SERPL-MCNC: 0.93 MG/DL (ref 0.67–1.17)
DACRYOCYTES BLD QL SMEAR: NORMAL
DEPRECATED HCO3 PLAS-SCNC: 23 MMOL/L (ref 22–29)
EGFRCR SERPLBLD CKD-EPI 2021: 82 ML/MIN/1.73M2
ELLIPTOCYTES BLD QL SMEAR: NORMAL
EOSINOPHIL # BLD AUTO: 0.2 10E3/UL (ref 0–0.7)
EOSINOPHIL NFR BLD AUTO: 2 %
ERYTHROCYTE [DISTWIDTH] IN BLOOD BY AUTOMATED COUNT: 13.2 % (ref 10–15)
FLUAV RNA SPEC QL NAA+PROBE: NEGATIVE
FLUBV RNA RESP QL NAA+PROBE: NEGATIVE
FRAGMENTS BLD QL SMEAR: NORMAL
GLUCOSE SERPL-MCNC: 181 MG/DL (ref 70–99)
HCT VFR BLD AUTO: 44 % (ref 40–53)
HGB BLD-MCNC: 14.4 G/DL (ref 13.3–17.7)
HGB C CRYSTALS: NORMAL
HOLD SPECIMEN: NORMAL
HOWELL-JOLLY BOD BLD QL SMEAR: NORMAL
IMM GRANULOCYTES # BLD: 0.1 10E3/UL
IMM GRANULOCYTES NFR BLD: 0 %
LYMPHOCYTES # BLD AUTO: 8.8 10E3/UL (ref 0.8–5.3)
LYMPHOCYTES NFR BLD AUTO: 56 %
MCH RBC QN AUTO: 29.9 PG (ref 26.5–33)
MCHC RBC AUTO-ENTMCNC: 32.7 G/DL (ref 31.5–36.5)
MCV RBC AUTO: 92 FL (ref 78–100)
MONOCYTES # BLD AUTO: 0.8 10E3/UL (ref 0–1.3)
MONOCYTES NFR BLD AUTO: 5 %
NEUTROPHILS # BLD AUTO: 5.5 10E3/UL (ref 1.6–8.3)
NEUTROPHILS NFR BLD AUTO: 36 %
NEUTS HYPERSEG BLD QL SMEAR: NORMAL
NRBC # BLD AUTO: 0 10E3/UL
NRBC BLD AUTO-RTO: 0 /100
PLAT MORPH BLD: NORMAL
PLATELET # BLD AUTO: 205 10E3/UL (ref 150–450)
POLYCHROMASIA BLD QL SMEAR: NORMAL
POTASSIUM SERPL-SCNC: 4.3 MMOL/L (ref 3.4–5.3)
PROCALCITONIN SERPL IA-MCNC: 0.04 NG/ML
PROT SERPL-MCNC: 7.2 G/DL (ref 6.4–8.3)
RBC # BLD AUTO: 4.81 10E6/UL (ref 4.4–5.9)
RBC AGGLUT BLD QL: NORMAL
RBC MORPH BLD: NORMAL
ROULEAUX BLD QL SMEAR: NORMAL
RSV RNA SPEC NAA+PROBE: NEGATIVE
SARS-COV-2 RNA RESP QL NAA+PROBE: NEGATIVE
SICKLE CELLS BLD QL SMEAR: NORMAL
SMUDGE CELLS BLD QL SMEAR: NORMAL
SODIUM SERPL-SCNC: 133 MMOL/L (ref 135–145)
SPHEROCYTES BLD QL SMEAR: NORMAL
STOMATOCYTES BLD QL SMEAR: NORMAL
TARGETS BLD QL SMEAR: NORMAL
TOXIC GRANULES BLD QL SMEAR: NORMAL
TROPONIN T SERPL HS-MCNC: 17 NG/L
TROPONIN T SERPL HS-MCNC: 19 NG/L
VARIANT LYMPHS BLD QL SMEAR: NORMAL
WBC # BLD AUTO: 15.4 10E3/UL (ref 4–11)

## 2024-01-09 PROCEDURE — 84484 ASSAY OF TROPONIN QUANT: CPT | Performed by: EMERGENCY MEDICINE

## 2024-01-09 PROCEDURE — 87637 SARSCOV2&INF A&B&RSV AMP PRB: CPT | Performed by: STUDENT IN AN ORGANIZED HEALTH CARE EDUCATION/TRAINING PROGRAM

## 2024-01-09 PROCEDURE — 99284 EMERGENCY DEPT VISIT MOD MDM: CPT

## 2024-01-09 PROCEDURE — 84145 PROCALCITONIN (PCT): CPT | Performed by: STUDENT IN AN ORGANIZED HEALTH CARE EDUCATION/TRAINING PROGRAM

## 2024-01-09 PROCEDURE — 36415 COLL VENOUS BLD VENIPUNCTURE: CPT | Performed by: STUDENT IN AN ORGANIZED HEALTH CARE EDUCATION/TRAINING PROGRAM

## 2024-01-09 PROCEDURE — 36415 COLL VENOUS BLD VENIPUNCTURE: CPT | Performed by: EMERGENCY MEDICINE

## 2024-01-09 PROCEDURE — 99284 EMERGENCY DEPT VISIT MOD MDM: CPT | Mod: 25 | Performed by: STUDENT IN AN ORGANIZED HEALTH CARE EDUCATION/TRAINING PROGRAM

## 2024-01-09 PROCEDURE — 84484 ASSAY OF TROPONIN QUANT: CPT | Performed by: STUDENT IN AN ORGANIZED HEALTH CARE EDUCATION/TRAINING PROGRAM

## 2024-01-09 PROCEDURE — 93005 ELECTROCARDIOGRAM TRACING: CPT

## 2024-01-09 PROCEDURE — 93010 ELECTROCARDIOGRAM REPORT: CPT | Performed by: STUDENT IN AN ORGANIZED HEALTH CARE EDUCATION/TRAINING PROGRAM

## 2024-01-09 PROCEDURE — 85004 AUTOMATED DIFF WBC COUNT: CPT | Performed by: STUDENT IN AN ORGANIZED HEALTH CARE EDUCATION/TRAINING PROGRAM

## 2024-01-09 PROCEDURE — 80053 COMPREHEN METABOLIC PANEL: CPT | Performed by: STUDENT IN AN ORGANIZED HEALTH CARE EDUCATION/TRAINING PROGRAM

## 2024-01-09 ASSESSMENT — ACTIVITIES OF DAILY LIVING (ADL)
ADLS_ACUITY_SCORE: 35
ADLS_ACUITY_SCORE: 35

## 2024-01-09 NOTE — ED TRIAGE NOTES
Pt presents with chest pain that started yesterday. Worsening today. Took 3 nitroglycerin with no relief. History of stents. Shortness of breath.      Triage Assessment (Adult)       Row Name 01/09/24 1032          Triage Assessment    Airway WDL WDL        Respiratory WDL    Respiratory WDL X   shortness of breath        Skin Circulation/Temperature WDL    Skin Circulation/Temperature WDL WDL        Cardiac WDL    Cardiac WDL WDL        Peripheral/Neurovascular WDL    Peripheral Neurovascular WDL WDL        Cognitive/Neuro/Behavioral WDL    Cognitive/Neuro/Behavioral WDL WDL

## 2024-01-09 NOTE — DISCHARGE INSTRUCTIONS
Please follow-up with your primary care doctor as needed over the next week to 2 weeks to have readdressed of your current complaint.  If any thing changes or any new symptoms arise please return for reevaluation.  Otherwise please go to your already scheduled appointments.

## 2024-01-09 NOTE — ED PROVIDER NOTES
History     Chief Complaint   Patient presents with    Chest Pain     HPI  Brannon Boggs is a 83 year old male who with his wife for concerns of chest pain that started this morning.  Patient was provided with 3 doses of nitro prior to his wife and did not significantly alleviate his chest pain.  He notes that it lasted a few minutes and then dissipated but do not believe the nitro had anything to do with it.  He does suffer from chronic dementia and is unable to tell me where his chest pain was located.  His wife notes he did not complain of any nausea did not have any sweats or any dizziness.  He has not had any recent illnesses but does currently take antibiotics for a infected renal stone that had a stent recently placed.  He has not had fevers at home is otherwise been doing well and has not had any urinary complaints.    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  Added automatically from request for surgery [id number]      Leukocytosis 08/01/2018     Priority: Medium    Agent Orange poisoning 04/06/2011     Priority: Medium     Formatting of this note might be different from the  original.  Pers. Hx of exposure to agent orange      Hypercholesteremia 04/06/2011     Priority: Medium    Essential hypertension 07/19/2005     Priority: Medium     Formatting of this note might be different from the original.  Borderline  IMO Update          Past Medical History:    No past medical history on file.    Past Surgical History:    Past Surgical History:   Procedure Laterality Date    ENDOSCOPIC RETROGRADE CHOLANGIOPANCREATOGRAM N/A 11/18/2023    Procedure: ENDOSCOPIC RETROGRADE CHOLANGIOPANCREATOGRAPHY, WITH STENT PLACEMENT;  Surgeon: Jose Morse MD;  Location:  OR       Family History:    No family history on file.    Social History:  Marital Status:   [2]  Social History     Tobacco Use    Smoking status: Former    Smokeless tobacco: Never   Vaping Use    Vaping Use: Never used   Substance Use Topics    Alcohol use: Not Currently    Drug use: Not Currently        Medications:    acetaminophen (TYLENOL) 500 MG tablet  aspirin (ASA) 81 MG EC tablet  atorvastatin (LIPITOR) 40 MG tablet  cyanocobalamin (VITAMIN B-12) 1000 MCG tablet  famotidine (PEPCID) 20 MG tablet  gabapentin (NEURONTIN) 300 MG capsule  insulin glargine (LANTUS PEN) 100 UNIT/ML pen  levothyroxine (SYNTHROID/LEVOTHROID) 50 MCG tablet  lidocaine (LIDODERM) 5 % patch  magnesium oxide (MAG-OX) 400 MG tablet  metFORMIN (GLUCOPHAGE XR) 500 MG 24 hr tablet  nitroGLYcerin (NITROSTAT) 0.4 MG sublingual tablet  pantoprazole (PROTONIX) 40 MG EC tablet  Psyllium (NATURAL FIBER THERAPY) 48.57 % POWD  senna (SENOKOT) 8.6 MG tablet  vitamin C (ASCORBIC ACID) 500 MG tablet  Vitamin D3 (CHOLECALCIFEROL) 25 mcg (1000 units) tablet  zinc sulfate (ZINCATE) 220 (50 Zn) MG capsule          Review of Systems   Cardiovascular:  Positive for chest pain.   All other systems reviewed and are negative.      Physical Exam   BP: (!) 158/76  Pulse: 76  Temp: 97.5  F (36.4  C)  Resp: 16  Weight: 102.9 kg (226 lb 14.4 oz)  SpO2: 99 %      Physical  Exam  Vitals and nursing note reviewed.   Constitutional:       General: He is not in acute distress.     Appearance: He is normal weight. He is not ill-appearing, toxic-appearing or diaphoretic.   HENT:      Head: Normocephalic and atraumatic.   Cardiovascular:      Rate and Rhythm: Normal rate and regular rhythm.      Pulses: Normal pulses.      Heart sounds: No murmur heard.  Pulmonary:      Effort: Pulmonary effort is normal. No respiratory distress.      Breath sounds: Normal breath sounds. No wheezing or rales.   Chest:      Chest wall: Tenderness (left sternal border) present.   Abdominal:      General: Abdomen is flat. Bowel sounds are normal. There is no distension.      Palpations: Abdomen is soft.      Tenderness: There is no abdominal tenderness. There is no guarding.   Musculoskeletal:      Right lower leg: No edema.      Left lower leg: No edema.   Skin:     General: Skin is warm.      Capillary Refill: Capillary refill takes less than 2 seconds.      Findings: No erythema or rash.   Neurological:      General: No focal deficit present.      Mental Status: He is alert. Mental status is at baseline.   Psychiatric:         Mood and Affect: Mood normal.         ED Course                 Procedures         EKG self interpreted at 10:41 AM.  Rate at 71 bpm [place] at 226 and QTc at 463.  Patient has a left bundle branch block with no acute signs of Sgarbossa criteria concerning for ACS..  Imaging 212/22 no significant change aside from pacing spikes being present.             Results for orders placed or performed during the hospital encounter of 01/09/24 (from the past 24 hour(s))   CBC with Platelets & Differential    Narrative    The following orders were created for panel order CBC with Platelets & Differential.  Procedure                               Abnormality         Status                     ---------                               -----------         ------                     CBC with platelets and  d...[[id number]]  Abnormal            Final result               RBC and Platelet Morphology[[id number]]                      Final result                 Please view results for these tests on the individual orders.   Troponin T, High Sensitivity   Result Value Ref Range    Troponin T, High Sensitivity 19 <=22 ng/L   [place] Draw    Narrative    The following orders were created for panel order [place] Draw.  Procedure                               Abnormality         Status                     ---------                               -----------         ------                     Extra Blue Top Tube[[id number]]                              Final result                 Please view results for these tests on the individual orders.   CBC with platelets and differential   Result Value Ref Range    WBC Count 15.4 (H) 4.0 - 11.0 10e3/uL    RBC Count 4.81 4.40 - 5.90 10e6/uL    Hemoglobin 14.4 13.3 - 17.7 g/dL    Hematocrit 44.0 40.0 - 53.0 %    MCV 92 78 - 100 fL    MCH 29.9 26.5 - 33.0 pg    MCHC 32.7 31.5 - 36.5 g/dL    RDW 13.2 10.0 - 15.0 %    Platelet Count 205 150 - 450 10e3/uL    % Neutrophils 36 %    % Lymphocytes 56 %    % Monocytes 5 %    % Eosinophils 2 %    % Basophils 1 %    % Immature Granulocytes 0 %    NRBCs per 100 WBC 0 <1 /100    Absolute Neutrophils 5.5 1.6 - 8.3 10e3/uL    Absolute Lymphocytes 8.8 (H) 0.8 - 5.3 10e3/uL    Absolute Monocytes 0.8 0.0 - 1.3 10e3/uL    Absolute Eosinophils 0.2 0.0 - 0.7 10e3/uL    Absolute Basophils 0.1 0.0 - 0.2 10e3/uL    Absolute Immature Granulocytes 0.1 <=0.4 10e3/uL    Absolute NRBCs 0.0 10e3/uL   Extra Blue Top Tube   Result Value Ref Range    Hold Specimen [facility]    RBC and Platelet Morphology   Result Value Ref Range    Platelet Assessment  Automated Count Confirmed. Platelet morphology is normal.     Automated Count Confirmed. Platelet morphology is normal.    Acanthocytes      Ryan Rods      Basophilic Stippling      Bite Cells      Blister Cells      [place] Cells       Elliptocytes      Hgb C Crystals      Isaac-Jolly Bodies      Hypersegmented Neutrophils      Polychromasia      RBC agglutination      RBC Fragments      Reactive Lymphocytes      Rouleaux      Sickle Cells      Smudge Cells      Spherocytes      Stomatocytes      Target Cells      Teardrop Cells      Toxic Neutrophils      RBC Morphology Confirmed RBC Indices    Comprehensive metabolic panel   Result Value Ref Range    Sodium 133 (L) 135 - 145 mmol/L    Potassium 4.3 3.4 - 5.3 mmol/L    Carbon Dioxide (CO2) 23 22 - 29 mmol/L    Anion Gap 13 7 - 15 mmol/L    Urea Nitrogen 11.1 8.0 - 23.0 mg/dL    Creatinine 0.93 0.67 - 1.17 mg/dL    GFR Estimate 82 >60 mL/min/1.73m2    Calcium 9.4 8.8 - 10.2 mg/dL    Chloride 97 (L) 98 - 107 mmol/L    Glucose 181 (H) 70 - 99 mg/dL    Alkaline Phosphatase 141 40 - 150 U/L    AST 22 0 - 45 U/L    ALT 14 0 - 70 U/L    Protein Total 7.2 6.4 - 8.3 g/dL    Albumin 4.3 3.5 - 5.2 g/dL    Bilirubin Total 0.7 <=1.2 mg/dL   Procalcitonin   Result Value Ref Range    Procalcitonin 0.04 <0.50 ng/mL   Asymptomatic Influenza A/B, RSV, & SARS-CoV2 PCR (COVID-19) Nose    Specimen: Nose; Swab   Result Value Ref Range    Influenza A PCR Negative Negative    Influenza B PCR Negative Negative    RSV PCR Negative Negative    SARS CoV2 PCR Negative Negative    Narrative    Testing was performed using the Xpert Xpress CoV2/Flu/RSV Assay on the [employer] GeneXpert Instrument. This test should be ordered for the detection of SARS-CoV-2, influenza, and RSV viruses in individuals who meet clinical and/or epidemiological criteria. Test performance is unknown in asymptomatic patients. This test is for in vitro diagnostic use under the FDA EUA for laboratories certified under CLIA to perform high or moderate complexity testing. This test has not been FDA cleared or approved. A negative result does not rule out the presence of PCR inhibitors in the specimen or target RNA in concentration below the limit of  detection for the assay. If only one viral target is positive but coinfection with multiple targets is suspected, the sample should be re-tested with another FDA cleared, approved, or authorized test, if coinfection would change clinical management. This test was validated by the [facility] [employer]. These laboratories are certified under the Clinical Laboratory Improvement Amendments of 1988 (CLIA-88) as qualified to perform high complexity laboratory testing.   Troponin T, High Sensitivity   Result Value Ref Range    Troponin T, High Sensitivity 17 <=22 ng/L       Medications - No data to display    Assessments & Plan (with Medical Decision Making)     I have reviewed the nursing notes.    I have reviewed the findings, diagnosis, plan and need for follow up with the patient.        Medical Decision Making  Patient presented with sudden onset chest pain today.  His dementia is unable to tell me the location of the chest pain or discomfort.  3 dose of nitro was provided without significant proving.  Pain resolved shortly afterwards.  He has not had any lightheadedness dizziness nausea or sweats during this episode.  He has not had any increase in cough.  He currently taking antibiotics for a stent placed for secondary obstructive urolithiasis.  He denies any bloody discharge from the urine or any current complaints about urinations.  He has no chest pains at this time or any complaint of abdominal pain.  His physical exam is otherwise benign.  His wife is at bedside and provides additional history.  EKG on review did not show any acute changes to his previous for concerns of ACS.  His troponin was 19 and then repeat was down to 17.  His procalcitonin 0.04.  His influenza COVID RSV was negative he does have a mild elevated white cell count 15.4 consistent with his noted UTI they take antibiotics for.  His CMP is otherwise CMP is unremarkable.  He has significant tenderness to palpation of his left sternal  border consistent with costochondritis.  Discussed outpatient follow-up instructions with primary care physician for further evaluation.  Likely discomfort secondary to his costochondritis however important patient follows up secondary to chronic medical history.  His vitals are stable with no tachycardia hypotension or hypoxia.  At this time no further imaging necessary and feel comfortable discharging home.      New Prescriptions    No medications on file       Final diagnoses:   Chest discomfort   Costochondritis       1/9/2024   [facility] EMERGENCY DEPT       Alden Vicente MD  01/09/24 [id number]

## 2024-04-17 ENCOUNTER — HOSPITAL ENCOUNTER (EMERGENCY)
Facility: CLINIC | Age: 84
Discharge: HOME | End: 2024-04-17
Attending: EMERGENCY MEDICINE | Admitting: EMERGENCY MEDICINE
Payer: COMMERCIAL

## 2024-04-17 VITALS
DIASTOLIC BLOOD PRESSURE: 84 MMHG | BODY MASS INDEX: 30.65 KG/M2 | HEART RATE: 78 BPM | TEMPERATURE: 97.6 F | WEIGHT: 226 LBS | SYSTOLIC BLOOD PRESSURE: 144 MMHG | RESPIRATION RATE: 18 BRPM | OXYGEN SATURATION: 98 %

## 2024-04-17 DIAGNOSIS — N39.0 URINARY TRACT INFECTION WITHOUT HEMATURIA, SITE UNSPECIFIED: ICD-10-CM

## 2024-04-17 LAB
ALBUMIN UR-MCNC: 30 MG/DL
APPEARANCE UR: CLEAR
BACTERIA #/AREA URNS HPF: ABNORMAL /HPF
BILIRUB UR QL STRIP: NEGATIVE
COLOR UR AUTO: YELLOW
GLUCOSE UR STRIP-MCNC: NEGATIVE MG/DL
HGB UR QL STRIP: ABNORMAL
KETONES UR STRIP-MCNC: NEGATIVE MG/DL
LEUKOCYTE ESTERASE UR QL STRIP: ABNORMAL
MUCOUS THREADS #/AREA URNS LPF: PRESENT /LPF
NITRATE UR QL: NEGATIVE
PH UR STRIP: 6.5 [PH] (ref 5–7)
RBC URINE: 3 /HPF
SP GR UR STRIP: 1.01 (ref 1–1.03)
UROBILINOGEN UR STRIP-MCNC: NORMAL MG/DL
WBC CLUMPS #/AREA URNS HPF: PRESENT /HPF
WBC URINE: >100 /HPF

## 2024-04-17 PROCEDURE — 99284 EMERGENCY DEPT VISIT MOD MDM: CPT | Performed by: EMERGENCY MEDICINE

## 2024-04-17 PROCEDURE — 87086 URINE CULTURE/COLONY COUNT: CPT | Performed by: EMERGENCY MEDICINE

## 2024-04-17 PROCEDURE — 99283 EMERGENCY DEPT VISIT LOW MDM: CPT | Performed by: EMERGENCY MEDICINE

## 2024-04-17 PROCEDURE — 81001 URINALYSIS AUTO W/SCOPE: CPT | Performed by: EMERGENCY MEDICINE

## 2024-04-17 RX ORDER — CIPROFLOXACIN 500 MG/1
500 TABLET, FILM COATED ORAL 2 TIMES DAILY
Qty: 14 TABLET | Refills: 0 | Status: SHIPPED | OUTPATIENT
Start: 2024-04-17 | End: 2024-04-22

## 2024-04-17 ASSESSMENT — COLUMBIA-SUICIDE SEVERITY RATING SCALE - C-SSRS
6. HAVE YOU EVER DONE ANYTHING, STARTED TO DO ANYTHING, OR PREPARED TO DO ANYTHING TO END YOUR LIFE?: NO
1. IN THE PAST MONTH, HAVE YOU WISHED YOU WERE DEAD OR WISHED YOU COULD GO TO SLEEP AND NOT WAKE UP?: NO
2. HAVE YOU ACTUALLY HAD ANY THOUGHTS OF KILLING YOURSELF IN THE PAST MONTH?: NO

## 2024-04-17 ASSESSMENT — ACTIVITIES OF DAILY LIVING (ADL)
ADLS_ACUITY_SCORE: 38
ADLS_ACUITY_SCORE: 36

## 2024-04-17 NOTE — ED PROVIDER NOTES
History     Chief Complaint   Patient presents with    Urinary Retention     HPI  Brannon Boggs is a 84 year old male who presents with feeling he cannot empty his bladder.  5 days ago he had some bladder cancer treated.  He is taking fluids well.  He was a little sleepier than normal last night.  He is getting around the house reasonably well family reports.  Frequent urination.  No fever.  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  Added automatically from request for surgery [id number]      Leukocytosis 08/01/2018     Priority: Medium    Agent Orange poisoning 04/06/2011     Priority: Medium     Formatting of this note might be different from the original.  Pers. Hx of exposure to agent orange      Hypercholesteremia 04/06/2011     Priority: Medium    Essential hypertension 07/19/2005     Priority: Medium     Formatting of this note might be different from the original.  Borderline  IMO Update          Past Medical History:    No past medical history on file.    Past Surgical History:    Past Surgical History:   Procedure Laterality Date    ENDOSCOPIC RETROGRADE  CHOLANGIOPANCREATOGRAM N/A 11/18/2023    Procedure: ENDOSCOPIC RETROGRADE CHOLANGIOPANCREATOGRAPHY, WITH STENT PLACEMENT;  Surgeon: Jose Morse MD;  Location:  OR       Family History:    No family history on file.    Social History:  Marital Status:   [2]  Social History     Tobacco Use    Smoking status: Former    Smokeless tobacco: Never   Vaping Use    Vaping status: Never Used   Substance Use Topics    Alcohol use: Not Currently    Drug use: Not Currently        Medications:    ciprofloxacin (CIPRO) 500 MG tablet  acetaminophen (TYLENOL) 500 MG tablet  aspirin (ASA) 81 MG EC tablet  atorvastatin (LIPITOR) 40 MG tablet  cyanocobalamin (VITAMIN B-12) 1000 MCG tablet  famotidine (PEPCID) 20 MG tablet  gabapentin (NEURONTIN) 300 MG capsule  insulin glargine (LANTUS PEN) 100 UNIT/ML pen  levothyroxine (SYNTHROID/LEVOTHROID) 50 MCG tablet  lidocaine (LIDODERM) 5 % patch  magnesium oxide (MAG-OX) 400 MG tablet  metFORMIN (GLUCOPHAGE XR) 500 MG 24 hr tablet  nitroGLYcerin (NITROSTAT) 0.4 MG sublingual tablet  pantoprazole (PROTONIX) 40 MG EC tablet  Psyllium (NATURAL FIBER THERAPY) 48.57 % POWD  senna (SENOKOT) 8.6 MG tablet  vitamin C (ASCORBIC ACID) 500 MG tablet  Vitamin D3 (CHOLECALCIFEROL) 25 mcg (1000 units) tablet  zinc sulfate (ZINCATE) 220 (50 Zn) MG capsule          Review of Systems  All other systems are reviewed and are negative    Physical Exam   BP: (!) 144/84  Pulse: 78  Temp: 97.6  F (36.4  C)  Resp: 18  Weight: 102.5 kg (226 lb)  SpO2: 98 %      Physical Exam  Vitals and nursing note reviewed.   Constitutional:       General: He is not in acute distress.     Appearance: He is well-developed. He is not diaphoretic.   HENT:      Head: Normocephalic and atraumatic.      Nose: Nose normal.      Mouth/Throat:      Mouth: Mucous membranes are moist.      Pharynx: Oropharynx is clear.   Eyes:      General: No scleral icterus.        Right eye: No discharge.         Left eye: No discharge.       Conjunctiva/sclera: Conjunctivae normal.   Cardiovascular:      Rate and Rhythm: Normal rate and regular rhythm.      Heart sounds: Normal heart sounds. No murmur heard.  Pulmonary:      Effort: Pulmonary effort is normal. No respiratory distress.      Breath sounds: Normal breath sounds. No stridor.   Abdominal:      General: There is no distension.      Palpations: Abdomen is soft.      Tenderness: There is no abdominal tenderness. There is no guarding or rebound.   Musculoskeletal:         General: Normal range of motion.      Cervical back: Normal range of motion and neck supple.   Skin:     General: Skin is warm and dry.      Coloration: Skin is not pale.      Findings: No erythema or rash.   Neurological:      General: No focal deficit present.      Mental Status: He is alert.      Cranial Nerves: No cranial nerve deficit.      Motor: No abnormal muscle tone.   Psychiatric:         Mood and Affect: Mood normal.         ED Course        Procedures           Results for orders placed or performed during the hospital encounter of 04/17/24 (from the past 24 hour(s))   UA with Microscopic reflex to Culture    Specimen: Urine, Clean Catch   Result Value Ref Range    Color Urine Yellow Colorless, Straw, Light Yellow, Yellow    Appearance Urine Clear Clear    Glucose Urine Negative Negative mg/dL    Bilirubin Urine Negative Negative    Ketones Urine Negative Negative mg/dL    Specific Gravity Urine 1.015 1.003 - 1.035    Blood Urine Small (A) Negative    pH Urine 6.5 5.0 - 7.0    Protein Albumin Urine 30 (A) Negative mg/dL    Urobilinogen Urine Normal Normal, 2.0 mg/dL    Nitrite Urine Negative Negative    Leukocyte Esterase Urine Moderate (A) Negative    Bacteria Urine Few (A) None Seen /HPF    WBC Clumps Urine Present (A) None Seen /HPF    Mucus Urine Present (A) None Seen /LPF    RBC Urine 3 (H) <=2 /HPF    WBC Urine >100 (H) <=5 /HPF    Narrative    Urine Culture ordered based on laboratory criteria        Medications - No data to display    Assessments & Plan (with Medical Decision Making)  84-year-old male with urinary frequency and urinalysis consistent with UTI.  Recent bladder instrumentation.  Will place on Cipro twice daily for 7 days.  He appears hemodynamically stable.  Also patient and family are comfortable returning home.  Did instruct return if condition worsens, unable to navigate things at home or any other concern.     I have reviewed the nursing notes.    I have reviewed the findings, diagnosis, plan and need for follow up with the patient.          New Prescriptions    CIPROFLOXACIN (CIPRO) 500 MG TABLET    Take 1 tablet (500 mg) by mouth 2 times daily for 7 days       Final diagnoses:   Urinary tract infection without hematuria, site unspecified       4/17/2024   [facility] EMERGENCY DEPT       Deo Guzmán MD  04/17/24 [id number]

## 2024-04-17 NOTE — ED TRIAGE NOTES
Pt presents with concern for urinary retention. Pt had surgery to remove bladder cancer on the 26th and has done really good the last 5 days without a catheter. Yesterday voided many times and today, feels like he has to and can't .

## 2024-04-22 ENCOUNTER — TELEPHONE (OUTPATIENT)
Dept: NURSING | Facility: CLINIC | Age: 84
End: 2024-04-22
Payer: COMMERCIAL

## 2024-04-22 DIAGNOSIS — N39.0 URINARY TRACT INFECTION: Primary | ICD-10-CM

## 2024-04-22 LAB
BACTERIA UR CULT: ABNORMAL
BACTERIA UR CULT: ABNORMAL

## 2024-04-22 NOTE — TELEPHONE ENCOUNTER
[facility]    Reason for call: Lab Result Notification     Lab Result (including Rx patient on, if applicable).  If culture, copy of lab report at bottom.  Lab Result: See below    Prescribed ciprofloxacin (CIPRO) 500 MG tablet BID x 7 days    Creatinine Level (mg/dl)   Creatinine   Date Value Ref Range Status   01/09/2024 0.93 0.67 - 1.17 mg/dL Final   06/01/2021 0.97 0.66 - 1.25 mg/dL Final    Creatinine clearance (ml/min), if applicable    Serum creatinine: 0.93 mg/dL 01/09/24 1045  Estimated creatinine clearance: 74.6 mL/min     Patient's current Symptoms:   Patient denies new symptoms.  Patient's wife states that he is still going to the bathroom frequently. Advised to discontinue the cipro and start Augmentin.     Allergies Reviewed Yes    Pregnant N/A    Breastfeeding N/A    On Warfarin No    RN Recommendations/Instructions per [facility] ED lab result protocol:   [facility] ED lab result protocol utilized: Urine Culture  Advise to discontinue current antibiotic.   Instruct to start antibiotic, sent to preferred pharmacy.     Patient/care giver notified to contact your PCP clinic or return to the Emergency department if your:  Symptoms return.  Symptoms do not improve after 3 days on antibiotic.  Symptoms do not resolve after completing antibiotic.  Symptoms worsen or other concerning symptoms.    SCARLET OSWALD RN

## 2024-07-13 ENCOUNTER — APPOINTMENT (OUTPATIENT)
Dept: GENERAL RADIOLOGY | Facility: CLINIC | Age: 84
End: 2024-07-13
Attending: STUDENT IN AN ORGANIZED HEALTH CARE EDUCATION/TRAINING PROGRAM
Payer: COMMERCIAL

## 2024-07-13 ENCOUNTER — HOSPITAL ENCOUNTER (EMERGENCY)
Facility: CLINIC | Age: 84
Discharge: HOME | End: 2024-07-13
Attending: STUDENT IN AN ORGANIZED HEALTH CARE EDUCATION/TRAINING PROGRAM | Admitting: STUDENT IN AN ORGANIZED HEALTH CARE EDUCATION/TRAINING PROGRAM
Payer: COMMERCIAL

## 2024-07-13 VITALS
RESPIRATION RATE: 14 BRPM | SYSTOLIC BLOOD PRESSURE: 156 MMHG | TEMPERATURE: 100.4 F | HEART RATE: 59 BPM | WEIGHT: 231 LBS | DIASTOLIC BLOOD PRESSURE: 78 MMHG | BODY MASS INDEX: 31.33 KG/M2 | OXYGEN SATURATION: 96 %

## 2024-07-13 DIAGNOSIS — N39.0 ACUTE LOWER UTI: ICD-10-CM

## 2024-07-13 DIAGNOSIS — R07.9 NONSPECIFIC CHEST PAIN: ICD-10-CM

## 2024-07-13 LAB
ALBUMIN SERPL BCG-MCNC: 4 G/DL (ref 3.5–5.2)
ALBUMIN UR-MCNC: NEGATIVE MG/DL
ALP SERPL-CCNC: 127 U/L (ref 40–150)
ALT SERPL W P-5'-P-CCNC: 14 U/L (ref 0–70)
ANION GAP SERPL CALCULATED.3IONS-SCNC: 11 MMOL/L (ref 7–15)
APPEARANCE UR: CLEAR
AST SERPL W P-5'-P-CCNC: 14 U/L (ref 0–45)
BASOPHILS # BLD AUTO: 0.1 10E3/UL (ref 0–0.2)
BASOPHILS NFR BLD AUTO: 0 %
BILIRUB DIRECT SERPL-MCNC: <0.2 MG/DL (ref 0–0.3)
BILIRUB SERPL-MCNC: 0.5 MG/DL
BILIRUB UR QL STRIP: NEGATIVE
BUN SERPL-MCNC: 12.7 MG/DL (ref 8–23)
CALCIUM SERPL-MCNC: 9.1 MG/DL (ref 8.8–10.2)
CHLORIDE SERPL-SCNC: 94 MMOL/L (ref 98–107)
COLOR UR AUTO: YELLOW
CREAT SERPL-MCNC: 0.81 MG/DL (ref 0.67–1.17)
DEPRECATED HCO3 PLAS-SCNC: 23 MMOL/L (ref 22–29)
EGFRCR SERPLBLD CKD-EPI 2021: 87 ML/MIN/1.73M2
EOSINOPHIL # BLD AUTO: 0.2 10E3/UL (ref 0–0.7)
EOSINOPHIL NFR BLD AUTO: 1 %
ERYTHROCYTE [DISTWIDTH] IN BLOOD BY AUTOMATED COUNT: 14.9 % (ref 10–15)
FLUAV RNA SPEC QL NAA+PROBE: NEGATIVE
FLUBV RNA RESP QL NAA+PROBE: NEGATIVE
GLUCOSE SERPL-MCNC: 147 MG/DL (ref 70–99)
GLUCOSE UR STRIP-MCNC: NEGATIVE MG/DL
HCT VFR BLD AUTO: 37.3 % (ref 40–53)
HGB BLD-MCNC: 12.5 G/DL (ref 13.3–17.7)
HGB UR QL STRIP: NEGATIVE
HOLD SPECIMEN: NORMAL
HOLD SPECIMEN: NORMAL
IMM GRANULOCYTES # BLD: 0 10E3/UL
IMM GRANULOCYTES NFR BLD: 0 %
KETONES UR STRIP-MCNC: NEGATIVE MG/DL
LEUKOCYTE ESTERASE UR QL STRIP: ABNORMAL
LIPASE SERPL-CCNC: 54 U/L (ref 13–60)
LYMPHOCYTES # BLD AUTO: 8.1 10E3/UL (ref 0.8–5.3)
LYMPHOCYTES NFR BLD AUTO: 56 %
MCH RBC QN AUTO: 29.1 PG (ref 26.5–33)
MCHC RBC AUTO-ENTMCNC: 33.5 G/DL (ref 31.5–36.5)
MCV RBC AUTO: 87 FL (ref 78–100)
MONOCYTES # BLD AUTO: 0.7 10E3/UL (ref 0–1.3)
MONOCYTES NFR BLD AUTO: 5 %
NEUTROPHILS # BLD AUTO: 5.4 10E3/UL (ref 1.6–8.3)
NEUTROPHILS NFR BLD AUTO: 37 %
NITRATE UR QL: NEGATIVE
NRBC # BLD AUTO: 0 10E3/UL
NRBC BLD AUTO-RTO: 0 /100
PH UR STRIP: 6 [PH] (ref 5–7)
PLAT MORPH BLD: NORMAL
PLATELET # BLD AUTO: 178 10E3/UL (ref 150–450)
POTASSIUM SERPL-SCNC: 4.4 MMOL/L (ref 3.4–5.3)
PROT SERPL-MCNC: 7 G/DL (ref 6.4–8.3)
RBC # BLD AUTO: 4.3 10E6/UL (ref 4.4–5.9)
RBC MORPH BLD: NORMAL
RBC URINE: 2 /HPF
RSV RNA SPEC NAA+PROBE: NEGATIVE
SARS-COV-2 RNA RESP QL NAA+PROBE: NEGATIVE
SODIUM SERPL-SCNC: 128 MMOL/L (ref 135–145)
SP GR UR STRIP: 1.01 (ref 1–1.03)
TROPONIN T SERPL HS-MCNC: 15 NG/L
TROPONIN T SERPL HS-MCNC: 17 NG/L
UROBILINOGEN UR STRIP-MCNC: NORMAL MG/DL
WBC # BLD AUTO: 14.4 10E3/UL (ref 4–11)
WBC URINE: 31 /HPF

## 2024-07-13 PROCEDURE — 81001 URINALYSIS AUTO W/SCOPE: CPT | Performed by: STUDENT IN AN ORGANIZED HEALTH CARE EDUCATION/TRAINING PROGRAM

## 2024-07-13 PROCEDURE — 71045 X-RAY EXAM CHEST 1 VIEW: CPT

## 2024-07-13 PROCEDURE — 87086 URINE CULTURE/COLONY COUNT: CPT | Performed by: STUDENT IN AN ORGANIZED HEALTH CARE EDUCATION/TRAINING PROGRAM

## 2024-07-13 PROCEDURE — 85025 COMPLETE CBC W/AUTO DIFF WBC: CPT | Performed by: STUDENT IN AN ORGANIZED HEALTH CARE EDUCATION/TRAINING PROGRAM

## 2024-07-13 PROCEDURE — 99285 EMERGENCY DEPT VISIT HI MDM: CPT | Mod: 25 | Performed by: STUDENT IN AN ORGANIZED HEALTH CARE EDUCATION/TRAINING PROGRAM

## 2024-07-13 PROCEDURE — 93010 ELECTROCARDIOGRAM REPORT: CPT | Performed by: STUDENT IN AN ORGANIZED HEALTH CARE EDUCATION/TRAINING PROGRAM

## 2024-07-13 PROCEDURE — 83690 ASSAY OF LIPASE: CPT | Performed by: STUDENT IN AN ORGANIZED HEALTH CARE EDUCATION/TRAINING PROGRAM

## 2024-07-13 PROCEDURE — 258N000003 HC RX IP 258 OP 636: Performed by: STUDENT IN AN ORGANIZED HEALTH CARE EDUCATION/TRAINING PROGRAM

## 2024-07-13 PROCEDURE — 87637 SARSCOV2&INF A&B&RSV AMP PRB: CPT | Performed by: STUDENT IN AN ORGANIZED HEALTH CARE EDUCATION/TRAINING PROGRAM

## 2024-07-13 PROCEDURE — 96360 HYDRATION IV INFUSION INIT: CPT | Performed by: STUDENT IN AN ORGANIZED HEALTH CARE EDUCATION/TRAINING PROGRAM

## 2024-07-13 PROCEDURE — 80053 COMPREHEN METABOLIC PANEL: CPT | Performed by: STUDENT IN AN ORGANIZED HEALTH CARE EDUCATION/TRAINING PROGRAM

## 2024-07-13 PROCEDURE — 99284 EMERGENCY DEPT VISIT MOD MDM: CPT | Performed by: STUDENT IN AN ORGANIZED HEALTH CARE EDUCATION/TRAINING PROGRAM

## 2024-07-13 PROCEDURE — 93005 ELECTROCARDIOGRAM TRACING: CPT | Performed by: STUDENT IN AN ORGANIZED HEALTH CARE EDUCATION/TRAINING PROGRAM

## 2024-07-13 PROCEDURE — 84484 ASSAY OF TROPONIN QUANT: CPT | Mod: 91 | Performed by: STUDENT IN AN ORGANIZED HEALTH CARE EDUCATION/TRAINING PROGRAM

## 2024-07-13 PROCEDURE — 250N000013 HC RX MED GY IP 250 OP 250 PS 637: Performed by: STUDENT IN AN ORGANIZED HEALTH CARE EDUCATION/TRAINING PROGRAM

## 2024-07-13 PROCEDURE — 36415 COLL VENOUS BLD VENIPUNCTURE: CPT | Performed by: STUDENT IN AN ORGANIZED HEALTH CARE EDUCATION/TRAINING PROGRAM

## 2024-07-13 RX ORDER — ASPIRIN 81 MG/1
324 TABLET, CHEWABLE ORAL ONCE
Status: COMPLETED | OUTPATIENT
Start: 2024-07-13 | End: 2024-07-13

## 2024-07-13 RX ORDER — ACETAMINOPHEN 325 MG/1
650 TABLET ORAL DAILY PRN
COMMUNITY

## 2024-07-13 RX ORDER — CEPHALEXIN 500 MG/1
500 CAPSULE ORAL 2 TIMES DAILY
Qty: 10 CAPSULE | Refills: 0 | Status: SHIPPED | OUTPATIENT
Start: 2024-07-13 | End: 2024-07-16

## 2024-07-13 RX ORDER — ASPIRIN 81 MG/1
324 TABLET, CHEWABLE ORAL ONCE
Status: DISCONTINUED | OUTPATIENT
Start: 2024-07-13 | End: 2024-07-13

## 2024-07-13 RX ORDER — MAGNESIUM OXIDE 420 MG/1
420 TABLET ORAL DAILY
COMMUNITY
Start: 2023-11-27

## 2024-07-13 RX ADMIN — ASPIRIN 81 MG 243 MG: 81 TABLET ORAL at 10:11

## 2024-07-13 RX ADMIN — SODIUM CHLORIDE 1000 ML: 9 INJECTION, SOLUTION INTRAVENOUS at 11:03

## 2024-07-13 ASSESSMENT — ACTIVITIES OF DAILY LIVING (ADL)
ADLS_ACUITY_SCORE: 38

## 2024-07-13 NOTE — ED PROVIDER NOTES
History     Chief Complaint   Patient presents with    Shoulder Pain     HPI  Brannon Boggs is a 84 year old male with complex medical history including hypertension, diabetes, coronary disease with stent, A-fib s/p pacemaker who presents for evaluation of chest pain.  Pain started this morning around 8 AM while at rest.  It radiates into his right shoulder and into the right jaw/neck.  No obvious provoking or palliating factors.  He does state it feels similar to a prior MI.  Patient denies any pain with movement of the right shoulder or any recent injuries.  He further denies any fever, chills, cough or cold symptoms, abdominal pain or vomiting, new swelling of the legs, other complaints today.    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  Pers. Hx of exposure to agent orange      Hypercholesteremia 04/06/2011     Priority: Medium    Essential hypertension 07/19/2005      Priority: Medium     Formatting of this note might be different from the original.  Borderline  IMO Update        Past Medical History:    History reviewed. No pertinent past medical history.    Past Surgical History:    Past Surgical History:   Procedure Laterality Date    ENDOSCOPIC RETROGRADE CHOLANGIOPANCREATOGRAM N/A 11/18/2023    Procedure: ENDOSCOPIC RETROGRADE CHOLANGIOPANCREATOGRAPHY, WITH STENT PLACEMENT;  Surgeon: Jose Morse MD;  Location:  OR     Family History:    No family history on file.    Social History:  Marital Status:   [2]  Social History     Tobacco Use    Smoking status: Former    Smokeless tobacco: Never   Vaping Use    Vaping status: Never Used   Substance Use Topics    Alcohol use: Not Currently    Drug use: Not Currently      Medications:    acetaminophen (TYLENOL) 325 MG tablet  aspirin (ASA) 81 MG EC tablet  atorvastatin (LIPITOR) 40 MG tablet  cephALEXin (KEFLEX) 500 MG capsule  cyanocobalamin (VITAMIN B-12) 1000 MCG tablet  EnovaRX-Lidocaine HCl 5 % cream  famotidine (PEPCID) 20 MG tablet  gabapentin (NEURONTIN) 300 MG capsule  insulin glargine (LANTUS PEN) 100 UNIT/ML pen  levothyroxine (SYNTHROID/LEVOTHROID) 50 MCG tablet  Magnesium Oxide 420 MG TABS  metFORMIN (GLUCOPHAGE XR) 500 MG 24 hr tablet  nitroGLYcerin (NITROSTAT) 0.4 MG sublingual tablet  pantoprazole (PROTONIX) 40 MG EC tablet  Psyllium (NATURAL FIBER THERAPY) 48.57 % POWD  senna (SENOKOT) 8.6 MG tablet  vitamin C (ASCORBIC ACID) 500 MG tablet  Vitamin D3 (CHOLECALCIFEROL) 25 mcg (1000 units) tablet  zinc sulfate (ZINCATE) 220 (50 Zn) MG capsule      Review of Systems   All other systems reviewed and are negative.  See HPI.    Physical Exam   BP: (!) 169/66  Pulse: 65  Temp: 100.4  F (38  C)  Resp: 11  Weight: 104.8 kg (231 lb)  SpO2: 97 %    Physical Exam  Vitals and nursing note reviewed.   Constitutional:       General: He is not in acute distress.     Appearance: Normal appearance. He is obese.  He is not toxic-appearing.      Comments: Sitting comfortably on exam bed.  No distress.   HENT:      Head: Normocephalic and atraumatic.      Mouth/Throat:      Mouth: Mucous membranes are moist.   Eyes:      General: No scleral icterus.     Extraocular Movements: Extraocular movements intact.      Conjunctiva/sclera: Conjunctivae normal.      Pupils: Pupils are equal, round, and reactive to light.   Cardiovascular:      Rate and Rhythm: Normal rate and regular rhythm.      Pulses: Normal pulses.      Heart sounds: Normal heart sounds.      Comments: Paced rhythm on monitor, borderline bradycardic.  Pulmonary:      Effort: Pulmonary effort is normal. No respiratory distress.      Breath sounds: Normal breath sounds.   Abdominal:      Palpations: Abdomen is soft.      Tenderness: There is no abdominal tenderness. There is no guarding.   Musculoskeletal:         General: No tenderness or deformity. Normal range of motion.      Cervical back: Normal range of motion and neck supple.      Right lower leg: No edema.      Left lower leg: No edema.      Comments: Patient does not have any tenderness to the shoulder.  Range of motion is normal.  Pulses are equal in all extremities.   Skin:     General: Skin is warm.      Capillary Refill: Capillary refill takes less than 2 seconds.      Coloration: Skin is not pale.   Neurological:      General: No focal deficit present.      Mental Status: He is alert and oriented to person, place, and time.   Psychiatric:         Mood and Affect: Mood normal.         ED Course        Procedures         EKG performed at 9:43 AM.  Paced rhythm with borderline bradycardia, rate 59.  Left axis deviation.  Left bundle branch block pattern for paced rhythm.  Nonspecific ST and T wave changes with no obvious signs of ischemia.  Very similar to previous EKGs.  Exam independently interpreted by me.       Results for orders placed or performed during the hospital encounter of 07/13/24 (from the  past 24 hour(s))   CBC with Platelets & Differential    Narrative    The following orders were created for panel order CBC with Platelets & Differential.  Procedure                               Abnormality         Status                     ---------                               -----------         ------                     CBC with platelets and d...[[id number]]  Abnormal            Final result               RBC and Platelet Morphology[[id number]]                      Final result                 Please view results for these tests on the individual orders.   Basic metabolic panel   Result Value Ref Range    Sodium 128 (L) 135 - 145 mmol/L    Potassium 4.4 3.4 - 5.3 mmol/L    Chloride 94 (L) 98 - 107 mmol/L    Carbon Dioxide (CO2) 23 22 - 29 mmol/L    Anion Gap 11 7 - 15 mmol/L    Urea Nitrogen 12.7 8.0 - 23.0 mg/dL    Creatinine 0.81 0.67 - 1.17 mg/dL    GFR Estimate 87 >60 mL/min/1.73m2    Calcium 9.1 8.8 - 10.2 mg/dL    Glucose 147 (H) 70 - 99 mg/dL   Troponin T, High Sensitivity   Result Value Ref Range    Troponin T, High Sensitivity 17 <=22 ng/L   [place] Draw    Narrative    The following orders were created for panel order [place] Draw.  Procedure                               Abnormality         Status                     ---------                               -----------         ------                     Extra Red Top Tube[[id number]]                               Final result               Extra Green Top (Lithium...[[id number]]                      Final result               Extra Purple Top Tube[[id number]]                                                         Please view results for these tests on the individual orders.   Lipase   Result Value Ref Range    Lipase 54 13 - 60 U/L   CBC with platelets and differential   Result Value Ref Range    WBC Count 14.4 (H) 4.0 - 11.0 10e3/uL    RBC Count 4.30 (L) 4.40 - 5.90 10e6/uL    Hemoglobin 12.5 (L) 13.3 - 17.7 g/dL    Hematocrit 37.3 (L) 40.0 - 53.0 %     MCV 87 78 - 100 fL    MCH 29.1 26.5 - 33.0 pg    MCHC 33.5 31.5 - 36.5 g/dL    RDW 14.9 10.0 - 15.0 %    Platelet Count 178 150 - 450 10e3/uL    % Neutrophils 37 %    % Lymphocytes 56 %    % Monocytes 5 %    % Eosinophils 1 %    % Basophils 0 %    % Immature Granulocytes 0 %    NRBCs per 100 WBC 0 <1 /100    Absolute Neutrophils 5.4 1.6 - 8.3 10e3/uL    Absolute Lymphocytes 8.1 (H) 0.8 - 5.3 10e3/uL    Absolute Monocytes 0.7 0.0 - 1.3 10e3/uL    Absolute Eosinophils 0.2 0.0 - 0.7 10e3/uL    Absolute Basophils 0.1 0.0 - 0.2 10e3/uL    Absolute Immature Granulocytes 0.0 <=0.4 10e3/uL    Absolute NRBCs 0.0 10e3/uL   Extra Red Top Tube   Result Value Ref Range    Hold Specimen [facility]    Extra Green Top (Lithium Heparin) Tube   Result Value Ref Range    Hold Specimen [facility]    Hepatic function panel   Result Value Ref Range    Protein Total 7.0 6.4 - 8.3 g/dL    Albumin 4.0 3.5 - 5.2 g/dL    Bilirubin Total 0.5 <=1.2 mg/dL    Alkaline Phosphatase 127 40 - 150 U/L    AST 14 0 - 45 U/L    ALT 14 0 - 70 U/L    Bilirubin Direct <0.20 0.00 - 0.30 mg/dL   RBC and Platelet Morphology   Result Value Ref Range    RBC Morphology Confirmed RBC Indices     Platelet Assessment  Automated Count Confirmed. Platelet morphology is normal.     Automated Count Confirmed. Platelet morphology is normal.   XR Chest Port 1 View    Narrative    EXAM: XR CHEST PORT 1 VIEW  LOCATION: [facility]  DATE: 7/13/2024    INDICATION: Chest pain  COMPARISON: 12/16/2012      Impression    IMPRESSION: Left subclavian approach pacing device. Cardiac silhouette within normal limits. No pneumothorax or pleural effusion. No focal consolidation. Right shoulder arthroplasty.   Troponin T, High Sensitivity   Result Value Ref Range    Troponin T, High Sensitivity 15 <=22 ng/L   UA with Microscopic reflex to Culture    Specimen: Urine, Midstream   Result Value Ref Range    Color Urine Yellow Colorless, Straw, Light Yellow, Yellow     Appearance Urine Clear Clear    Glucose Urine Negative Negative mg/dL    Bilirubin Urine Negative Negative    Ketones Urine Negative Negative mg/dL    Specific Gravity Urine 1.011 1.003 - 1.035    Blood Urine Negative Negative    pH Urine 6.0 5.0 - 7.0    Protein Albumin Urine Negative Negative mg/dL    Urobilinogen Urine Normal Normal, 2.0 mg/dL    Nitrite Urine Negative Negative    Leukocyte Esterase Urine Moderate (A) Negative    RBC Urine 2 <=2 /HPF    WBC Urine 31 (H) <=5 /HPF    Narrative    Urine Culture ordered based on laboratory criteria   Symptomatic Influenza A/B, RSV, & SARS-CoV2 PCR (COVID-19) Nose    Specimen: Nose; Swab   Result Value Ref Range    Influenza A PCR Negative Negative    Influenza B PCR Negative Negative    RSV PCR Negative Negative    SARS CoV2 PCR Negative Negative    Narrative    Testing was performed using the Xpert Xpress CoV2/Flu/RSV Assay on the Cepheid GeneXpert Instrument. This test should be ordered for the detection of SARS-CoV-2, influenza, and RSV viruses in individuals who meet clinical and/or epidemiological criteria. Test performance is unknown in asymptomatic patients. This test is for in vitro diagnostic use under the FDA EUA for laboratories certified under CLIA to perform high or moderate complexity testing. This test has not been FDA cleared or approved. A negative result does not rule out the presence of PCR inhibitors in the specimen or target RNA in concentration below the limit of detection for the assay. If only one viral target is positive but coinfection with multiple targets is suspected, the sample should be re-tested with another FDA cleared, approved, or authorized test, if coinfection would change clinical management. This test was validated by the [facility]. These laboratories are certified under the Clinical Laboratory Improvement Amendments of 1988 (CLIA-88) as qualified to perform high complexity laboratory testing.        Medications   aspirin (ASA) chewable tablet 324 mg (243 mg Oral $Given 7/13/24 1011)   sodium chloride 0.9% BOLUS 1,000 mL (0 mLs Intravenous Stopped 7/13/24 1230)       Assessments & Plan (with Medical Decision Making)     I have reviewed the nursing notes.    I have reviewed the findings, diagnosis, plan and need for follow up with the patient.  Medical Decision Making  Brannon Boggs is a 84 year old male with complex medical history including hypertension, diabetes, coronary disease with stent, A-fib s/p pacemaker who presents for evaluation of chest pain.  Low-grade fever, hypertensive, vitals otherwise normal.  Patient appears nontoxic.  Lungs are clear to auscultation.  He did not have any obvious reproducible tenderness to the chest wall or shoulder, range of motion of the shoulder itself was normal as well.  Pulses were equal throughout.  The cause of his pain is unclear, could be cardiac in nature given similar pain in the past, but also may represent musculoskeletal pain/arthritis.  Patient was given aspirin on arrival.  EKG showed a chronic left bundle branch block/paced rhythm without significant changes from prior.  Troponin was negative x 2.  He was mildly hyponatremic but electrolytes were otherwise normal.  Patient also had mild leukocytosis at 14.4 of unclear etiology.  Given his low-grade fever as well we expanded workup to include looking for an infectious source.  Viral panel was negative.  X-ray was clear.  Urinalysis later showed 31 WBCs and moderate leukocyte esterase.  Patient denies having any obvious urinary symptoms, but without any other source, will treat with antibiotics.  With reassuring cardiac workup, I believe his pain is probably musculoskeletal in nature.  Recommended Tylenol for that.  Patient will follow-up with his primary doctor and agrees to return sooner for any new or worsening symptoms.    Discharge Medication List as of 7/13/2024  1:29 PM        START taking  these medications    Details   cephALEXin (KEFLEX) 500 MG capsule Take 1 capsule (500 mg) by mouth 2 times daily for 5 days, Disp-10 capsule, R-0, E-Prescribe           Final diagnoses:   Nonspecific chest pain   Acute lower UTI     7/13/2024   [facility] EMERGENCY DEPT       Dimitri Arteaga MD  07/13/24 8390

## 2024-07-13 NOTE — DISCHARGE INSTRUCTIONS
The cause of your chest/shoulder pain is unclear.  I do not see any signs of heart or lung stress.I think this is probably from a muscle spasm/irritation or arthritis to the shoulder.  You can take Tylenol/ibuprofen for that.    You were found to have a low-grade fever and also signs of a urinary tract infection.  Prescription for an antibiotic was sent to the pharmacy.  Take this as instructed until entirely gone.    Follow-up with your primary doctor for recheck.  Return to the emergency department sooner for any new or acutely worsening symptoms.

## 2024-07-13 NOTE — MEDICATION SCRIBE - ADMISSION MEDICATION HISTORY
Medication Scribe Admission Medication History    Admission medication history is complete. The information provided in this note is only as accurate as the sources available at the time of the update.    Information Source(s): Patient, Family member, and CareEverywhere/SureScripts via in-person    Pertinent Information: spouse Rossana vizcaino meds, accessed DOD current med list    Changes made to PTA medication list:  Added: None  Deleted: None  Changed: lidocaine patch to cream, mag ox to once daily 420 strength, lantus to 30 units at bedtime, pepcid to twice daily, tylenol to 325 strength, lipitor to morning, gabapentin to 900mg twice daily (up from 600mg twice daily)    Allergies reviewed with patient and updates made in EHR: yes    Medication History Completed By: OLINDA LACY 7/13/2024 11:40 AM    PTA Med List   Medication Sig Note Last Dose    acetaminophen (TYLENOL) 325 MG tablet Take 650 mg by mouth daily as needed for fever or pain  7/12/2024 at am    aspirin (ASA) 81 MG EC tablet Take 81 mg by mouth daily  7/13/2024 at 0830    atorvastatin (LIPITOR) 40 MG tablet Take 1 tablet (40 mg) by mouth at bedtime (Patient taking differently: Take 40 mg by mouth daily)  7/13/2024 at 0830    cyanocobalamin (VITAMIN B-12) 1000 MCG tablet Take 1,000 mcg by mouth daily  7/13/2024 at 0830    EnovaRX-Lidocaine HCl 5 % cream Apply topically 4 times daily as needed (shoulder pain)  7/12/2024 at hs    famotidine (PEPCID) 20 MG tablet Take 2 tablets (40 mg) by mouth At Bedtime (Patient taking differently: Take 40 mg by mouth 2 times daily)  7/13/2024 at 0830    gabapentin (NEURONTIN) 300 MG capsule Take 900 mg by mouth 2 times daily Morning and bedtime  7/13/2024 at 0830    insulin glargine (LANTUS PEN) 100 UNIT/ML pen Inject 28 Units Subcutaneous every morning (Patient taking differently: Inject 30 Units subcutaneously at bedtime)  7/12/2024 at hs    levothyroxine (SYNTHROID/LEVOTHROID) 50 MCG tablet Take 50 mcg by mouth daily  before breakfast  7/13/2024 at 0730    Magnesium Oxide 420 MG TABS Take 420 mg by mouth daily  7/13/2024 at 0830    metFORMIN (GLUCOPHAGE XR) 500 MG 24 hr tablet Take 1,000 mg by mouth 2 times daily (with meals)  7/13/2024 at 0830    nitroGLYcerin (NITROSTAT) 0.4 MG sublingual tablet Place 0.4 mg under the tongue every 5 minutes as needed for chest pain 7/13/2024: Last used summer 2023 More than a month at on hand    pantoprazole (PROTONIX) 40 MG EC tablet Take 40 mg by mouth daily 1/2 hour before eating  7/13/2024 at 0730    Psyllium (NATURAL FIBER THERAPY) 48.57 % POWD Take 3 tsp by mouth daily as needed (constipation)  Past Week at am    senna (SENOKOT) 8.6 MG tablet Take 17.2 mg by mouth At Bedtime  7/12/2024 at hs    vitamin C (ASCORBIC ACID) 500 MG tablet Take 500 mg by mouth daily  7/13/2024 at 0830    Vitamin D3 (CHOLECALCIFEROL) 25 mcg (1000 units) tablet Take 1 tablet by mouth daily  7/13/2024 at 0830    zinc sulfate (ZINCATE) 220 (50 Zn) MG capsule Take 50 mg by mouth daily  7/13/2024 at 0830

## 2024-07-13 NOTE — ED TRIAGE NOTES
Patient reports chest pain and shoulder pain alternating since 0800,     Triage Assessment (Adult)       Row Name 07/13/24 0942          Triage Assessment    Airway WDL WDL        Respiratory WDL    Respiratory WDL WDL        Skin Circulation/Temperature WDL    Skin Circulation/Temperature WDL WDL        Cardiac WDL    Cardiac WDL X;chest pain

## 2024-07-16 ENCOUNTER — TELEPHONE (OUTPATIENT)
Dept: NURSING | Facility: CLINIC | Age: 84
End: 2024-07-16
Payer: COMMERCIAL

## 2024-07-16 DIAGNOSIS — N39.0 URINARY TRACT INFECTION: Primary | ICD-10-CM

## 2024-07-16 LAB
BACTERIA UR CULT: ABNORMAL
BACTERIA UR CULT: ABNORMAL

## 2024-07-16 NOTE — TELEPHONE ENCOUNTER
"[facility] ED    Reason for call: Lab Result Notification     Lab Result (including Rx patient on, if applicable).  If culture, copy of lab report at bottom.  Lab Result: Urine Culture - See Below    ED Rx: cephALEXin (KEFLEX) 500 MG capsule - Take 1 capsule (500 mg) by mouth 2 times daily for 5 days NOT TESTED/RESISTANT     Creatinine Level (mg/dl)   Creatinine   Date Value Ref Range Status   07/13/2024 0.81 0.67 - 1.17 mg/dL Final   06/01/2021 0.97 0.66 - 1.25 mg/dL Final    Creatinine clearance (ml/min), if applicable    Serum creatinine: 0.81 mg/dL 07/13/24 0957  Estimated creatinine clearance: 86.5 mL/min     RN Recommendations/Instructions per [place] ED lab result protocol:   [facility] ED lab result protocol utilized: Urine Culture  Advise to discontinue current antibiotic.   Instruct to start antibiotic, sent to preferred pharmacy.       Patient's current Symptoms:   Patient calling back.  Patient states that he is feeling \"ok\". States he is still having urinary frequency.  Denies any new or worsening symptoms.  Advised of antibiotic change.     Allergic to ATBs: None  Breastfeeding: N/A  Pregnant: N/A  On Coumadin/Warfarin: No  Had any urinary procedures or have urinary   retention, neurogenic bladder, or use a catheter: Yes, bladder cancer    Patient/care giver notified to contact your PCP clinic or return to the Emergency department if your:  Symptoms return.  Symptoms do not improve after 3 days on antibiotic.  Symptoms do not resolve after completing antibiotic.  Symptoms worsen or other concerning symptoms.       SCARLET OSWALD RN  "

## 2024-07-16 NOTE — TELEPHONE ENCOUNTER
[facility]    Reason for call: Lab Result Notification     Lab Result (including Rx patient on, if applicable).  If culture, copy of lab report at bottom.  Lab Result: See below  cephALEXin (KEFLEX) 500 MG capsule - Take 1 capsule (500 mg) by mouth 2 times daily for 5 days NOT TESTED/RESISTANT  Creatinine Level (mg/dl)   Creatinine   Date Value Ref Range Status   07/13/2024 0.81 0.67 - 1.17 mg/dL Final   06/01/2021 0.97 0.66 - 1.25 mg/dL Final    Creatinine clearance (ml/min), if applicable    Serum creatinine: 0.81 mg/dL 07/13/24 0957  Estimated creatinine clearance: 86.5 mL/min     RN Recommendations/Instructions per [place] ED lab result protocol:   [facility] ED lab result protocol utilized: urine culture  Augmentin seems like a good choice to switch to.       Spoke with wife. No consent on file. Patient is resting currently. Left message with wife for patient to call us back.       Dimitri Grace, RN

## 2024-08-09 ENCOUNTER — HOSPITAL ENCOUNTER (OUTPATIENT)
Dept: CARDIOLOGY | Facility: CLINIC | Age: 84
Discharge: HOME | End: 2024-08-09
Attending: INTERNAL MEDICINE | Admitting: INTERNAL MEDICINE
Payer: COMMERCIAL

## 2024-08-09 DIAGNOSIS — I42.9 CARDIOMYOPATHY, UNSPECIFIED (H): ICD-10-CM

## 2024-08-09 LAB — LVEF ECHO: NORMAL

## 2024-08-09 PROCEDURE — 93306 TTE W/DOPPLER COMPLETE: CPT | Mod: 26 | Performed by: INTERNAL MEDICINE

## 2024-08-09 PROCEDURE — 999N000208 ECHOCARDIOGRAM COMPLETE

## 2024-08-09 PROCEDURE — 255N000002 HC RX 255 OP 636: Performed by: INTERNAL MEDICINE

## 2024-08-09 RX ADMIN — HUMAN ALBUMIN MICROSPHERES AND PERFLUTREN 5 ML: 10; .22 INJECTION, SOLUTION INTRAVENOUS at 09:37

## 2024-10-01 ENCOUNTER — APPOINTMENT (OUTPATIENT)
Dept: GENERAL RADIOLOGY | Facility: CLINIC | Age: 84
End: 2024-10-01
Attending: PHYSICIAN ASSISTANT
Payer: COMMERCIAL

## 2024-10-01 ENCOUNTER — HOSPITAL ENCOUNTER (EMERGENCY)
Facility: CLINIC | Age: 84
Discharge: HOME | End: 2024-10-01
Attending: PHYSICIAN ASSISTANT | Admitting: PHYSICIAN ASSISTANT
Payer: COMMERCIAL

## 2024-10-01 VITALS
BODY MASS INDEX: 31.19 KG/M2 | TEMPERATURE: 97.5 F | WEIGHT: 230 LBS | OXYGEN SATURATION: 94 % | SYSTOLIC BLOOD PRESSURE: 177 MMHG | RESPIRATION RATE: 20 BRPM | DIASTOLIC BLOOD PRESSURE: 93 MMHG | HEART RATE: 67 BPM

## 2024-10-01 DIAGNOSIS — H92.01 RIGHT EAR PAIN: ICD-10-CM

## 2024-10-01 DIAGNOSIS — R06.02 SHORTNESS OF BREATH: ICD-10-CM

## 2024-10-01 DIAGNOSIS — G89.29 CHRONIC RIGHT SHOULDER PAIN: ICD-10-CM

## 2024-10-01 DIAGNOSIS — M25.511 CHRONIC RIGHT SHOULDER PAIN: ICD-10-CM

## 2024-10-01 LAB
ALBUMIN SERPL BCG-MCNC: 4.1 G/DL (ref 3.5–5.2)
ALP SERPL-CCNC: 149 U/L (ref 40–150)
ALT SERPL W P-5'-P-CCNC: 11 U/L (ref 0–70)
ANION GAP SERPL CALCULATED.3IONS-SCNC: 12 MMOL/L (ref 7–15)
AST SERPL W P-5'-P-CCNC: 13 U/L (ref 0–45)
BASOPHILS # BLD AUTO: 0.1 10E3/UL (ref 0–0.2)
BASOPHILS NFR BLD AUTO: 0 %
BILIRUB SERPL-MCNC: 0.4 MG/DL
BUN SERPL-MCNC: 17.5 MG/DL (ref 8–23)
CALCIUM SERPL-MCNC: 9.6 MG/DL (ref 8.8–10.4)
CHLORIDE SERPL-SCNC: 98 MMOL/L (ref 98–107)
CREAT SERPL-MCNC: 0.83 MG/DL (ref 0.67–1.17)
EGFRCR SERPLBLD CKD-EPI 2021: 87 ML/MIN/1.73M2
EOSINOPHIL # BLD AUTO: 0.1 10E3/UL (ref 0–0.7)
EOSINOPHIL NFR BLD AUTO: 1 %
ERYTHROCYTE [DISTWIDTH] IN BLOOD BY AUTOMATED COUNT: 14 % (ref 10–15)
GLUCOSE SERPL-MCNC: 229 MG/DL (ref 70–99)
HCO3 SERPL-SCNC: 24 MMOL/L (ref 22–29)
HCT VFR BLD AUTO: 38 % (ref 40–53)
HGB BLD-MCNC: 12.5 G/DL (ref 13.3–17.7)
IMM GRANULOCYTES # BLD: 0.1 10E3/UL
IMM GRANULOCYTES NFR BLD: 0 %
LYMPHOCYTES # BLD AUTO: 10.1 10E3/UL (ref 0.8–5.3)
LYMPHOCYTES NFR BLD AUTO: 53 %
MCH RBC QN AUTO: 28.9 PG (ref 26.5–33)
MCHC RBC AUTO-ENTMCNC: 32.9 G/DL (ref 31.5–36.5)
MCV RBC AUTO: 88 FL (ref 78–100)
MONOCYTES # BLD AUTO: 1.1 10E3/UL (ref 0–1.3)
MONOCYTES NFR BLD AUTO: 6 %
NEUTROPHILS # BLD AUTO: 7.7 10E3/UL (ref 1.6–8.3)
NEUTROPHILS NFR BLD AUTO: 40 %
NRBC # BLD AUTO: 0 10E3/UL
NRBC BLD AUTO-RTO: 0 /100
PLAT MORPH BLD: ABNORMAL
PLATELET # BLD AUTO: 212 10E3/UL (ref 150–450)
POTASSIUM SERPL-SCNC: 4.9 MMOL/L (ref 3.4–5.3)
PROT SERPL-MCNC: 6.8 G/DL (ref 6.4–8.3)
RBC # BLD AUTO: 4.33 10E6/UL (ref 4.4–5.9)
RBC MORPH BLD: ABNORMAL
SODIUM SERPL-SCNC: 134 MMOL/L (ref 135–145)
TROPONIN T SERPL HS-MCNC: 16 NG/L
VARIANT LYMPHS BLD QL SMEAR: PRESENT
WBC # BLD AUTO: 19.2 10E3/UL (ref 4–11)

## 2024-10-01 PROCEDURE — 250N000013 HC RX MED GY IP 250 OP 250 PS 637: Performed by: PHYSICIAN ASSISTANT

## 2024-10-01 PROCEDURE — 84484 ASSAY OF TROPONIN QUANT: CPT | Performed by: PHYSICIAN ASSISTANT

## 2024-10-01 PROCEDURE — 36415 COLL VENOUS BLD VENIPUNCTURE: CPT | Performed by: PHYSICIAN ASSISTANT

## 2024-10-01 PROCEDURE — 99283 EMERGENCY DEPT VISIT LOW MDM: CPT | Performed by: PHYSICIAN ASSISTANT

## 2024-10-01 PROCEDURE — 93005 ELECTROCARDIOGRAM TRACING: CPT | Performed by: PHYSICIAN ASSISTANT

## 2024-10-01 PROCEDURE — 99285 EMERGENCY DEPT VISIT HI MDM: CPT | Mod: 25 | Performed by: PHYSICIAN ASSISTANT

## 2024-10-01 PROCEDURE — 80053 COMPREHEN METABOLIC PANEL: CPT | Performed by: PHYSICIAN ASSISTANT

## 2024-10-01 PROCEDURE — 93010 ELECTROCARDIOGRAM REPORT: CPT | Performed by: PHYSICIAN ASSISTANT

## 2024-10-01 PROCEDURE — 71046 X-RAY EXAM CHEST 2 VIEWS: CPT

## 2024-10-01 PROCEDURE — 85025 COMPLETE CBC W/AUTO DIFF WBC: CPT | Performed by: PHYSICIAN ASSISTANT

## 2024-10-01 RX ORDER — ASPIRIN 81 MG/1
243 TABLET, CHEWABLE ORAL ONCE
Status: COMPLETED | OUTPATIENT
Start: 2024-10-01 | End: 2024-10-01

## 2024-10-01 RX ADMIN — ASPIRIN 243 MG: 81 TABLET, CHEWABLE ORAL at 14:30

## 2024-10-01 ASSESSMENT — COLUMBIA-SUICIDE SEVERITY RATING SCALE - C-SSRS
1. IN THE PAST MONTH, HAVE YOU WISHED YOU WERE DEAD OR WISHED YOU COULD GO TO SLEEP AND NOT WAKE UP?: NO
6. HAVE YOU EVER DONE ANYTHING, STARTED TO DO ANYTHING, OR PREPARED TO DO ANYTHING TO END YOUR LIFE?: NO
2. HAVE YOU ACTUALLY HAD ANY THOUGHTS OF KILLING YOURSELF IN THE PAST MONTH?: NO

## 2024-10-01 ASSESSMENT — ACTIVITIES OF DAILY LIVING (ADL)
ADLS_ACUITY_SCORE: 36
ADLS_ACUITY_SCORE: 38

## 2024-10-01 NOTE — ED TRIAGE NOTES
Pt presents with right ear pain that started today. Pt also having right shoulder pain radiating into arm with numbness to hand Pt has had injection to shoulder      Triage Assessment (Adult)       Row Name 10/01/24 7445          Triage Assessment    Airway WDL WDL        Respiratory WDL    Respiratory WDL WDL        Skin Circulation/Temperature WDL    Skin Circulation/Temperature WDL WDL        Cardiac WDL    Cardiac WDL WDL        Peripheral/Neurovascular WDL    Peripheral Neurovascular WDL WDL        Cognitive/Neuro/Behavioral WDL    Cognitive/Neuro/Behavioral WDL WDL

## 2024-10-01 NOTE — ED PROVIDER NOTES
"  History     Chief Complaint   Patient presents with    Otalgia    Shoulder Pain       HPI  Brannon Boggs is a 84 year old male with a history of insulin-dependent type 2 diabetes, paroxysmal atrial fibrillation s/p pacemaker not currently on anticoagulation, hypertension, coronary artery disease s/p stents, lymphoma, hypothyroidism, who presents to the emergency department complaining of right ear and shoulder pain, chest pain, and shortness of breath.  The patient is here with his wife who assists with history.  Patient states \"I feel like a mess.\"  They were eating lunch today a couple hours ago and he started complaining to his wife that his right ear and shoulder were aching.  Patient states pain was making him feel short of breath in his chest.  When asked if he has chest pain he is unable to definitively answer.  Patient with history of Alzheimer disease, somewhat poor historian with specifics of symptoms.  He reports feeling a little lightheaded but denies headache, nausea or vomiting, or visual changes.  States he was able to ambulate without worsening shortness of breath prior to arrival.  No swelling in the legs.  He took ibuprofen for the ear pain which \"took the edge off.\"  He has a history of right reverse total shoulder arthroplasty.          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  Pers. Hx of exposure to agent orange      Hypercholesteremia 04/06/2011     Priority: Medium    Essential hypertension 07/19/2005     Priority: Medium     Formatting of this note might be different from the original.  Borderline  IMO Update          Past Medical History:    No past medical history on file.    Past Surgical History:    Past Surgical History:   Procedure Laterality Date    ENDOSCOPIC RETROGRADE CHOLANGIOPANCREATOGRAM N/A 11/18/2023    Procedure: ENDOSCOPIC RETROGRADE CHOLANGIOPANCREATOGRAPHY, WITH STENT PLACEMENT;  Surgeon: Jose Morse MD;  Location:  OR       Family History:    No family history on file.    Social History:  Marital Status:   [2]  Social History     Tobacco Use    Smoking status: Former    Smokeless tobacco: Never   Vaping Use    Vaping status: Never Used   Substance Use Topics    Alcohol use: Not Currently    Drug use: Not Currently        Medications:    acetaminophen (TYLENOL) 325 MG tablet  aspirin (ASA) 81 MG EC tablet  atorvastatin (LIPITOR) 40 MG tablet  cyanocobalamin (VITAMIN B-12) 1000 MCG tablet  EnovaRX-Lidocaine HCl 5 % cream  famotidine (PEPCID) 20 MG tablet  gabapentin (NEURONTIN) 300 MG capsule  insulin glargine (LANTUS PEN) 100 UNIT/ML pen  levothyroxine (SYNTHROID/LEVOTHROID) 50 MCG tablet  Magnesium Oxide 420 MG TABS  metFORMIN (GLUCOPHAGE XR) 500 MG 24 hr tablet  nitroGLYcerin (NITROSTAT) 0.4 MG sublingual tablet  pantoprazole (PROTONIX) 40 MG EC tablet  Psyllium (NATURAL FIBER THERAPY) 48.57 % POWD  senna (SENOKOT) 8.6  MG tablet  vitamin C (ASCORBIC ACID) 500 MG tablet  Vitamin D3 (CHOLECALCIFEROL) 25 mcg (1000 units) tablet  zinc sulfate (ZINCATE) 220 (50 Zn) MG capsule          Review of Systems   All other systems reviewed and are negative.          Physical Exam   BP: (!) 177/93  Pulse: 67  Temp: 97.5  F (36.4  C)  Resp: 20  Weight: 104.3 kg (230 lb)  SpO2: 94 %      Physical Exam  Vitals and nursing note reviewed.   Constitutional:       General: He is not in acute distress.     Appearance: Normal appearance. He is not ill-appearing, toxic-appearing or diaphoretic.   HENT:      Head: Normocephalic and atraumatic.      Nose: Nose normal.      Mouth/Throat:      Mouth: Mucous membranes are moist.   Eyes:      Extraocular Movements: Extraocular movements intact.      Conjunctiva/sclera: Conjunctivae normal.      Pupils: Pupils are equal, round, and reactive to light.   Cardiovascular:      Rate and Rhythm: Normal rate and regular rhythm.      Heart sounds: Normal heart sounds.   Pulmonary:      Effort: Pulmonary effort is normal. No respiratory distress.      Breath sounds: Normal breath sounds.   Musculoskeletal:      Cervical back: Neck supple.      Right lower leg: No edema.      Left lower leg: No edema.      Comments: Right shoulder: Tenderness throughout shoulder region.  No obvious deformity.  Normal  strength and radial pulses bilaterally in upper extremities.   Skin:     General: Skin is warm and dry.   Neurological:      General: No focal deficit present.      Mental Status: He is alert. Mental status is at baseline.   Psychiatric:         Mood and Affect: Mood normal.             ED Course        Procedures              EKG Interpretation:      Interpreted by Mansi Baeza PA-C  Time reviewed: 1420  Symptoms at time of EKG: dyspnea, chest pain   Rhythm: paced  Rate: Normal  Axis: Left Axis Deviation  Ectopy: none  Conduction: Paced rhythm with left bundle branch block pattern  ST Segments/ T Waves: No acute  ischemic changes  Q Waves: none  Comparison to prior: Unchanged    Clinical Impression: no acute changes          CT SHOULDER RIGHT W/O CONTRAST:  JENNA GALINDO  -1940 M  Exm Date: SEP 19, 2024@08:53  Req Phys: WEILERLAURA JEANNINE Sherly Loc: [facility] CVT PRIMARY CARE [id number] (Req'  Img Loc: CT IMAGING  Service: [place] [address]  [phone]      (Case [id number] COMPLETE) CT SHOULDER RIGHT W/O CONTRAST (CT Detailed) CPT:68729  Reason for Study: Rt. Shoulder pain, abnormal X-ray. With Metal  reduction    Clinical History:  Per Joint Commission Standards, by signing this diagnostic  imaging request the ordering provider confirms they have  considered patients age and recent imaging history.    Defer to radiologist for final CT protocol.    Contact number for responsible provider who can be reached for  any questions or notifications of critical findings: K Weiler  [phone]    If ordering provider is a trainee, enter the name and contact  information of the responsible staff physician. NA    LAST 3: Collection DT Specimen Test Name Result  Units Ref Range 2024 11:42 PLASMA CREATININE  0.8 mg/dL 0.7 - 1.2 2024 10:43 PLASMA  CREATININE 0.9 mg/dL 0.7 - 1.2 2024  05:30 PLASMA CREATININE 0.8 mg/dL 0.7 -  1.2 2024 11:42 PLASMA .CREAT EGFR(CKD-E 88  Ref: >=60 2024 10:43 PLASMA .CREAT EGFR(CKD-E  85 Ref: >=60 2024 05:30 PLASMA .CREAT  EGFR(CKD-E 88 Ref: >=60    Allergies: ([facility] only) Patient has answered NKA        Report Status: Verified Date Reported: SEP 19, 2024  Date Verified: SEP 19, 2024  Verifier E-Sig:/ES/RADHA LAZO MD    Report:  EXAMINATION: CT SHOULDER RIGHT W/O CONTRAST 2024 8:53 AM    INDICATION: 84-year-old male. Rt. Shoulder pain, abnormal X-ray.    TECHNIQUE: Thin section axial imaging without IV contrast. Metal  reduction. Sagittal and coronal reconstructions.    COMPARISON: Plain Films Right Shoulder 2024.  CT Right  Shoulder 3/7/2023.    Total DLP: 277.76 mGycm      Impression:  Old fracture of the right scapula near the junction of the  posterior scapular spine and posterior acromion. This fracture  was not present on the prior CT shoulder exam dated 3/7/2023.  There is lateral downsloping of the acromion. Associated  distraction of the AC joint. Moderate degenerative changes and  hypertrophy of the AC joint. Small old ossification along the  anterior aspect of the AC joint. Right reverse TSA. Components  appear well seated. No significant joint effusions. There  appears to be some atrophy of the mid deltoid muscle. Atrophy of  the rotator cuff musculature. No acute fractures identified.           Results for orders placed or performed during the hospital encounter of 10/01/24 (from the past 24 hour(s))   CBC with platelets differential    Narrative    The following orders were created for panel order CBC with platelets differential.  Procedure                               Abnormality         Status                     ---------                               -----------         ------                     CBC with platelets and d...[[id number]]  Abnormal            Final result               RBC and Platelet Morphology[[id number]]  Abnormal            Final result                 Please view results for these tests on the individual orders.   Comprehensive metabolic panel   Result Value Ref Range    Sodium 134 (L) 135 - 145 mmol/L    Potassium 4.9 3.4 - 5.3 mmol/L    Carbon Dioxide (CO2) 24 22 - 29 mmol/L    Anion Gap 12 7 - 15 mmol/L    Urea Nitrogen 17.5 8.0 - 23.0 mg/dL    Creatinine 0.83 0.67 - 1.17 mg/dL    GFR Estimate 87 >60 mL/min/1.73m2    Calcium 9.6 8.8 - 10.4 mg/dL    Chloride 98 98 - 107 mmol/L    Glucose 229 (H) 70 - 99 mg/dL    Alkaline Phosphatase 149 40 - 150 U/L    AST 13 0 - 45 U/L    ALT 11 0 - 70 U/L    Protein Total 6.8 6.4 - 8.3 g/dL    Albumin 4.1 3.5 - 5.2 g/dL    Bilirubin Total 0.4 <=1.2  mg/dL   Troponin T, High Sensitivity   Result Value Ref Range    Troponin T, High Sensitivity 16 <=22 ng/L   CBC with platelets and differential   Result Value Ref Range    WBC Count 19.2 (H) 4.0 - 11.0 10e3/uL    RBC Count 4.33 (L) 4.40 - 5.90 10e6/uL    Hemoglobin 12.5 (L) 13.3 - 17.7 g/dL    Hematocrit 38.0 (L) 40.0 - 53.0 %    MCV 88 78 - 100 fL    MCH 28.9 26.5 - 33.0 pg    MCHC 32.9 31.5 - 36.5 g/dL    RDW 14.0 10.0 - 15.0 %    Platelet Count 212 150 - 450 10e3/uL    % Neutrophils 40 %    % Lymphocytes 53 %    % Monocytes 6 %    % Eosinophils 1 %    % Basophils 0 %    % Immature Granulocytes 0 %    NRBCs per 100 WBC 0 <1 /100    Absolute Neutrophils 7.7 1.6 - 8.3 10e3/uL    Absolute Lymphocytes 10.1 (H) 0.8 - 5.3 10e3/uL    Absolute Monocytes 1.1 0.0 - 1.3 10e3/uL    Absolute Eosinophils 0.1 0.0 - 0.7 10e3/uL    Absolute Basophils 0.1 0.0 - 0.2 10e3/uL    Absolute Immature Granulocytes 0.1 <=0.4 10e3/uL    Absolute NRBCs 0.0 10e3/uL   RBC and Platelet Morphology   Result Value Ref Range    RBC Morphology Confirmed RBC Indices     Platelet Assessment  Automated Count Confirmed. Platelet morphology is normal.     Automated Count Confirmed. Platelet morphology is normal.    Reactive Lymphocytes Present (A) None Seen   XR Chest 2 Views    Narrative    XR CHEST 2 VIEWS   10/1/2024 2:54 PM     HISTORY: chest pain    COMPARISON: Chest radiograph 7/13/2024.      Impression    IMPRESSION: Stable size of cardiomediastinal silhouette with pacemaker  leads overlying the right atrium and right ventricle. No focal  airspace consolidation, pleural effusion or pneumothorax. No definite  acute bony abnormality. Old/healed left rib fractures are again noted.  Right shoulder arthroplasty partially visualized.    CAMERON OCAMPO MD         SYSTEM ID:  [id number]       Medications   aspirin (ASA) chewable tablet 243 mg (243 mg Oral $Given 10/1/24 1430)         Assessments & Plan (with Medical Decision Making)  Brannon adkins a  84 year old male who presented to the ED with his wife complaining of right ear and shoulder pain.  On arrival he also noted shortness of breath that started a couple hours prior to arrival.  Reports shoulder pain has been going on for a while.  Patient somewhat poor historian due to dementia.  On arrival he was hypertensive but otherwise had normal vital signs.  He had an unremarkable cardiopulmonary exam.  Right shoulder with diffuse tenderness but patient able to use arm to walk with cane without difficulty.  TMs were normal bilaterally.  I was concerned given his symptomology with shortness of breath for ACS so aspirin given here.  EKG today showed a paced rhythm.  Troponin was within normal limits at 16, ruling ACS less likely.  His white blood cell count was 19.2 with lymphocytes present, consistent with known history of lymphoma for which [facility].  Otherwise no significant abnormalities on workup today.  Chest x-ray did not show acute abnormalities.  [facility], he had a CT of his shoulder completed on 9/19.  This showed an old fracture of the right scapula, postoperative changes, overall no acute abnormalities.  I do think his right shoulder pain he complained of today is related to his chronic shoulder issues and could be exacerbated from walking with a cane with his right arm.  Discussed this possibility with him and suggested trying using his left arm to use the cane.  Ear pain could have been some referred pain from the shoulder.  No signs of otitis media today.  In regard to shortness of breath, on reassessment the patient denied ever having shortness of breath and was able to ambulate in the department without evidence of dyspnea.  I have low index of suspicion at this point for acute cardiac etiology for symptoms and there are no signs of pulmonary findings on imaging to suggest need for further workup or intervention.  Patient was  very eager to discharge home which I think is reasonable.  He was advised to [facility] team about [facility] visit and schedule follow-up if needed.  Can continue with Tylenol at home for pain relief of the right shoulder.  He was provided instructions to return to the ED for any new or worsening symptoms.  All questions answered and patient discharged home in stable condition.     I have reviewed the nursing notes.    I have reviewed the findings, diagnosis, plan and need for follow up with the patient.      Discharge Medication List as of 10/1/2024  3:31 PM          Final diagnoses:   Chronic right shoulder pain   Right ear pain   Shortness of breath     Note: Chart documentation done in part with Dragon Voice Recognition software. Although reviewed after completion, some word and grammatical errors may remain.     10/1/2024   [facility] EMERGENCY DEPT       Mansi Baeza PA-C  10/01/24 2316

## 2024-10-01 NOTE — DISCHARGE INSTRUCTIONS
Your workup today was overall reassuring.  No signs of heart stress based on blood work today.  I think your shoulder pain is chronic and is likely getting worse because of using your cane with the right hand.  You can take Tylenol as needed for pain control.  Please follow-up with your clinic provider regarding this visit.  If you have any new or worsening symptoms please return to the emergency department.    Thank you for choosing [facility]'s Emergency Department. It was a pleasure taking care of you today. If you have any questions, please call [phone].    Anisha Aiken, PAKaitC

## 2024-12-08 ENCOUNTER — HOSPITAL ENCOUNTER (EMERGENCY)
Facility: CLINIC | Age: 84
Discharge: HOME | End: 2024-12-08
Attending: EMERGENCY MEDICINE | Admitting: EMERGENCY MEDICINE
Payer: COMMERCIAL

## 2024-12-08 VITALS
TEMPERATURE: 96.5 F | BODY MASS INDEX: 32.88 KG/M2 | WEIGHT: 229.7 LBS | RESPIRATION RATE: 23 BRPM | HEIGHT: 70 IN | OXYGEN SATURATION: 95 % | HEART RATE: 64 BPM | SYSTOLIC BLOOD PRESSURE: 151 MMHG | DIASTOLIC BLOOD PRESSURE: 66 MMHG

## 2024-12-08 DIAGNOSIS — N39.0 URINARY TRACT INFECTION IN MALE: ICD-10-CM

## 2024-12-08 DIAGNOSIS — R31.0 GROSS HEMATURIA: ICD-10-CM

## 2024-12-08 LAB
ACANTHOCYTES BLD QL SMEAR: SLIGHT
ALBUMIN SERPL BCG-MCNC: 4.1 G/DL (ref 3.5–5.2)
ALBUMIN UR-MCNC: 30 MG/DL
ALP SERPL-CCNC: 158 U/L (ref 40–150)
ALT SERPL W P-5'-P-CCNC: 14 U/L (ref 0–70)
ANION GAP SERPL CALCULATED.3IONS-SCNC: 10 MMOL/L (ref 7–15)
APPEARANCE UR: ABNORMAL
AST SERPL W P-5'-P-CCNC: 16 U/L (ref 0–45)
ATRIAL RATE - MUSE: 65 BPM
BACTERIA #/AREA URNS HPF: ABNORMAL /HPF
BASOPHILS # BLD AUTO: 0.1 10E3/UL (ref 0–0.2)
BASOPHILS NFR BLD AUTO: 0 %
BILIRUB SERPL-MCNC: 0.4 MG/DL
BILIRUB UR QL STRIP: NEGATIVE
BUN SERPL-MCNC: 13.6 MG/DL (ref 8–23)
CALCIUM SERPL-MCNC: 9.2 MG/DL (ref 8.8–10.4)
CHLORIDE SERPL-SCNC: 102 MMOL/L (ref 98–107)
COLOR UR AUTO: ABNORMAL
CREAT SERPL-MCNC: 0.87 MG/DL (ref 0.67–1.17)
DIASTOLIC BLOOD PRESSURE - MUSE: NORMAL MMHG
EGFRCR SERPLBLD CKD-EPI 2021: 86 ML/MIN/1.73M2
EOSINOPHIL # BLD AUTO: 0.3 10E3/UL (ref 0–0.7)
EOSINOPHIL NFR BLD AUTO: 2 %
ERYTHROCYTE [DISTWIDTH] IN BLOOD BY AUTOMATED COUNT: 14.6 % (ref 10–15)
GLUCOSE SERPL-MCNC: 173 MG/DL (ref 70–99)
GLUCOSE UR STRIP-MCNC: 150 MG/DL
HCO3 SERPL-SCNC: 26 MMOL/L (ref 22–29)
HCT VFR BLD AUTO: 37.5 % (ref 40–53)
HGB BLD-MCNC: 12.5 G/DL (ref 13.3–17.7)
HGB UR QL STRIP: ABNORMAL
IMM GRANULOCYTES # BLD: 0 10E3/UL
IMM GRANULOCYTES NFR BLD: 0 %
INTERPRETATION ECG - MUSE: NORMAL
KETONES UR STRIP-MCNC: NEGATIVE MG/DL
LEUKOCYTE ESTERASE UR QL STRIP: ABNORMAL
LYMPHOCYTES # BLD AUTO: 8.6 10E3/UL (ref 0.8–5.3)
LYMPHOCYTES NFR BLD AUTO: 63 %
MCH RBC QN AUTO: 28.9 PG (ref 26.5–33)
MCHC RBC AUTO-ENTMCNC: 33.3 G/DL (ref 31.5–36.5)
MCV RBC AUTO: 87 FL (ref 78–100)
MONOCYTES # BLD AUTO: 0.8 10E3/UL (ref 0–1.3)
MONOCYTES NFR BLD AUTO: 6 %
NEUTROPHILS # BLD AUTO: 3.9 10E3/UL (ref 1.6–8.3)
NEUTROPHILS NFR BLD AUTO: 28 %
NITRATE UR QL: NEGATIVE
NRBC # BLD AUTO: 0 10E3/UL
NRBC BLD AUTO-RTO: 0 /100
P AXIS - MUSE: 33 DEGREES
PH UR STRIP: 6.5 [PH] (ref 5–7)
PLAT MORPH BLD: ABNORMAL
PLATELET # BLD AUTO: 194 10E3/UL (ref 150–450)
POTASSIUM SERPL-SCNC: 4.3 MMOL/L (ref 3.4–5.3)
PR INTERVAL - MUSE: 214 MS
PROT SERPL-MCNC: 7 G/DL (ref 6.4–8.3)
QRS DURATION - MUSE: 192 MS
QT - MUSE: 464 MS
QTC - MUSE: 482 MS
R AXIS - MUSE: -51 DEGREES
RBC # BLD AUTO: 4.33 10E6/UL (ref 4.4–5.9)
RBC MORPH BLD: ABNORMAL
RBC URINE: >182 /HPF
SODIUM SERPL-SCNC: 138 MMOL/L (ref 135–145)
SP GR UR STRIP: 1.01 (ref 1–1.03)
SYSTOLIC BLOOD PRESSURE - MUSE: NORMAL MMHG
T AXIS - MUSE: 117 DEGREES
TROPONIN T SERPL HS-MCNC: 18 NG/L
UROBILINOGEN UR STRIP-MCNC: NORMAL MG/DL
VARIANT LYMPHS BLD QL SMEAR: PRESENT
VENTRICULAR RATE- MUSE: 65 BPM
WBC # BLD AUTO: 13.6 10E3/UL (ref 4–11)
WBC URINE: 163 /HPF

## 2024-12-08 PROCEDURE — 250N000013 HC RX MED GY IP 250 OP 250 PS 637: Performed by: EMERGENCY MEDICINE

## 2024-12-08 PROCEDURE — 85004 AUTOMATED DIFF WBC COUNT: CPT | Performed by: EMERGENCY MEDICINE

## 2024-12-08 PROCEDURE — 36415 COLL VENOUS BLD VENIPUNCTURE: CPT | Performed by: EMERGENCY MEDICINE

## 2024-12-08 PROCEDURE — 99284 EMERGENCY DEPT VISIT MOD MDM: CPT | Performed by: EMERGENCY MEDICINE

## 2024-12-08 PROCEDURE — 87088 URINE BACTERIA CULTURE: CPT | Performed by: NURSE PRACTITIONER

## 2024-12-08 PROCEDURE — 93005 ELECTROCARDIOGRAM TRACING: CPT | Performed by: EMERGENCY MEDICINE

## 2024-12-08 PROCEDURE — 81001 URINALYSIS AUTO W/SCOPE: CPT | Performed by: NURSE PRACTITIONER

## 2024-12-08 PROCEDURE — 84484 ASSAY OF TROPONIN QUANT: CPT | Performed by: EMERGENCY MEDICINE

## 2024-12-08 PROCEDURE — 93010 ELECTROCARDIOGRAM REPORT: CPT | Performed by: EMERGENCY MEDICINE

## 2024-12-08 PROCEDURE — 87186 SC STD MICRODIL/AGAR DIL: CPT | Performed by: NURSE PRACTITIONER

## 2024-12-08 PROCEDURE — 80053 COMPREHEN METABOLIC PANEL: CPT | Performed by: EMERGENCY MEDICINE

## 2024-12-08 RX ORDER — CEFDINIR 300 MG/1
300 CAPSULE ORAL ONCE
Status: COMPLETED | OUTPATIENT
Start: 2024-12-08 | End: 2024-12-08

## 2024-12-08 RX ORDER — CEFDINIR 300 MG/1
300 CAPSULE ORAL 2 TIMES DAILY
Qty: 13 CAPSULE | Refills: 0 | Status: SHIPPED | OUTPATIENT
Start: 2024-12-08 | End: 2024-12-10

## 2024-12-08 RX ADMIN — CEFDINIR 300 MG: 300 CAPSULE ORAL at 16:54

## 2024-12-08 ASSESSMENT — ACTIVITIES OF DAILY LIVING (ADL)
ADLS_ACUITY_SCORE: 58
ADLS_ACUITY_SCORE: 58

## 2024-12-08 NOTE — DISCHARGE INSTRUCTIONS
Your urine does show infection so I am starting you on antibiotic.  I am giving the first dose here in the ED and you can continue taking the antibiotic tomorrow.  It was sent to [facility].    [facility] to let them know about your [facility] visit and the blood in the urine.  They may want to reevaluate the bladder cancer.    Return at anytime for worsening, changes or concerns.    I hope you have a great week!!

## 2024-12-08 NOTE — ED TRIAGE NOTES
PT here with reports of hematuria. PT states that it started a couple days ago, but today morning is worse. PT with history of bladder cancer as per the wife.     PT with additional reports of Mid-sternal Chest discomfort that started 30 minutes prior to ED visit, rates it as 7/10 pain score.

## 2024-12-08 NOTE — ED PROVIDER NOTES
"  History     Chief Complaint   Patient presents with    Hematuria    Chest Pain     HPI  History per patient, wife, medical records    This is an 84-year-old male, history of type 2 diabetes, atrial fibrillation, pacemaker, hypertension, hyperlipidemia, coronary artery disease status post stenting, CLL, CKD, GERD, dementia, presenting with hematuria.  Patient notes bright red blood when he urinated today.  This scared him.  In triage she also noted chest pain for about 30 minutes.  No fevers or chills.  He has had urinary frequency.  His wife states that he has history of bladder cancer \"but they are not going to do anything about it\".  He is not on blood thinners or aspirin.  He does not smoke.  He denies any current chest pain.  No abdominal or suprapubic pain.     [facility]  Please see below.    9/11/24  HGT1 urothelialcarcinoma: DX 04/2024,s/p TURBT w/ left ureteral stent   placement. Per urology; \"Guideline recommendations are for a repeat   transurethral resection of the bladder mass to ensure that the disease is not   more advanced. Unfortunately, given the patient's underlying medical   comorbidities, namely the atlantodental CalciFolic-D, understanding that places   ChondroCelect patient at this time which I agree with. Also, discussed the   possibility of initiating BCG therapy after his single bladder biopsy. At this   time, the family were in agreement with comfort measures him prefer not to   initiate additional therapy at this time. I have placed orders to have the   indwelling ureteral stent removed cystoscopically next available. We will then   continue with watchful waiting and intervene only if he begins having local   symptoms related to a disease recurrence\".  - Urology to continue to manage.  - Patient would prefer management of this be done at [facility] if at all   possible; via CVT or VVC or telephone visit. Please reach out to   patient to discuss the " possible modalities. Tagging Urology to   patient's request.     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  Added automatically from request for surgery [id number]      Leukocytosis 08/01/2018     Priority: Medium    Agent Orange poisoning 04/06/2011     Priority: Medium     Formatting of this note might be different from the original.  Pers. Hx of exposure to agent orange      Hypercholesteremia 04/06/2011     Priority: Medium    Essential hypertension 07/19/2005     Priority: Medium     Formatting of this note might be different from the original.  Borderline  IMO Update          Past Medical History:    No past medical history on file.    Past Surgical History:    Past Surgical History:   Procedure Laterality Date    ENDOSCOPIC RETROGRADE CHOLANGIOPANCREATOGRAM N/A 11/18/2023    Procedure: ENDOSCOPIC RETROGRADE CHOLANGIOPANCREATOGRAPHY, WITH STENT PLACEMENT;  Surgeon: Jose Morse MD;  Location:  OR       Family History:    No family history on file.    Social History:  Marital Status:   [2]  Social History     Tobacco Use    Smoking status: Former    Smokeless tobacco: Never   Vaping  "Use    Vaping status: Never Used   Substance Use Topics    Alcohol use: Not Currently    Drug use: Not Currently        Medications:    cefdinir (OMNICEF) 300 MG capsule  acetaminophen (TYLENOL) 325 MG tablet  aspirin (ASA) 81 MG EC tablet  atorvastatin (LIPITOR) 40 MG tablet  cyanocobalamin (VITAMIN B-12) 1000 MCG tablet  EnovaRX-Lidocaine HCl 5 % cream  famotidine (PEPCID) 20 MG tablet  gabapentin (NEURONTIN) 300 MG capsule  insulin glargine (LANTUS PEN) 100 UNIT/ML pen  levothyroxine (SYNTHROID/LEVOTHROID) 50 MCG tablet  Magnesium Oxide 420 MG TABS  metFORMIN (GLUCOPHAGE XR) 500 MG 24 hr tablet  nitroGLYcerin (NITROSTAT) 0.4 MG sublingual tablet  pantoprazole (PROTONIX) 40 MG EC tablet  Psyllium (NATURAL FIBER THERAPY) 48.57 % POWD  senna (SENOKOT) 8.6 MG tablet  vitamin C (ASCORBIC ACID) 500 MG tablet  Vitamin D3 (CHOLECALCIFEROL) 25 mcg (1000 units) tablet  zinc sulfate (ZINCATE) 220 (50 Zn) MG capsule          Review of Systems  All other ROS reviewed and are negative or non-contributory except as stated in HPI.     Physical Exam   BP: (!) 192/87  Pulse: 71  Temp: (!) 96.5  F (35.8  C)  Resp: 23  Height: 177.8 cm (5' 10\")  Weight: 104.2 kg (229 lb 11.2 oz)  SpO2: 98 %      Physical Exam  Vitals and nursing note reviewed.   Constitutional:       Appearance: He is well-developed.      Comments: Patient is sitting on the bed.  [facility]  Initially a little surly but then more pleasant as relaxed.   HENT:      Head: Normocephalic.      Nose: Nose normal.      Mouth/Throat:      Mouth: Mucous membranes are moist.      Pharynx: Oropharynx is clear.   Eyes:      Extraocular Movements: Extraocular movements intact.      Comments: Mild conjunctival pallor   Cardiovascular:      Rate and Rhythm: Normal rate and regular rhythm.      Pulses: Normal pulses.      Heart sounds: Normal heart sounds. No murmur heard.     Comments: Pacemaker  Pulmonary:      Effort: Pulmonary effort is normal.      " Breath sounds: Normal breath sounds.   Abdominal:      Palpations: Abdomen is soft.      Tenderness: There is no abdominal tenderness.   Musculoskeletal:         General: Normal range of motion.      Cervical back: Normal range of motion and neck supple.   Skin:     General: Skin is warm and dry.      Coloration: Skin is not pale.      Findings: No rash.   Neurological:      General: No focal deficit present.      Mental Status: He is alert.         ED Course (with Medical Decision Making)    Pt seen and examined by me.  RN and EPIC notes reviewed.       Patient with concerns of hematuria.  History of bladder cancer.  Possible UTI versus cancer related bleeding.  He also had apparently 30 minutes of chest pain, nothing currently.    EKG was done.  This shows pacemaker with a rate of 65.  Read by myself at 1408.    Patient has white blood cell count of 13.6.  On review of all of his previous white blood cell counts they are always a little elevated and he has CLL and this is actually lower than previous.  Hemoglobin 12.5 which is stable.  Normal platelets.  Normal troponin  CMP normal except for mildly elevated glucose at 173.  Alk phosphatase 158.  Other LFTs normal.    Patient still did not have any evidence for chest pain while in the ED and being monitored.  He did finally make some urine.  It is grossly bloody.  There is leukocyte esterase positive, bacteria, large amount of white cells, nitrite negative.  I am going to treat him with antibiotics.  First dose of Omnicef given here in the ED.  Rx sent to pharmacy.  I talked with his wife and recommend [facility].  If he has any worsening, changes or concerns return at any time.        Procedures    Results for orders placed or performed during the hospital encounter of 12/08/24 (from the past 24 hours)   EKG 12 lead   Result Value Ref Range    Systolic Blood Pressure  mmHg    Diastolic Blood Pressure  mmHg    Ventricular Rate 65  BPM    Atrial Rate 65 BPM    NV Interval 214 ms    QRS Duration 192 ms     ms    QTc 482 ms    P Axis 33 degrees    R AXIS -51 degrees    T Axis 117 degrees    Interpretation ECG       Atrial-sensed ventricular-paced rhythm with prolonged AV conduction  Abnormal ECG  When compared with ECG of 18-Nov-2023 05:37,  Electronic ventricular pacemaker has replaced Electronic atrial pacemaker  Confirmed by SEE ED PROVIDER NOTE FOR, ECG INTERPRETATION ([id number]),  Tanya Humphreys ([id number]) on 12/8/2024 2:19:23 PM     CBC with platelets differential    Narrative    The following orders were created for panel order CBC with platelets differential.  Procedure                               Abnormality         Status                     ---------                               -----------         ------                     CBC with platelets and d...[[id number]]  Abnormal            Final result               RBC and Platelet Morphology[[id number]]  Abnormal            Final result                 Please view results for these tests on the individual orders.   Comprehensive metabolic panel   Result Value Ref Range    Sodium 138 135 - 145 mmol/L    Potassium 4.3 3.4 - 5.3 mmol/L    Carbon Dioxide (CO2) 26 22 - 29 mmol/L    Anion Gap 10 7 - 15 mmol/L    Urea Nitrogen 13.6 8.0 - 23.0 mg/dL    Creatinine 0.87 0.67 - 1.17 mg/dL    GFR Estimate 86 >60 mL/min/1.73m2    Calcium 9.2 8.8 - 10.4 mg/dL    Chloride 102 98 - 107 mmol/L    Glucose 173 (H) 70 - 99 mg/dL    Alkaline Phosphatase 158 (H) 40 - 150 U/L    AST 16 0 - 45 U/L    ALT 14 0 - 70 U/L    Protein Total 7.0 6.4 - 8.3 g/dL    Albumin 4.1 3.5 - 5.2 g/dL    Bilirubin Total 0.4 <=1.2 mg/dL   Troponin T, High Sensitivity   Result Value Ref Range    Troponin T, High Sensitivity 18 <=22 ng/L   CBC with platelets and differential   Result Value Ref Range    WBC Count 13.6 (H) 4.0 - 11.0 10e3/uL    RBC Count 4.33 (L) 4.40 - 5.90 10e6/uL    Hemoglobin 12.5 (L) 13.3 - 17.7 g/dL     Hematocrit 37.5 (L) 40.0 - 53.0 %    MCV 87 78 - 100 fL    MCH 28.9 26.5 - 33.0 pg    MCHC 33.3 31.5 - 36.5 g/dL    RDW 14.6 10.0 - 15.0 %    Platelet Count 194 150 - 450 10e3/uL    % Neutrophils 28 %    % Lymphocytes 63 %    % Monocytes 6 %    % Eosinophils 2 %    % Basophils 0 %    % Immature Granulocytes 0 %    NRBCs per 100 WBC 0 <1 /100    Absolute Neutrophils 3.9 1.6 - 8.3 10e3/uL    Absolute Lymphocytes 8.6 (H) 0.8 - 5.3 10e3/uL    Absolute Monocytes 0.8 0.0 - 1.3 10e3/uL    Absolute Eosinophils 0.3 0.0 - 0.7 10e3/uL    Absolute Basophils 0.1 0.0 - 0.2 10e3/uL    Absolute Immature Granulocytes 0.0 <=0.4 10e3/uL    Absolute NRBCs 0.0 10e3/uL   RBC and Platelet Morphology   Result Value Ref Range    RBC Morphology Confirmed RBC Indices     Platelet Assessment  Automated Count Confirmed. Platelet morphology is normal.     Automated Count Confirmed. Platelet morphology is normal.    Acanthocytes Slight (A) None Seen    Reactive Lymphocytes Present (A) None Seen   UA with Microscopic reflex to Culture    Specimen: Urine, Midstream   Result Value Ref Range    Color Urine Brown (A) Colorless, Straw, Light Yellow, Yellow    Appearance Urine Slightly Cloudy (A) Clear    Glucose Urine 150 (A) Negative mg/dL    Bilirubin Urine Negative Negative    Ketones Urine Negative Negative mg/dL    Specific Gravity Urine 1.014 1.003 - 1.035    Blood Urine Large (A) Negative    pH Urine 6.5 5.0 - 7.0    Protein Albumin Urine 30 (A) Negative mg/dL    Urobilinogen Urine Normal Normal, 2.0 mg/dL    Nitrite Urine Negative Negative    Leukocyte Esterase Urine Moderate (A) Negative    Bacteria Urine Few (A) None Seen /HPF    RBC Urine >182 (H) <=2 /HPF    WBC Urine 163 (H) <=5 /HPF    Narrative    Urine Culture ordered based on laboratory criteria       Medications   cefdinir (OMNICEF) capsule 300 mg (300 mg Oral $Given 12/8/24 4611)       Assessments & Plan      I have reviewed the findings, diagnosis, plan and need for follow up with  the patient.    Discharge Medication List as of 12/8/2024  4:56 PM        START taking these medications    Details   cefdinir (OMNICEF) 300 MG capsule Take 1 capsule (300 mg) by mouth 2 times daily for 7 days., Disp-13 capsule, R-0, E-Prescribe             Final diagnoses:   Urinary tract infection in male   Gross hematuria     Disposition: Patient discharged home in stable condition.  Plan as above.  Return for concerns.     Note: Chart documentation done in part with Dragon Voice Recognition software. Although reviewed after completion, some word and grammatical errors may remain.     12/8/2024   [facility] EMERGENCY DEPT       Mae Calderón MD  12/09/24 0514

## 2024-12-10 ENCOUNTER — TELEPHONE (OUTPATIENT)
Dept: NURSING | Facility: CLINIC | Age: 84
End: 2024-12-10
Payer: COMMERCIAL

## 2024-12-10 DIAGNOSIS — N39.0 URINARY TRACT INFECTION: Primary | ICD-10-CM

## 2024-12-10 LAB — BACTERIA UR CULT: ABNORMAL

## 2024-12-10 NOTE — TELEPHONE ENCOUNTER
[facility]    Reason for call: Lab Result Notification     Lab Result (including Rx patient on, if applicable).  If culture, copy of lab report at bottom.  Lab Result: Urine Culture - See Below    ED Rx: cefdinir (OMNICEF) 300 MG capsule - Take 1 capsule (300 mg) by mouth 2 times daily for 7 days (NOT TESTED)    Creatinine Level (mg/dl)   Creatinine   Date Value Ref Range Status   12/08/2024 0.87 0.67 - 1.17 mg/dL Final   06/01/2021 0.97 0.66 - 1.25 mg/dL Final    Creatinine clearance (ml/min), if applicable    Serum creatinine: 0.87 mg/dL 12/08/24 1445  Estimated creatinine clearance: 77.8 mL/min     ED Symptoms: Presented to the ED with blood in his urine.      Current Symptoms: Unable to assess.     12/10/2024 @ 1:25PM: Patient's wife calling back. Lebron gave permission for me to speak with his wife.  Patient states he is feeling better. Denies any new or worsening symptoms.     Allergic to ATBs: No  Breastfeeding: N/A  Pregnant: N/A  On Coumadin/Warfarin: No  Had any urinary procedures or have urinary   retention, neurogenic bladder, or use a catheter: No    RN Recommendations/Instructions per [place] ED lab result protocol:   [facility] ED lab result protocol utilized: Urine Culture  Advise to discontinue current antibiotic.   Instruct to start antibiotic: Would recommend Augmentin pending patient assessment.     Unable to reach patient/caregiver.     Left voicemail message requesting a call back to [phone] between 9 a.m. and 5:30 p.m. for patient's ED/ lab results.    Letter pended to be sent via [employer] mail.        SCARLET OSWALD RN

## 2024-12-10 NOTE — LETTER
December 10, 2024        Brannon Boggs  [address] [facility] [address]          Dear Brannon Boggs:    You were seen in the [facility] Emergency Department at [facility] on 12/8/2024.  We are unable to reach you by phone, so we are sending you this letter.     It is important that you call [facility] Emergency Department lab result nurse at [phone], as we have information to relay to you AND/OR we MAY have to make some changes in your treatment.    Best time to call back is between 9AM and 5:30PM, 7 days a week.      Sincerely,     [facility] Emergency Department Lab Result RN  [phone]   PA started for fluticasone-salmeterol (ADVAIR) 500-50 MCG/ACT inhaler .  Log into [email or web address] and enter info from below:    Key: [id number]  Patient last name: LINDA  : 1950

## 2024-12-13 ENCOUNTER — HOSPITAL ENCOUNTER (INPATIENT)
Facility: CLINIC | Age: 84
LOS: 2 days | Discharge: HOME | DRG: 690 | End: 2024-12-15
Attending: STUDENT IN AN ORGANIZED HEALTH CARE EDUCATION/TRAINING PROGRAM | Admitting: INTERNAL MEDICINE
Payer: COMMERCIAL

## 2024-12-13 ENCOUNTER — APPOINTMENT (OUTPATIENT)
Dept: GENERAL RADIOLOGY | Facility: CLINIC | Age: 84
DRG: 690 | End: 2024-12-13
Attending: STUDENT IN AN ORGANIZED HEALTH CARE EDUCATION/TRAINING PROGRAM
Payer: COMMERCIAL

## 2024-12-13 DIAGNOSIS — N39.0 URINARY TRACT INFECTION WITH HEMATURIA, SITE UNSPECIFIED: ICD-10-CM

## 2024-12-13 DIAGNOSIS — R31.9 URINARY TRACT INFECTION WITH HEMATURIA, SITE UNSPECIFIED: ICD-10-CM

## 2024-12-13 DIAGNOSIS — E87.20 LACTIC ACIDOSIS: ICD-10-CM

## 2024-12-13 DIAGNOSIS — D72.829 LEUKOCYTOSIS, UNSPECIFIED TYPE: ICD-10-CM

## 2024-12-13 PROBLEM — N30.01 ACUTE CYSTITIS WITH HEMATURIA: Status: ACTIVE | Noted: 2024-12-13

## 2024-12-13 LAB
ALBUMIN SERPL BCG-MCNC: 4.2 G/DL (ref 3.5–5.2)
ALBUMIN UR-MCNC: 30 MG/DL
ALP SERPL-CCNC: 155 U/L (ref 40–150)
ALT SERPL W P-5'-P-CCNC: 16 U/L (ref 0–70)
ANION GAP SERPL CALCULATED.3IONS-SCNC: 13 MMOL/L (ref 7–15)
APPEARANCE UR: ABNORMAL
AST SERPL W P-5'-P-CCNC: 23 U/L (ref 0–45)
ATRIAL RATE - MUSE: 71 BPM
BASOPHILS # BLD AUTO: 0.1 10E3/UL (ref 0–0.2)
BASOPHILS NFR BLD AUTO: 0 %
BILIRUB SERPL-MCNC: 0.4 MG/DL
BILIRUB UR QL STRIP: NEGATIVE
BUN SERPL-MCNC: 12.1 MG/DL (ref 8–23)
CALCIUM SERPL-MCNC: 9 MG/DL (ref 8.8–10.4)
CHLORIDE SERPL-SCNC: 99 MMOL/L (ref 98–107)
COLOR UR AUTO: ABNORMAL
CREAT SERPL-MCNC: 0.84 MG/DL (ref 0.67–1.17)
DIASTOLIC BLOOD PRESSURE - MUSE: NORMAL MMHG
EGFRCR SERPLBLD CKD-EPI 2021: 87 ML/MIN/1.73M2
EOSINOPHIL # BLD AUTO: 0.3 10E3/UL (ref 0–0.7)
EOSINOPHIL NFR BLD AUTO: 2 %
ERYTHROCYTE [DISTWIDTH] IN BLOOD BY AUTOMATED COUNT: 14.4 % (ref 10–15)
FLUAV RNA SPEC QL NAA+PROBE: NEGATIVE
FLUBV RNA RESP QL NAA+PROBE: NEGATIVE
GLUCOSE BLDC GLUCOMTR-MCNC: 226 MG/DL (ref 70–99)
GLUCOSE SERPL-MCNC: 187 MG/DL (ref 70–99)
GLUCOSE UR STRIP-MCNC: 300 MG/DL
HCO3 SERPL-SCNC: 22 MMOL/L (ref 22–29)
HCT VFR BLD AUTO: 38.2 % (ref 40–53)
HGB BLD-MCNC: 12.5 G/DL (ref 13.3–17.7)
HGB UR QL STRIP: ABNORMAL
IMM GRANULOCYTES # BLD: 0 10E3/UL
IMM GRANULOCYTES NFR BLD: 0 %
INTERPRETATION ECG - MUSE: NORMAL
KETONES UR STRIP-MCNC: NEGATIVE MG/DL
LACTATE SERPL-SCNC: 2.5 MMOL/L (ref 0.7–2)
LACTATE SERPL-SCNC: 2.6 MMOL/L (ref 0.7–2)
LEUKOCYTE ESTERASE UR QL STRIP: ABNORMAL
LIPASE SERPL-CCNC: 20 U/L (ref 13–60)
LYMPHOCYTES # BLD AUTO: 8.7 10E3/UL (ref 0.8–5.3)
LYMPHOCYTES NFR BLD AUTO: 59 %
MAGNESIUM SERPL-MCNC: 1.7 MG/DL (ref 1.7–2.3)
MCH RBC QN AUTO: 28.5 PG (ref 26.5–33)
MCHC RBC AUTO-ENTMCNC: 32.7 G/DL (ref 31.5–36.5)
MCV RBC AUTO: 87 FL (ref 78–100)
MONOCYTES # BLD AUTO: 0.8 10E3/UL (ref 0–1.3)
MONOCYTES NFR BLD AUTO: 5 %
MUCOUS THREADS #/AREA URNS LPF: PRESENT /LPF
NEUTROPHILS # BLD AUTO: 4.7 10E3/UL (ref 1.6–8.3)
NEUTROPHILS NFR BLD AUTO: 32 %
NITRATE UR QL: NEGATIVE
NRBC # BLD AUTO: 0 10E3/UL
NRBC BLD AUTO-RTO: 0 /100
P AXIS - MUSE: 46 DEGREES
PH UR STRIP: 6 [PH] (ref 5–7)
PLAT MORPH BLD: ABNORMAL
PLATELET # BLD AUTO: 199 10E3/UL (ref 150–450)
POTASSIUM SERPL-SCNC: 5 MMOL/L (ref 3.4–5.3)
PR INTERVAL - MUSE: 208 MS
PROCALCITONIN SERPL IA-MCNC: 0.04 NG/ML
PROT SERPL-MCNC: 7 G/DL (ref 6.4–8.3)
QRS DURATION - MUSE: 188 MS
QT - MUSE: 454 MS
QTC - MUSE: 493 MS
R AXIS - MUSE: -54 DEGREES
RBC # BLD AUTO: 4.38 10E6/UL (ref 4.4–5.9)
RBC MORPH BLD: ABNORMAL
RBC URINE: >182 /HPF
RSV RNA SPEC NAA+PROBE: NEGATIVE
SARS-COV-2 RNA RESP QL NAA+PROBE: NEGATIVE
SODIUM SERPL-SCNC: 134 MMOL/L (ref 135–145)
SP GR UR STRIP: 1.01 (ref 1–1.03)
SYSTOLIC BLOOD PRESSURE - MUSE: NORMAL MMHG
T AXIS - MUSE: 112 DEGREES
TROPONIN T SERPL HS-MCNC: 15 NG/L
TROPONIN T SERPL HS-MCNC: 17 NG/L
TSH SERPL DL<=0.005 MIU/L-ACNC: 2.98 UIU/ML (ref 0.3–4.2)
UROBILINOGEN UR STRIP-MCNC: NORMAL MG/DL
VARIANT LYMPHS BLD QL SMEAR: PRESENT
VENTRICULAR RATE- MUSE: 71 BPM
WBC # BLD AUTO: 14.6 10E3/UL (ref 4–11)
WBC URINE: 3 /HPF

## 2024-12-13 PROCEDURE — 85004 AUTOMATED DIFF WBC COUNT: CPT | Performed by: STUDENT IN AN ORGANIZED HEALTH CARE EDUCATION/TRAINING PROGRAM

## 2024-12-13 PROCEDURE — 250N000011 HC RX IP 250 OP 636: Performed by: STUDENT IN AN ORGANIZED HEALTH CARE EDUCATION/TRAINING PROGRAM

## 2024-12-13 PROCEDURE — G0378 HOSPITAL OBSERVATION PER HR: HCPCS

## 2024-12-13 PROCEDURE — 258N000003 HC RX IP 258 OP 636: Performed by: STUDENT IN AN ORGANIZED HEALTH CARE EDUCATION/TRAINING PROGRAM

## 2024-12-13 PROCEDURE — 83735 ASSAY OF MAGNESIUM: CPT | Performed by: STUDENT IN AN ORGANIZED HEALTH CARE EDUCATION/TRAINING PROGRAM

## 2024-12-13 PROCEDURE — 82040 ASSAY OF SERUM ALBUMIN: CPT | Performed by: STUDENT IN AN ORGANIZED HEALTH CARE EDUCATION/TRAINING PROGRAM

## 2024-12-13 PROCEDURE — 99285 EMERGENCY DEPT VISIT HI MDM: CPT | Mod: 25 | Performed by: STUDENT IN AN ORGANIZED HEALTH CARE EDUCATION/TRAINING PROGRAM

## 2024-12-13 PROCEDURE — 99285 EMERGENCY DEPT VISIT HI MDM: CPT | Mod: 25

## 2024-12-13 PROCEDURE — 87637 SARSCOV2&INF A&B&RSV AMP PRB: CPT | Performed by: STUDENT IN AN ORGANIZED HEALTH CARE EDUCATION/TRAINING PROGRAM

## 2024-12-13 PROCEDURE — 83605 ASSAY OF LACTIC ACID: CPT | Performed by: STUDENT IN AN ORGANIZED HEALTH CARE EDUCATION/TRAINING PROGRAM

## 2024-12-13 PROCEDURE — 36415 COLL VENOUS BLD VENIPUNCTURE: CPT | Performed by: STUDENT IN AN ORGANIZED HEALTH CARE EDUCATION/TRAINING PROGRAM

## 2024-12-13 PROCEDURE — 120N000001 HC R&B MED SURG/OB

## 2024-12-13 PROCEDURE — 99207 PR NOT IN PERSON INPATIENT CONSULT STATISTICAL MARKER: CPT | Performed by: INTERNAL MEDICINE

## 2024-12-13 PROCEDURE — 84484 ASSAY OF TROPONIN QUANT: CPT | Performed by: STUDENT IN AN ORGANIZED HEALTH CARE EDUCATION/TRAINING PROGRAM

## 2024-12-13 PROCEDURE — 250N000012 HC RX MED GY IP 250 OP 636 PS 637: Performed by: INTERNAL MEDICINE

## 2024-12-13 PROCEDURE — 87040 BLOOD CULTURE FOR BACTERIA: CPT | Performed by: STUDENT IN AN ORGANIZED HEALTH CARE EDUCATION/TRAINING PROGRAM

## 2024-12-13 PROCEDURE — 96365 THER/PROPH/DIAG IV INF INIT: CPT

## 2024-12-13 PROCEDURE — 81001 URINALYSIS AUTO W/SCOPE: CPT | Performed by: STUDENT IN AN ORGANIZED HEALTH CARE EDUCATION/TRAINING PROGRAM

## 2024-12-13 PROCEDURE — 81003 URINALYSIS AUTO W/O SCOPE: CPT | Performed by: STUDENT IN AN ORGANIZED HEALTH CARE EDUCATION/TRAINING PROGRAM

## 2024-12-13 PROCEDURE — 93005 ELECTROCARDIOGRAM TRACING: CPT

## 2024-12-13 PROCEDURE — 84145 PROCALCITONIN (PCT): CPT | Performed by: STUDENT IN AN ORGANIZED HEALTH CARE EDUCATION/TRAINING PROGRAM

## 2024-12-13 PROCEDURE — 84443 ASSAY THYROID STIM HORMONE: CPT | Performed by: STUDENT IN AN ORGANIZED HEALTH CARE EDUCATION/TRAINING PROGRAM

## 2024-12-13 PROCEDURE — 93010 ELECTROCARDIOGRAM REPORT: CPT | Performed by: STUDENT IN AN ORGANIZED HEALTH CARE EDUCATION/TRAINING PROGRAM

## 2024-12-13 PROCEDURE — 96361 HYDRATE IV INFUSION ADD-ON: CPT

## 2024-12-13 PROCEDURE — 83690 ASSAY OF LIPASE: CPT | Performed by: STUDENT IN AN ORGANIZED HEALTH CARE EDUCATION/TRAINING PROGRAM

## 2024-12-13 PROCEDURE — 71045 X-RAY EXAM CHEST 1 VIEW: CPT

## 2024-12-13 PROCEDURE — 99222 1ST HOSP IP/OBS MODERATE 55: CPT | Performed by: INTERNAL MEDICINE

## 2024-12-13 RX ORDER — DEXTROSE MONOHYDRATE 25 G/50ML
25-50 INJECTION, SOLUTION INTRAVENOUS
Status: DISCONTINUED | OUTPATIENT
Start: 2024-12-13 | End: 2024-12-15 | Stop reason: HOSPADM

## 2024-12-13 RX ORDER — CALCIUM CARBONATE 500 MG/1
1000 TABLET, CHEWABLE ORAL 4 TIMES DAILY PRN
Status: DISCONTINUED | OUTPATIENT
Start: 2024-12-13 | End: 2024-12-15 | Stop reason: HOSPADM

## 2024-12-13 RX ORDER — CEFTRIAXONE 1 G/1
1 INJECTION, POWDER, FOR SOLUTION INTRAMUSCULAR; INTRAVENOUS EVERY 24 HOURS
Status: DISCONTINUED | OUTPATIENT
Start: 2024-12-14 | End: 2024-12-14

## 2024-12-13 RX ORDER — CEFTRIAXONE 1 G/1
1 INJECTION, POWDER, FOR SOLUTION INTRAMUSCULAR; INTRAVENOUS ONCE
Status: COMPLETED | OUTPATIENT
Start: 2024-12-13 | End: 2024-12-13

## 2024-12-13 RX ORDER — LIDOCAINE 40 MG/G
CREAM TOPICAL
Status: DISCONTINUED | OUTPATIENT
Start: 2024-12-13 | End: 2024-12-15 | Stop reason: HOSPADM

## 2024-12-13 RX ORDER — ACETAMINOPHEN 650 MG/1
650 SUPPOSITORY RECTAL EVERY 4 HOURS PRN
Status: DISCONTINUED | OUTPATIENT
Start: 2024-12-13 | End: 2024-12-15 | Stop reason: HOSPADM

## 2024-12-13 RX ORDER — NICOTINE POLACRILEX 4 MG
15-30 LOZENGE BUCCAL
Status: DISCONTINUED | OUTPATIENT
Start: 2024-12-13 | End: 2024-12-15 | Stop reason: HOSPADM

## 2024-12-13 RX ORDER — FLUTICASONE PROPIONATE 50 MCG
2 SPRAY, SUSPENSION (ML) NASAL DAILY PRN
COMMUNITY
Start: 2024-11-12

## 2024-12-13 RX ORDER — AMOXICILLIN 250 MG
2 CAPSULE ORAL 2 TIMES DAILY PRN
Status: DISCONTINUED | OUTPATIENT
Start: 2024-12-13 | End: 2024-12-15 | Stop reason: HOSPADM

## 2024-12-13 RX ORDER — AMOXICILLIN 250 MG
1 CAPSULE ORAL 2 TIMES DAILY PRN
Status: DISCONTINUED | OUTPATIENT
Start: 2024-12-13 | End: 2024-12-15 | Stop reason: HOSPADM

## 2024-12-13 RX ORDER — ACETAMINOPHEN 325 MG/1
650 TABLET ORAL EVERY 4 HOURS PRN
Status: DISCONTINUED | OUTPATIENT
Start: 2024-12-13 | End: 2024-12-15 | Stop reason: HOSPADM

## 2024-12-13 RX ADMIN — SODIUM CHLORIDE 500 ML: 9 INJECTION, SOLUTION INTRAVENOUS at 19:53

## 2024-12-13 RX ADMIN — INSULIN ASPART 1 UNITS: 100 INJECTION, SOLUTION INTRAVENOUS; SUBCUTANEOUS at 23:13

## 2024-12-13 RX ADMIN — SODIUM CHLORIDE 500 ML: 9 INJECTION, SOLUTION INTRAVENOUS at 18:36

## 2024-12-13 RX ADMIN — CEFTRIAXONE SODIUM 1 G: 1 INJECTION, POWDER, FOR SOLUTION INTRAMUSCULAR; INTRAVENOUS at 19:19

## 2024-12-13 ASSESSMENT — ACTIVITIES OF DAILY LIVING (ADL)
ADLS_ACUITY_SCORE: 58
ADLS_ACUITY_SCORE: 58
ADLS_ACUITY_SCORE: 39
ADLS_ACUITY_SCORE: 58

## 2024-12-13 NOTE — ED PROVIDER NOTES
History     Chief Complaint   Patient presents with    Shortness of Breath     HPI  Brannon Boggs is a 84 year old male who presents the emergency department with his wife for concerns of feeling rundown and fatigued.  They are concerned that he still has a urinary infection, recently finished a course of antibiotics for UTI.  He continues to have gross hematuria, has known bladder cancer but mentions that it does not usually bleed into the urine.  He does also mention a dry cough.  No particular pains anywhere.    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  Added automatically from request for surgery [id number]      Leukocytosis 08/01/2018     Priority: Medium    Agent Orange poisoning 04/06/2011     Priority: Medium     Formatting of this note might be different from the original.  Pers. Hx of exposure to agent orange      Hypercholesteremia 04/06/2011     Priority: Medium    Essential hypertension 07/19/2005     Priority: Medium     Formatting of this note might be different from the original.  Borderline  IMO Update          Past Medical History:    History reviewed. No pertinent past medical  history.    Past Surgical History:    Past Surgical History:   Procedure Laterality Date    ENDOSCOPIC RETROGRADE CHOLANGIOPANCREATOGRAM N/A 11/18/2023    Procedure: ENDOSCOPIC RETROGRADE CHOLANGIOPANCREATOGRAPHY, WITH STENT PLACEMENT;  Surgeon: Jose Morse MD;  Location:  OR       Family History:    No family history on file.    Social History:  Marital Status:   [2]  Social History     Tobacco Use    Smoking status: Former    Smokeless tobacco: Never   Vaping Use    Vaping status: Never Used   Substance Use Topics    Alcohol use: Not Currently    Drug use: Not Currently        Medications:    acetaminophen (TYLENOL) 325 MG tablet  amoxicillin-clavulanate (AUGMENTIN) 875-125 MG tablet  aspirin (ASA) 81 MG EC tablet  atorvastatin (LIPITOR) 40 MG tablet  cyanocobalamin (VITAMIN B-12) 1000 MCG tablet  EnovaRX-Lidocaine HCl 5 % cream  famotidine (PEPCID) 20 MG tablet  gabapentin (NEURONTIN) 300 MG capsule  insulin glargine (LANTUS PEN) 100 UNIT/ML pen  levothyroxine (SYNTHROID/LEVOTHROID) 50 MCG tablet  Magnesium Oxide 420 MG TABS  metFORMIN (GLUCOPHAGE XR) 500 MG 24 hr tablet  nitroGLYcerin (NITROSTAT) 0.4 MG sublingual tablet  pantoprazole (PROTONIX) 40 MG EC tablet  Psyllium (NATURAL FIBER THERAPY) 48.57 % POWD  senna (SENOKOT) 8.6 MG tablet  vitamin C (ASCORBIC ACID) 500 MG tablet  Vitamin D3 (CHOLECALCIFEROL) 25 mcg (1000 units) tablet  zinc sulfate (ZINCATE) 220 (50 Zn) MG capsule          Review of Systems  Gen: No fevers, no unintentional weight changes  Head and neck: No headache, no neck pain  Eye: No eye pain, no vision changes  Respiratory: No shortness of breath, no cough  Cardiovascular: No chest pain, no palpitations  Abdominal: No vomiting, no constipation, no diarrhea  Urinary: No dysuria,   Musculoskeletal: No joint redness, no trauma  Neurologic: No confusion, no weakness, no sensation changes  Psychiatric: No suicidal ideation, no homicidal ideation    Physical Exam   BP: (!)  165/74  Pulse: 77  Temp: 97.8  F (36.6  C)  Resp: 18  SpO2: 97 %      Physical Exam  General: Alert, awake, no distress  Head: Normocephalic, atraumatic  Eyes: Grossly intact extraocular movements, conjunctiva clear, pupils equal   Mouth: Mucous membranes moist  Neck: Supple, grossly full range of motion, no observable masses  Respiratory: No distress,  clear to auscultation bilaterally  Cardiac: S1 and S2 cardiac sounds appreciated, normal rate, no edema  Abdominal: Soft, not distended, no tenderness appreciated  Neurologic: Normal level of consciousness, speech is clear and appropriate, moving all extremities grossly normal and symmetric  Skin: No gross rashes or lesions  Psych: Normal mood and appropriate for situation        ED Course        Procedures                Results for orders placed or performed during the hospital encounter of 12/13/24 (from the past 24 hours)   EKG 12-lead, tracing only   Result Value Ref Range    Systolic Blood Pressure  mmHg    Diastolic Blood Pressure  mmHg    Ventricular Rate 71 BPM    Atrial Rate 71 BPM    ID Interval 208 ms    QRS Duration 188 ms     ms    QTc 493 ms    P Axis 46 degrees    R AXIS -54 degrees    T Axis 112 degrees    Interpretation ECG       Atrial-sensed ventricular-paced rhythm  Abnormal ECG  When compared with ECG of 08-Dec-2024 14:05,  Vent. rate has increased by   6 bpm         Medications - No data to display    Assessments & Plan (with Medical Decision Making)   Blood pressure 164/74, rest of vitals are reassuring.  States he is comfortable and does not require any medications during workup today.  Reviewed his previous EMR.  EKG interpreted by myself shows a paced rhythm with a rate of 71, unchanged from December 8, 2024.    I have reviewed the nursing notes.    I have reviewed the findings, diagnosis, plan and need for follow up with the patient.        New Prescriptions    No medications on file       Final diagnoses:   None       12/13/2024   M  [facility] EMERGENCY DEPT       Sami Valdes MD  12/13/24 6657

## 2024-12-13 NOTE — ED TRIAGE NOTES
"Pt reports he has been experiencing shortness of breath, generalized weakness, and overall feeling \"lousy\" and when asked what symptoms he is experiencing he states \"everything\" denying any pain at this time. Wife reports symptoms over a week. Diagnosed with a bladder infection a little over a week ago as well.      Triage Assessment (Adult)       Row Name 12/13/24 7158          Triage Assessment    Airway WDL WDL        Respiratory WDL    Respiratory WDL X  shortness of breath        Skin Circulation/Temperature WDL    Skin Circulation/Temperature WDL WDL        Cardiac WDL    Cardiac WDL WDL        Peripheral/Neurovascular WDL    Peripheral Neurovascular WDL WDL        Cognitive/Neuro/Behavioral WDL    Cognitive/Neuro/Behavioral WDL WDL                     "

## 2024-12-13 NOTE — MEDICATION SCRIBE - ADMISSION MEDICATION HISTORY
Medication Scribe Admission Medication History    Admission medication history is complete. The information provided in this note is only as accurate as the sources available at the time of the update.    Information Source(s): Patient, Family member, and CareEverywhere/SureScripts via in-person    Pertinent Information: spouse Rossana present and helpful. Verified no use of pain pump, inhalers or prescription eye drops currently.      Changes made to PTA medication list:  Added: None  Deleted: None  Changed: famotidine from 40mg BID to 20mg BID, metformin is morning and bedtime    Allergies reviewed with patient and updates made in EHR: yes    Medication History Completed By: OLINDA LACY 12/13/2024 5:02 PM    PTA Med List   Medication Sig Last Dose/Taking    acetaminophen (TYLENOL) 325 MG tablet Take 650 mg by mouth daily as needed for fever or pain 12/13/2024 at  1:00 PM    aspirin (ASA) 81 MG EC tablet Take 81 mg by mouth daily 12/13/2024 at  8:30 AM    atorvastatin (LIPITOR) 40 MG tablet Take 1 tablet (40 mg) by mouth at bedtime 12/12/2024 at  8:00 PM    cyanocobalamin (VITAMIN B-12) 1000 MCG tablet Take 1,000 mcg by mouth daily 12/13/2024 at  8:30 AM    EnovaRX-Lidocaine HCl 5 % cream Apply topically 4 times daily as needed (shoulder pain) 12/12/2024 at  8:00 PM    famotidine (PEPCID) 20 MG tablet Take 2 tablets (40 mg) by mouth At Bedtime (Patient taking differently: Take 20 mg by mouth 2 times daily.) 12/13/2024 at  8:30 AM    gabapentin (NEURONTIN) 300 MG capsule Take 900 mg by mouth 2 times daily Morning and bedtime 12/13/2024 at  8:30 AM    insulin glargine (LANTUS PEN) 100 UNIT/ML pen Inject 28 Units Subcutaneous every morning (Patient taking differently: Inject 30 Units subcutaneously at bedtime.) 12/12/2024 at  8:00 PM    levothyroxine (SYNTHROID/LEVOTHROID) 50 MCG tablet Take 50 mcg by mouth daily before breakfast 12/13/2024 at  7:00 AM    Magnesium Oxide 420 MG TABS Take 420 mg by mouth daily 12/13/2024  at  8:30 AM    metFORMIN (GLUCOPHAGE XR) 500 MG 24 hr tablet Take 1,000 mg by mouth 2 times daily. 12/13/2024 at  8:30 AM    nitroGLYcerin (NITROSTAT) 0.4 MG sublingual tablet Place 0.4 mg under the tongue every 5 minutes as needed for chest pain Taking As Needed    pantoprazole (PROTONIX) 40 MG EC tablet Take 40 mg by mouth daily 1/2 hour before eating 12/13/2024 at  8:30 AM    Psyllium (NATURAL FIBER THERAPY) 48.57 % POWD Take 3 tsp by mouth daily as needed (constipation) 12/12/2024 Morning    senna (SENOKOT) 8.6 MG tablet Take 17.2 mg by mouth At Bedtime 12/12/2024 at  8:00 PM    vitamin C (ASCORBIC ACID) 500 MG tablet Take 500 mg by mouth daily 12/13/2024 at  8:30 AM    Vitamin D3 (CHOLECALCIFEROL) 25 mcg (1000 units) tablet Take 1 tablet by mouth daily 12/13/2024 at  8:30 AM    zinc sulfate (ZINCATE) 220 (50 Zn) MG capsule Take 50 mg by mouth daily 12/13/2024 at  8:30 AM

## 2024-12-14 VITALS
HEART RATE: 71 BPM | TEMPERATURE: 97.9 F | OXYGEN SATURATION: 94 % | SYSTOLIC BLOOD PRESSURE: 127 MMHG | RESPIRATION RATE: 18 BRPM | DIASTOLIC BLOOD PRESSURE: 70 MMHG

## 2024-12-14 LAB
ANION GAP SERPL CALCULATED.3IONS-SCNC: 14 MMOL/L (ref 7–15)
BUN SERPL-MCNC: 7.6 MG/DL (ref 8–23)
CALCIUM SERPL-MCNC: 9 MG/DL (ref 8.8–10.4)
CHLORIDE SERPL-SCNC: 103 MMOL/L (ref 98–107)
CREAT SERPL-MCNC: 0.75 MG/DL (ref 0.67–1.17)
EGFRCR SERPLBLD CKD-EPI 2021: 90 ML/MIN/1.73M2
ERYTHROCYTE [DISTWIDTH] IN BLOOD BY AUTOMATED COUNT: 14.5 % (ref 10–15)
GLUCOSE BLDC GLUCOMTR-MCNC: 147 MG/DL (ref 70–99)
GLUCOSE BLDC GLUCOMTR-MCNC: 153 MG/DL (ref 70–99)
GLUCOSE BLDC GLUCOMTR-MCNC: 182 MG/DL (ref 70–99)
GLUCOSE BLDC GLUCOMTR-MCNC: 214 MG/DL (ref 70–99)
GLUCOSE BLDC GLUCOMTR-MCNC: 225 MG/DL (ref 70–99)
GLUCOSE SERPL-MCNC: 147 MG/DL (ref 70–99)
HCO3 SERPL-SCNC: 20 MMOL/L (ref 22–29)
HCT VFR BLD AUTO: 34 % (ref 40–53)
HGB BLD-MCNC: 11.3 G/DL (ref 13.3–17.7)
INR PPP: 1.11 (ref 0.85–1.15)
LACTATE SERPL-SCNC: 1.2 MMOL/L (ref 0.7–2)
MCH RBC QN AUTO: 28.8 PG (ref 26.5–33)
MCHC RBC AUTO-ENTMCNC: 33.2 G/DL (ref 31.5–36.5)
MCV RBC AUTO: 87 FL (ref 78–100)
PLATELET # BLD AUTO: 177 10E3/UL (ref 150–450)
POTASSIUM SERPL-SCNC: 4.1 MMOL/L (ref 3.4–5.3)
RBC # BLD AUTO: 3.92 10E6/UL (ref 4.4–5.9)
SODIUM SERPL-SCNC: 137 MMOL/L (ref 135–145)
WBC # BLD AUTO: 13.3 10E3/UL (ref 4–11)

## 2024-12-14 PROCEDURE — 250N000012 HC RX MED GY IP 250 OP 636 PS 637: Performed by: NURSE PRACTITIONER

## 2024-12-14 PROCEDURE — 80048 BASIC METABOLIC PNL TOTAL CA: CPT | Performed by: INTERNAL MEDICINE

## 2024-12-14 PROCEDURE — 250N000013 HC RX MED GY IP 250 OP 250 PS 637: Performed by: NURSE PRACTITIONER

## 2024-12-14 PROCEDURE — 83605 ASSAY OF LACTIC ACID: CPT | Performed by: NURSE PRACTITIONER

## 2024-12-14 PROCEDURE — 85610 PROTHROMBIN TIME: CPT | Performed by: INTERNAL MEDICINE

## 2024-12-14 PROCEDURE — 36415 COLL VENOUS BLD VENIPUNCTURE: CPT | Performed by: NURSE PRACTITIONER

## 2024-12-14 PROCEDURE — 85018 HEMOGLOBIN: CPT | Performed by: INTERNAL MEDICINE

## 2024-12-14 PROCEDURE — 120N000001 HC R&B MED SURG/OB

## 2024-12-14 PROCEDURE — 250N000013 HC RX MED GY IP 250 OP 250 PS 637: Performed by: INTERNAL MEDICINE

## 2024-12-14 PROCEDURE — G0378 HOSPITAL OBSERVATION PER HR: HCPCS

## 2024-12-14 PROCEDURE — 36415 COLL VENOUS BLD VENIPUNCTURE: CPT | Performed by: INTERNAL MEDICINE

## 2024-12-14 PROCEDURE — 82374 ASSAY BLOOD CARBON DIOXIDE: CPT | Performed by: INTERNAL MEDICINE

## 2024-12-14 PROCEDURE — 99232 SBSQ HOSP IP/OBS MODERATE 35: CPT | Performed by: NURSE PRACTITIONER

## 2024-12-14 RX ORDER — SENNOSIDES 8.6 MG
2 TABLET ORAL AT BEDTIME
Status: DISCONTINUED | OUTPATIENT
Start: 2024-12-14 | End: 2024-12-15 | Stop reason: HOSPADM

## 2024-12-14 RX ORDER — ATORVASTATIN CALCIUM 40 MG/1
40 TABLET, FILM COATED ORAL AT BEDTIME
Status: DISCONTINUED | OUTPATIENT
Start: 2024-12-14 | End: 2024-12-15 | Stop reason: HOSPADM

## 2024-12-14 RX ORDER — LEVOTHYROXINE SODIUM 50 UG/1
50 TABLET ORAL
Status: DISCONTINUED | OUTPATIENT
Start: 2024-12-14 | End: 2024-12-15 | Stop reason: HOSPADM

## 2024-12-14 RX ORDER — AMOXICILLIN 500 MG/1
500 CAPSULE ORAL EVERY 8 HOURS SCHEDULED
Status: DISCONTINUED | OUTPATIENT
Start: 2024-12-14 | End: 2024-12-15 | Stop reason: HOSPADM

## 2024-12-14 RX ORDER — PANTOPRAZOLE SODIUM 40 MG/1
40 TABLET, DELAYED RELEASE ORAL DAILY
Status: DISCONTINUED | OUTPATIENT
Start: 2024-12-14 | End: 2024-12-15 | Stop reason: HOSPADM

## 2024-12-14 RX ORDER — GABAPENTIN 300 MG/1
900 CAPSULE ORAL 2 TIMES DAILY
Status: DISCONTINUED | OUTPATIENT
Start: 2024-12-14 | End: 2024-12-15 | Stop reason: HOSPADM

## 2024-12-14 RX ORDER — FAMOTIDINE 20 MG/1
20 TABLET, FILM COATED ORAL 2 TIMES DAILY
Status: DISCONTINUED | OUTPATIENT
Start: 2024-12-14 | End: 2024-12-15 | Stop reason: HOSPADM

## 2024-12-14 RX ORDER — ASPIRIN 81 MG/1
81 TABLET ORAL DAILY
Status: DISCONTINUED | OUTPATIENT
Start: 2024-12-14 | End: 2024-12-15 | Stop reason: HOSPADM

## 2024-12-14 RX ADMIN — SENNOSIDES 2 TABLET: 8.6 TABLET, FILM COATED ORAL at 20:41

## 2024-12-14 RX ADMIN — FAMOTIDINE 20 MG: 20 TABLET, FILM COATED ORAL at 20:40

## 2024-12-14 RX ADMIN — AMOXICILLIN 500 MG: 500 CAPSULE ORAL at 21:05

## 2024-12-14 RX ADMIN — GABAPENTIN 900 MG: 300 CAPSULE ORAL at 08:02

## 2024-12-14 RX ADMIN — AMOXICILLIN 500 MG: 500 CAPSULE ORAL at 10:07

## 2024-12-14 RX ADMIN — ATORVASTATIN CALCIUM 40 MG: 40 TABLET, FILM COATED ORAL at 20:40

## 2024-12-14 RX ADMIN — GABAPENTIN 900 MG: 300 CAPSULE ORAL at 20:41

## 2024-12-14 RX ADMIN — INSULIN GLARGINE 30 UNITS: 100 INJECTION, SOLUTION SUBCUTANEOUS at 20:42

## 2024-12-14 RX ADMIN — Medication 1 MG: at 20:40

## 2024-12-14 RX ADMIN — Medication 1 MG: at 01:22

## 2024-12-14 RX ADMIN — PANTOPRAZOLE SODIUM 40 MG: 40 TABLET, DELAYED RELEASE ORAL at 08:02

## 2024-12-14 RX ADMIN — AMOXICILLIN 500 MG: 500 CAPSULE ORAL at 13:43

## 2024-12-14 RX ADMIN — LEVOTHYROXINE SODIUM 50 MCG: 50 TABLET ORAL at 08:03

## 2024-12-14 RX ADMIN — FAMOTIDINE 20 MG: 20 TABLET, FILM COATED ORAL at 08:02

## 2024-12-14 RX ADMIN — ZOLPIDEM TARTRATE 2.5 MG: 5 TABLET ORAL at 22:51

## 2024-12-14 ASSESSMENT — ACTIVITIES OF DAILY LIVING (ADL)
ADLS_ACUITY_SCORE: 39
ADLS_ACUITY_SCORE: 40
ADLS_ACUITY_SCORE: 42
ADLS_ACUITY_SCORE: 40
ADLS_ACUITY_SCORE: 42
ADLS_ACUITY_SCORE: 40
ADLS_ACUITY_SCORE: 42
ADLS_ACUITY_SCORE: 39
ADLS_ACUITY_SCORE: 42
ADLS_ACUITY_SCORE: 40
ADLS_ACUITY_SCORE: 39
ADLS_ACUITY_SCORE: 40
ADLS_ACUITY_SCORE: 42
ADLS_ACUITY_SCORE: 42
ADLS_ACUITY_SCORE: 39
ADLS_ACUITY_SCORE: 39
ADLS_ACUITY_SCORE: 40
ADLS_ACUITY_SCORE: 39

## 2024-12-14 NOTE — PROGRESS NOTES
S-(situation): Patient arrives to room 253 via cart from ED    B-(background): UTI    A-(assessment): Alert and oriented to person only (baseline Alzheimers). BP elevated on RA. SBA with walker. Trace edema on BLE.     R-(recommendations): Orders reviewed with patient. Will monitor patient per MD orders.     Inpatient nursing criteria listed below were met:    Health care directives status obtained and documented: Yes  VTE ordered/documented: Yes  Skin issues/needs documented:NA  Isolation addressed and Signage used: NA  Fall Prevention: Care plan updated Yes Education given and documented Yes  Care Plan initiated and Co-Morbidities added: Yes  Education Assessment documented:Yes  Admission Education Documented: Yes  If present CAUTI/CLABI Education done: NA  New medication patient education completed and documented (Possible Side Effects of Common Medications handout): Yes  Allergies Reviewed: Yes  Admission Medication Reconciliation completed: Yes  Home medications if not able to send immediately home with family stored here: NA  Reminder note placed in discharge instructions regarding home meds: NA  Individualized care needs/preferences addressed and charted: Yes  Provider Notified that patient has arrived to the unit: Yes

## 2024-12-14 NOTE — PLAN OF CARE
Goal Outcome Evaluation:         Patient alert but confused/forgetful, oral abx, ambulating in [place] SBA with cane, blood tinged urine, insulin with meals

## 2024-12-14 NOTE — H&P
"[facility]    History and Physical - Hospitalist Service       Date of Admission:  12/13/2024    Assessment & Plan      Brannon Boggs is a 84 year old male admitted on 12/13/2024. He presented to the ED with shortness of breath and weakness. He was admitted for persistent UTI.     Urinary tract infection  He was found to have a UTI on 12/8/2024 and was started on cefdinir. Her culture was positive for enterococcus faecalis. He presents with weakness and urinary symptoms, so even though the UA is improved, I think he is still not fully treated on oral antibiotics.   - admit to hospitalist service  - start on ceftriaxone  - pain control  - trend lactic acid  - give IVF until lactic acid improves    T2DM  - hold metformin  - c/w insulin glargine 30 units at bedtime  - insulin sliding scale    CAD  Hypertension  Hyperlipidemia  - c/w aspirin  - c/w atorvastatin  - c/w nitroglycerin SL prn    HFmrEF  - I/O, daily weights  - not on any active medications currently.     Hypothyroidism  - c/w levothyroxine    Small B-Cell lymphoproliferative disorder  He is currently being treated for this. Monitor for now.        Diet: Combination Diet Moderate Consistent Carb (60 g CHO per Meal) Diet; Low Saturated Fat Na <2400mg Diet, No Caffeine Diet    DVT Prophylaxis: Pneumatic Compression Devices  Valdez Catheter: Not present  Lines: None     Code Status: Full Code      Clinically Significant Risk Factors Present on Admission         # Hyponatremia: Lowest Na = 134 mmol/L in last 2 days, will monitor as appropriate         # Drug Induced Platelet Defect: home medication list includes an antiplatelet medication   # Hypertension: Noted on problem list     # Dementia: noted on problem list       # Obesity: Estimated body mass index is 32.96 kg/m  as calculated from the following:    Height as of 12/8/24: 1.778 m (5' 10\").    Weight as of 12/8/24: 104.2 kg (229 lb 11.2 oz).        # Pacemaker present   "     Disposition Plan      Expected Discharge Date: 12/15/2024                The patient's care was discussed with the Bedside Nurse and Patient.        Sanjay Avelar MD  [facility]  Securely message with the Vocera Web Console (learn more here)  Text page via [employer] Paging/Directory      Visit/Communication Style   Virtual (Video) communication was used to evaluate [place] consented to the use of video communication: yes  Video START time: 2340, 12/13/2024  Video STOP time: 2350, 12/13/2024   Patient's location: [facility]   Provider's location during the visit: [facility] Tele-medicine site        ______________________________________________________________________    Chief Complaint   Weakness and shortness of breath    History is obtained from the patient    History of Present Illness   Brannon Boggs is a 84 year old male who presented to the ED with shortness of breath and weakness. He had come to the ED on 12/8/2024 and was found to be positive for a UTI. He was started on cefdinir. His urine culture came back positive for Enterococcus faecalis. He is a poor historian and could not remember anything. He has a headache and some peripheral numbness. It is not clear how chronic or acute these symptoms are. He otherwise feels well and does not think anything is really wrong.     Review of Systems    General: Positive for weakness, negative for fever, chills, sweats  Eyes: negative for blurred vision, loss of vision  Ear Nose and Throat: negative for pharyngitis, speech or swallowing difficulties  Respiratory:  negative for sputum production, wheezing, LOPEZ, pleuritic pain, sob or cough  Cardiology:  negative for chest pain, palpitations, orthopnea, PND, edema, syncope   Gastrointestinal: negative for abdominal pain, nausea, vomiting, diarrhea, constipation, hematemesis, melena or hematochezia  Genitourinary: negative for frequency, urgency,  dysuria, hematuria   Neurological: negative for focal weakness, paresthesia    Past Medical History    I have reviewed this patient's medical history and updated it with pertinent information if needed.   History reviewed. No pertinent past medical history.    Past Surgical History   I have reviewed this patient's surgical history and updated it with pertinent information if needed.  Past Surgical History:   Procedure Laterality Date    ENDOSCOPIC RETROGRADE CHOLANGIOPANCREATOGRAM N/A 11/18/2023    Procedure: ENDOSCOPIC RETROGRADE CHOLANGIOPANCREATOGRAPHY, WITH STENT PLACEMENT;  Surgeon: Jose Morse MD;  Location:  OR       Social History   I have reviewed this patient's social history and updated it with pertinent information if needed.  Social History     Tobacco Use    Smoking status: Former    Smokeless tobacco: Never   Vaping Use    Vaping status: Never Used   Substance Use Topics    Alcohol use: Not Currently    Drug use: Not Currently       Family History     Unable to obtain due to: dementia    Prior to Admission Medications   Prior to Admission Medications   Prescriptions Last Dose Informant Patient Reported? Taking?   EnovaRX-Lidocaine HCl 5 % cream 12/12/2024 at  8:00 PM  Yes Yes   Sig: Apply topically 4 times daily as needed (shoulder pain)   Magnesium Oxide 420 MG TABS 12/13/2024 at  8:30 AM  Yes Yes   Sig: Take 420 mg by mouth daily   Psyllium (NATURAL FIBER THERAPY) 48.57 % POWD 12/12/2024 Morning Spouse/Significant Other Yes Yes   Sig: Take 3 tsp by mouth daily as needed (constipation)   Vitamin D3 (CHOLECALCIFEROL) 25 mcg (1000 units) tablet 12/13/2024 at  8:30 AM Spouse/Significant Other Yes Yes   Sig: Take 1 tablet by mouth daily   acetaminophen (TYLENOL) 325 MG tablet 12/13/2024 at  1:00 PM  Yes Yes   Sig: Take 650 mg by mouth daily as needed for fever or pain   aspirin (ASA) 81 MG EC tablet 12/13/2024 at  8:30 AM Spouse/Significant Other Yes Yes   Sig: Take 81 mg by mouth daily    atorvastatin (LIPITOR) 40 MG tablet 12/12/2024 at  8:00 PM  No Yes   Sig: Take 1 tablet (40 mg) by mouth at bedtime   cyanocobalamin (VITAMIN B-12) 1000 MCG tablet 12/13/2024 at  8:30 AM Spouse/Significant Other Yes Yes   Sig: Take 1,000 mcg by mouth daily   diclofenac (VOLTAREN) 1 % topical gel   Yes Yes   Sig: Apply 2 g topically 4 times daily as needed (Muscular pain).   famotidine (PEPCID) 20 MG tablet 12/13/2024 at  8:30 AM Spouse/Significant Other No Yes   Sig: Take 2 tablets (40 mg) by mouth At Bedtime   Patient taking differently: Take 20 mg by mouth 2 times daily.   fluticasone (FLONASE) 50 MCG/ACT nasal spray   Yes Yes   Sig: Spray 2 sprays in nostril daily as needed for rhinitis.   gabapentin (NEURONTIN) 300 MG capsule 12/13/2024 at  8:30 AM Spouse/Significant Other Yes Yes   Sig: Take 900 mg by mouth 2 times daily Morning and bedtime   insulin glargine (LANTUS PEN) 100 UNIT/ML pen 12/12/2024 at  8:00 PM  No Yes   Sig: Inject 28 Units Subcutaneous every morning   Patient taking differently: Inject 30 Units subcutaneously at bedtime.   levothyroxine (SYNTHROID/LEVOTHROID) 50 MCG tablet 12/13/2024 at  7:00 AM Spouse/Significant Other Yes Yes   Sig: Take 50 mcg by mouth daily before breakfast   metFORMIN (GLUCOPHAGE XR) 500 MG 24 hr tablet 12/13/2024 at  8:30 AM  Yes Yes   Sig: Take 1,000 mg by mouth 2 times daily.   nitroGLYcerin (NITROSTAT) 0.4 MG sublingual tablet  Spouse/Significant Other Yes Yes   Sig: Place 0.4 mg under the tongue every 5 minutes as needed for chest pain   pantoprazole (PROTONIX) 40 MG EC tablet 12/13/2024 at  8:30 AM Spouse/Significant Other Yes Yes   Sig: Take 40 mg by mouth daily 1/2 hour before eating   senna (SENOKOT) 8.6 MG tablet 12/12/2024 at  8:00 PM Spouse/Significant Other Yes Yes   Sig: Take 17.2 mg by mouth At Bedtime   vitamin C (ASCORBIC ACID) 500 MG tablet 12/13/2024 at  8:30 AM Spouse/Significant Other Yes Yes   Sig: Take 500 mg by mouth daily   zinc sulfate  (ZINCATE) 220 (50 Zn) MG capsule 12/13/2024 at  8:30 AM Spouse/Significant Other Yes Yes   Sig: Take 50 mg by mouth daily      Facility-Administered Medications: None     Allergies   No Known Allergies    Physical Exam   Vital Signs: Temp: 97.4  F (36.3  C) Temp src: Oral BP: (!) 150/60 Pulse: 65   Resp: 16 SpO2: 96 % O2 Device: None (Room air)    Weight: 0 lbs 0 oz    Gen:  Well-developed, well-nourished, in no acute distress, lying semi-supine in hospital stretcher  HEENT:  Anicteric sclera, PER, hearing intact to voice  Resp:  No accessory muscle use, breath sounds clear; no wheezes no rales no rhonchi  Card:  No murmur, normal S1, S2, + systolic murmur  Abd:  Soft per RN exam, no TTP, non-distended, normoactive bowel sounds are present  Musc:  Normal strength and movement of the major muscle groups without obvious deformity  Psych:  Good insight, oriented to person, place and time, not anxious, not agitated  Neuro: CN 2-12, No focal weakness.   Data     Recent Labs   Lab 12/13/24  1648 12/08/24  1445   WBC 14.6* 13.6*   HGB 12.5* 12.5*   MCV 87 87    194   * 138   POTASSIUM 5.0 4.3   CHLORIDE 99 102   CO2 22 26   BUN 12.1 13.6   CR 0.84 0.87   ANIONGAP 13 10   EMILIE 9.0 9.2   * 173*   ALBUMIN 4.2 4.1   PROTTOTAL 7.0 7.0   BILITOTAL 0.4 0.4   ALKPHOS 155* 158*   ALT 16 14   AST 23 16   LIPASE 20  --          Recent Results (from the past 24 hours)   XR Chest Port 1 View    Narrative    EXAM: XR CHEST PORT 1 VIEW  LOCATION: [facility]  DATE: 12/13/2024    INDICATION: Cough.  COMPARISON: 10/1/2024      Impression    IMPRESSION:     No focal airspace disease. No pleural effusion or pneumothorax.    The cardiomediastinal silhouette is unremarkable. Coronary artery stent. Aortic calcifications.    Left subclavian approach pacemaker. Partially visualized right shoulder arthroplasty.

## 2024-12-14 NOTE — PLAN OF CARE
Goal Outcome Evaluation:      Plan of Care Reviewed With: patient    Overall Patient Progress: no changeOverall Patient Progress: no change    Outcome Evaluation: Alert and oriented to person only, baseline Alzheimer's, setting off bed alarm throughout the night. BP elevated, on RA. Denies any HA, dizziness, or blurry vision. Blood/brown urine output, denies pain. SBA with cane.

## 2024-12-14 NOTE — ED NOTES
ED Nursing criteria listed below was addressed during verbal handoff:     Abnormal vitals: Yes, SpO2 desats with activity and is slow to recover  Abnormal results: Yes  Med Reconciliation completed: Yes  Meds given in ED: Yes  Any Overdue Meds: No  Core Measures: Yes  Isolation: No  Special needs: Yes, skin risk, fall risk  Skin assessment: Yes    Observation Patient  Education provided: N/A

## 2024-12-14 NOTE — ED NOTES
Attempted to collected a UA, pt unable to void at this time. Will reattempt when fluids are finished.

## 2024-12-14 NOTE — PROGRESS NOTES
[facility]    Medicine Progress Note - Hospitalist Service    Date of Admission:  12/13/2024    Assessment & Plan   Patient presented to the ED with feelings of fatigue.  He was seen in the ED 12/8 due to hematuria and was treated for UTI.  He continues to have hematuria.   Due to ongoing weakness patient is referred to our service for admission     Acute cystitis with hematuria    Assessment: Seen in the ED 12/8 due to hematuria.  Urine culture appeared positive for UTI and patient started on Omnicef.  Urine culture showed ,000 Enterococcus.  Patient spouse reports she was called and told to change antibiotics earlier this week, per chart review he was changed to Augmentin.  patient he has remained afebrile but seemed weaker and more fatigued than usual with continued hematuria.  On admission patient started on Rocephin IV    Plan:   -Will stop Rocephin and start amoxicillin, per urine culture susceptibilities from 12/8 will plan on 4 more days of treatment for a total of 7 days.    -Holding aspirin at this time due to hematuria  -Discussed with spouse and patient that patient's hematuria may not necessarily be from a UTI but from bladder cancer and recommend follow-up with urology/oncology team    Lactic acidosis -resolved    Assessment: On admission the lactic acid elevated at 2.5.  Patient was given a total of 1 L IV fluids in the ED.  This morning recheck of lactic acid normal at 1.2    Plan:   -Encourage oral intake  Weakness  Assessment: Per family patient fatigued and weak at home.  Patient ambulating with standby assist with cane thus far during hospitalization  Plan:  -Patient to ambulate with nursing 3 times a day  -Consider PT consult if change in mobility    Leukocytosis, unspecified type    Assessment: Patient has chronically elevated WBC likely lymphoproliferative disorder.  Lymphocytes elevated at 8.7.  He has been afebrile.     Plan:   -Will not necessarily use  WBC to gauge response of treatment due to lymphoproliferative disorder  Dementia  Assessment: Patient has chronic dementia.  Lives at home with spouse.  He is forgetful, and a poor historian.  He is able to follow simple directions and mood is quite pleasant.  Plan:  -He is at risk for delirium given chronic dementia, monitor for acute delirium nothing noted at this time  Bladder cancer  Assessment: Patient has a history of urothelial carcinoma diagnosed in April 2024.  He is status post TURP with left ureteral stent placement.  [facility]  At this time family agrees with comfort measures and did not want to initiate additional therapy.  Currently they are continuing with watchful waiting and plan to intervene only if he begins to have local symptoms related to disease recurrence.  Concern new onset hematuria is more likely related to bladder cancer than an acute infection  Plan:  -Recommend patient follow-up with urology  Small B cell lymphoproliferative disorder  Assessment: Diagnosed in 2005 via bone marrow biopsy.  [facility].  Last visit noted 9/13/24.  He has been on surveillance and has never required treatment.  Plan:  -[facility] as [facility]  Type 2 DM  Assessment: Prior to admission medications include Lantus 30 units at bedtime and metformin.  Plan:  -Metformin held on admission  -Continue prior to admission Lantus:  -Accu-Cheks 4 times a day  -Sliding scale insulin at meals while hospitalized  CAD  HTN  HLD  Assessment: Does not appear to have any acute exacerbation.  Blood pressures have been stable  Plan:  -Continue prior to admission medications atorvastatin  -Currently holding aspirin due to hematuria  HFmrEF  Assessment: History of but currently not on any treatment.  Appears euvolemic  Plan:  -Continue to monitor for signs and symptoms of heart failure during hospital stay  Hypothyroidism  Assessment: Chronically takes  "levothyroxine, TSH within normal limits  Plan:  -Continue prior to admission levothyroxine            Diet: Combination Diet Moderate Consistent Carb (60 g CHO per Meal) Diet; Low Saturated Fat Na <2400mg Diet, No Caffeine Diet    DVT Prophylaxis: Pneumatic Compression Devices  Valdez Catheter: Not present  Lines: None     Cardiac Monitoring: None  Code Status: Full Code      Clinically Significant Risk Factors Present on Admission         # Hyponatremia: Lowest Na = 134 mmol/L in last 2 days, will monitor as appropriate         # Drug Induced Platelet Defect: home medication list includes an antiplatelet medication   # Hypertension: Noted on problem list     # Dementia: noted on problem list       # Obesity: Estimated body mass index is 32.96 kg/m  as calculated from the following:    Height as of 12/8/24: 1.778 m (5' 10\").    Weight as of 12/8/24: 104.2 kg (229 lb 11.2 oz).        # Pacemaker present       Social Drivers of Health    Tobacco Use: Medium Risk (7/13/2024)    Patient History     Smoking Tobacco Use: Former     Smokeless Tobacco Use: Never   Physical Activity: Inactive (4/15/2019)    Received from [employer] [facility] [employer]    Exercise Vital Sign     Days of Exercise per Week: 0 days     Minutes of Exercise per Session: 0 min    Received from [employer], [employer]          Disposition Plan     Medically Ready for Discharge: Anticipated Tomorrow           The patient's care was discussed with the Patient and Patient's Family.  Patient's spouse Rossana present during visit, updated on status and plan of care    Taniya Selby CNP  Hospitalist Service  [facility]  Securely message with [employer] (more info)  Text page via [facility] Paging/Directory   ______________________________________________________________________    Interval " History   No acute events overnight, nursing notes reviewed.  Patient has no new concerns or symptoms this morning    Physical Exam   Vital Signs: Temp: 97.6  F (36.4  C) Temp src: Oral BP: (!) 148/65 Pulse: 65   Resp: 18 SpO2: 94 % O2 Device: None (Room air)    Weight: 0 lbs 0 oz    General Appearance: Sitting on edge of bed, no acute distress  Respiratory: Respiratory effort easy at rest, lung sounds clear  Cardiovascular: Regular rate and rhythm, no murmur  GI: Abdomen soft and nontender, active bowel sounds  Skin: Grossly intact  Neuro: Alert, oriented to self, able to follow simple directions, easily distracted.  Able to move all extremities spontaneously    Medical Decision Making       45 MINUTES SPENT BY ME on the date of service doing chart review, history, exam, documentation & further activities per the note.      Data     I have personally reviewed the following data over the past 24 hrs:    13.3 (H)  \   11.3 (L)   / 177     137 103 7.6 (L) /  147 (H)   4.1 20 (L) 0.75 \     ALT: 16 AST: 23 AP: 155 (H) TBILI: 0.4   ALB: 4.2 TOT PROTEIN: 7.0 LIPASE: 20     Trop: 15 BNP: N/A     TSH: 2.98 T4: N/A A1C: N/A     Procal: 0.04 CRP: N/A Lactic Acid: 1.2       INR:  1.11 PTT:  N/A   D-dimer:  N/A Fibrinogen:  N/A       Imaging results reviewed over the past 24 hrs:   Recent Results (from the past 24 hours)   XR Chest Port 1 View    Narrative    EXAM: XR CHEST PORT 1 VIEW  LOCATION: [facility]  DATE: 12/13/2024    INDICATION: Cough.  COMPARISON: 10/1/2024      Impression    IMPRESSION:     No focal airspace disease. No pleural effusion or pneumothorax.    The cardiomediastinal silhouette is unremarkable. Coronary artery stent. Aortic calcifications.    Left subclavian approach pacemaker. Partially visualized right shoulder arthroplasty.

## 2024-12-15 LAB
ANION GAP SERPL CALCULATED.3IONS-SCNC: 12 MMOL/L (ref 7–15)
BUN SERPL-MCNC: 6.4 MG/DL (ref 8–23)
CALCIUM SERPL-MCNC: 9.1 MG/DL (ref 8.8–10.4)
CHLORIDE SERPL-SCNC: 99 MMOL/L (ref 98–107)
CREAT SERPL-MCNC: 0.76 MG/DL (ref 0.67–1.17)
EGFRCR SERPLBLD CKD-EPI 2021: 89 ML/MIN/1.73M2
ERYTHROCYTE [DISTWIDTH] IN BLOOD BY AUTOMATED COUNT: 14.4 % (ref 10–15)
GLUCOSE BLDC GLUCOMTR-MCNC: 133 MG/DL (ref 70–99)
GLUCOSE BLDC GLUCOMTR-MCNC: 150 MG/DL (ref 70–99)
GLUCOSE SERPL-MCNC: 135 MG/DL (ref 70–99)
HCO3 SERPL-SCNC: 22 MMOL/L (ref 22–29)
HCT VFR BLD AUTO: 33.2 % (ref 40–53)
HGB BLD-MCNC: 11 G/DL (ref 13.3–17.7)
MCH RBC QN AUTO: 28.4 PG (ref 26.5–33)
MCHC RBC AUTO-ENTMCNC: 33.1 G/DL (ref 31.5–36.5)
MCV RBC AUTO: 86 FL (ref 78–100)
PLATELET # BLD AUTO: 192 10E3/UL (ref 150–450)
POTASSIUM SERPL-SCNC: 3.8 MMOL/L (ref 3.4–5.3)
RBC # BLD AUTO: 3.88 10E6/UL (ref 4.4–5.9)
SODIUM SERPL-SCNC: 133 MMOL/L (ref 135–145)
WBC # BLD AUTO: 13.8 10E3/UL (ref 4–11)

## 2024-12-15 PROCEDURE — 82374 ASSAY BLOOD CARBON DIOXIDE: CPT | Performed by: NURSE PRACTITIONER

## 2024-12-15 PROCEDURE — 85014 HEMATOCRIT: CPT | Performed by: NURSE PRACTITIONER

## 2024-12-15 PROCEDURE — 250N000013 HC RX MED GY IP 250 OP 250 PS 637: Performed by: INTERNAL MEDICINE

## 2024-12-15 PROCEDURE — 85048 AUTOMATED LEUKOCYTE COUNT: CPT | Performed by: NURSE PRACTITIONER

## 2024-12-15 PROCEDURE — 250N000013 HC RX MED GY IP 250 OP 250 PS 637: Performed by: NURSE PRACTITIONER

## 2024-12-15 PROCEDURE — 99239 HOSP IP/OBS DSCHRG MGMT >30: CPT | Performed by: NURSE PRACTITIONER

## 2024-12-15 PROCEDURE — G0378 HOSPITAL OBSERVATION PER HR: HCPCS

## 2024-12-15 PROCEDURE — 80048 BASIC METABOLIC PNL TOTAL CA: CPT | Performed by: NURSE PRACTITIONER

## 2024-12-15 PROCEDURE — 36415 COLL VENOUS BLD VENIPUNCTURE: CPT | Performed by: NURSE PRACTITIONER

## 2024-12-15 RX ORDER — ZOLPIDEM TARTRATE 5 MG/1
5 TABLET ORAL
Status: DISCONTINUED | OUTPATIENT
Start: 2024-12-15 | End: 2024-12-15 | Stop reason: HOSPADM

## 2024-12-15 RX ORDER — AMOXICILLIN 500 MG/1
500 CAPSULE ORAL EVERY 8 HOURS
Qty: 8 CAPSULE | Refills: 0 | Status: SHIPPED | OUTPATIENT
Start: 2024-12-15 | End: 2024-12-18

## 2024-12-15 RX ADMIN — AMOXICILLIN 500 MG: 500 CAPSULE ORAL at 06:05

## 2024-12-15 RX ADMIN — GABAPENTIN 900 MG: 300 CAPSULE ORAL at 10:50

## 2024-12-15 RX ADMIN — LEVOTHYROXINE SODIUM 50 MCG: 50 TABLET ORAL at 07:51

## 2024-12-15 RX ADMIN — ZOLPIDEM TARTRATE 2.5 MG: 5 TABLET ORAL at 01:35

## 2024-12-15 RX ADMIN — PANTOPRAZOLE SODIUM 40 MG: 40 TABLET, DELAYED RELEASE ORAL at 10:50

## 2024-12-15 RX ADMIN — FAMOTIDINE 20 MG: 20 TABLET, FILM COATED ORAL at 10:50

## 2024-12-15 ASSESSMENT — ACTIVITIES OF DAILY LIVING (ADL)
ADLS_ACUITY_SCORE: 40

## 2024-12-15 NOTE — PLAN OF CARE
Goal Outcome Evaluation:      Plan of Care Reviewed With: patient    Overall Patient Progress: improvingOverall Patient Progress: improving    Outcome Evaluation: Alert and oriented to person only (baseline Alzheimer's), able to reorient. SBA. Ambien x 2 for sleep. Continues to have blood-tinged urine, denies pain.

## 2024-12-15 NOTE — PROVIDER NOTIFICATION
Informed provider of pt being restless, having difficulty sleeping. Ordered/given prn ambien. Approx 2.5 hours after administrating, informed that the initial dose was ineffective, provider ordered another dose and was given.

## 2024-12-15 NOTE — DISCHARGE SUMMARY
"[facility]  Hospitalist Discharge Summary      Date of Admission:  12/13/2024  Date of Discharge:  12/15/2024  Discharging Provider: Taniya Selby CNP  Discharge Service: Hospitalist Service    Discharge Diagnoses   Acute cystitis with hematuria  Lactic acidosis-resolved  Weakness  Leukocytosis  Dementia  Bladder cancer  Small B-cell lymphoproliferative disorder  Type 2 diabetes  CAD  Hypertension  Hyperlipidemia  Heart failure mildly reduced EF  Hypothyroidism    Clinically Significant Risk Factors     # Obesity: Estimated body mass index is 32.96 kg/m  as calculated from the following:    Height as of 12/8/24: 1.778 m (5' 10\").    Weight as of 12/8/24: 104.2 kg (229 lb 11.2 oz).       Follow-ups Needed After Discharge   Follow-up Appointments       Follow-up and recommended labs and tests       1.  Follow up with primary care provider, [facility], within 7 days for hospital follow- up.  The following labs/tests are recommended: Hgb, bmp  2.  [facility]                Unresulted Labs Ordered in the Past 30 Days of this Admission       Date and Time Order Name Status Description    12/13/2024  4:26 PM Blood Culture Arm, Right Preliminary     12/13/2024  4:26 PM Blood Culture Arm, Left Preliminary         These results will be followed up by hospitalist    Discharge Disposition   Discharged to home  Condition at discharge: Stable    Hospital Course   Patient presented to the ED with feelings of fatigue.  He was seen in the ED 12/8 due to hematuria and was treated for UTI.  He continues to have hematuria.   Due to ongoing weakness patient is referred to our service for admission     Acute cystitis with hematuria    Assessment: Seen in the ED 12/8 due to hematuria.  Urine culture appeared positive for UTI and patient started on Omnicef.  Urine culture showed ,000 Enterococcus.  Patient spouse reports she was called and " told to change antibiotics earlier this week, per chart review he was changed to Augmentin.  patient he has remained afebrile but seemed weaker and more fatigued than usual with continued hematuria.  On admission patient started on Rocephin IV, this was discontinued and patient started on amoxicillin based on urine culture susceptibilities from 12/8.  He has remained afebrile overnight and white count has been stable    Plan:   -Continue amoxicillin 3 more days of treatment for a total of 7 days.    -Holding aspirin at this time due to hematuria  -Discussed with spouse and patient that patient's hematuria may not necessarily be from a UTI but from bladder cancer and recommend follow-up with urology/oncology team    Lactic acidosis -resolved    Assessment: On admission the lactic acid elevated at 2.5.  Patient was given a total of 1 L IV fluids in the ED.  This morning recheck of lactic acid normal at 1.2.  Patient has had good oral intake during hospital stay    Plan:   -Encourage oral intake  Weakness  Assessment: Per family patient fatigued and weak at home.  Patient ambulating with standby assist with cane thus far during hospitalization    Leukocytosis, unspecified type    Assessment: Patient has chronically elevated WBC likely lymphoproliferative disorder.  Lymphocytes elevated at 8.7.  He has been afebrile.  On discharge WBC stable at 13.8    Dementia  Assessment: Patient has chronic dementia.  Lives at home with spouse.  He is forgetful, and a poor historian.  He is able to follow simple directions and mood is quite pleasant.  Last night patient had difficulty sleeping, was given Ambien 2.5 mg x 2.  Sleeping soundly this morning but able to be woken up without difficulty.  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  He is status post TURP with left ureteral stent placement.  [facility]  At this time  family agrees with comfort measures and did not want to initiate additional therapy.  Currently they are continuing with watchful waiting and plan to intervene only if he begins to have local symptoms related to disease recurrence.  Concern new onset hematuria is more likely related to bladder cancer than an acute infection  Plan:  -Recommend patient follow-up with urology  Small B cell lymphoproliferative disorder  Assessment: Diagnosed in 2005 via bone marrow biopsy.  [facility].  Last visit noted 9/13/24.  He has been on surveillance and has never required treatment.  Plan:  -[facility] as [facility]  Type 2 DM  Assessment: Prior to admission medications include Lantus 30 units at bedtime and metformin.  Metformin held on admission.  Blood sugars stable during hospital stay.  Plan:  -Resume prior to admission metformin and Lantus on discharge  CAD  HTN  HLD  Assessment: Does not appear to have any acute exacerbation.  Blood pressures have been stable  Plan:  -Continue prior to admission medications atorvastatin  -Currently holding aspirin due to hematuria  HFmrEF  Assessment: History of but currently not on any treatment.  Appears euvolemic  Plan:  -Continue to monitor for signs and symptoms of heart failure during hospital stay  Hypothyroidism  Assessment: Chronically takes levothyroxine, TSH within normal limits  Plan:  -Continue prior to admission levothyroxine      Consultations This Hospital Stay   None    Code Status   Full Code    Time Spent on this Encounter   I, Taniya Selby CNP, personally saw the patient today and spent greater than 30 minutes discharging this patient.       Taniya Selby CNP  [address] MEDICAL SURGICAL  [address] [place] [address]  Phone: [phone]  ______________________________________________________________________    Physical Exam   Vital Signs: Temp: 97.9  F (36.6  C) Temp src: Oral BP: 127/70 Pulse: 71    Resp: 18 SpO2: 94 % O2 Device: None (Room air)    Weight: 0 lbs 0 oz    General Appearance:  Sitting in chair, no acute distress  Respiratory: Respiratory effort easy at rest, lung sounds clear  Cardiovascular: Regular rate and rhythm, no murmur  GI: Abdomen soft and nontender, active bowel sounds  Skin: Grossly intact  Neuro: Alert, oriented to self, able to follow simple directions, easily distracted.  Able to move all extremities spontaneously       Primary Care Physician   [facility]    Discharge Orders      Reason for your hospital stay    You were admitted to the hospital due to hematuria, weakness.     Follow-up and recommended labs and tests     1.  Follow up with primary care provider, [facility], within 7 days for hospital follow- up.  The following labs/tests are recommended: Hgb, bmp  2.  [facility]     Activity    Your activity upon discharge: activity as tolerated     Diet    Follow this diet upon discharge: Regular       Significant Results and Procedures   Most Recent 3 CBC's:  Recent Labs   Lab Test 12/15/24  0545 12/14/24  0544 12/13/24  1648   WBC 13.8* 13.3* 14.6*   HGB 11.0* 11.3* 12.5*   MCV 86 87 87    177 199     Most Recent 3 BMP's:  Recent Labs   Lab Test 12/15/24  0803 12/15/24  0545 12/15/24  0225 12/14/24  0725 12/14/24  0544 12/13/24  2300 12/13/24  1648   NA  --  133*  --   --  137  --  134*   POTASSIUM  --  3.8  --   --  4.1  --  5.0   CHLORIDE  --  99  --   --  103  --  99   CO2  --  22  --   --  20*  --  22   BUN  --  6.4*  --   --  7.6*  --  12.1   CR  --  0.76  --   --  0.75  --  0.84   ANIONGAP  --  12  --   --  14  --  13   EMILIE  --  9.1  --   --  9.0  --  9.0   * 135* 150*   < > 147*   < > 187*    < > = values in this interval not displayed.     7-Day Micro Results       Collected Updated Procedure Result Status      12/13/2024 1653 12/13/2024 1735 Influenza A/B, RSV and SARS-CoV2  PCR (COVID-19) Nasopharyngeal [[id number]]    Swab from Nasopharyngeal    Final result Component Value   Influenza A PCR Negative   Influenza B PCR Negative   RSV PCR Negative   SARS CoV2 PCR Negative   NEGATIVE: SARS-CoV-2 (COVID-19) RNA not detected, presumed negative.            12/13/2024 1648 12/14/2024 2231 Blood Culture Arm, Left [[id number]]   Blood from Arm, Left    Preliminary result Component Value   Culture No growth after 1 day  [P]                12/13/2024 1648 12/14/2024 2231 Blood Culture Arm, Right [[id number]]   Blood from Arm, Right    Preliminary result Component Value   Culture No growth after 1 day  [P]                12/08/2024 1602 12/10/2024 0514 Urine Culture [[id number]]    (Abnormal)   Urine, Midstream    Final result Component Value   Culture 50,000-100,000 CFU/mL Enterococcus faecalis        Susceptibility        Enterococcus faecalis      CARLITOS      Ampicillin <=2 ug/mL Susceptible      Nitrofurantoin <=16 ug/mL Susceptible      Vancomycin 1 ug/mL Susceptible                                 Most Recent TSH and T4:  Recent Labs   Lab Test 12/13/24  1648 02/12/21  0541   TSH 2.98 4.28*   T4  --  0.84   ,   Results for orders placed or performed during the hospital encounter of 12/13/24   XR Chest Port 1 View    Narrative    EXAM: XR CHEST PORT 1 VIEW  LOCATION: [facility]  DATE: 12/13/2024    INDICATION: Cough.  COMPARISON: 10/1/2024      Impression    IMPRESSION:     No focal airspace disease. No pleural effusion or pneumothorax.    The cardiomediastinal silhouette is unremarkable. Coronary artery stent. Aortic calcifications.    Left subclavian approach pacemaker. Partially visualized right shoulder arthroplasty.       Discharge Medications   Current Discharge Medication List        START taking these medications    Details   amoxicillin (AMOXIL) 500 MG capsule Take 1 capsule (500 mg) by mouth every 8 hours for 8 doses.  Qty: 8 capsule, Refills: 0     Associated Diagnoses: Urinary tract infection with hematuria, site unspecified           CONTINUE these medications which have NOT CHANGED    Details   acetaminophen (TYLENOL) 325 MG tablet Take 650 mg by mouth daily as needed for fever or pain      aspirin (ASA) 81 MG EC tablet Take 81 mg by mouth daily      atorvastatin (LIPITOR) 40 MG tablet Take 1 tablet (40 mg) by mouth at bedtime    Associated Diagnoses: Hypercholesteremia      cyanocobalamin (VITAMIN B-12) 1000 MCG tablet Take 1,000 mcg by mouth daily      diclofenac (VOLTAREN) 1 % topical gel Apply 2 g topically 4 times daily as needed (Muscular pain).      EnovaRX-Lidocaine HCl 5 % cream Apply topically 4 times daily as needed (shoulder pain)      famotidine (PEPCID) 20 MG tablet Take 2 tablets (40 mg) by mouth At Bedtime      fluticasone (FLONASE) 50 MCG/ACT nasal spray Spray 2 sprays in nostril daily as needed for rhinitis.      gabapentin (NEURONTIN) 300 MG capsule Take 900 mg by mouth 2 times daily Morning and bedtime      insulin glargine (LANTUS PEN) 100 UNIT/ML pen Inject 28 Units Subcutaneous every morning    Comments: If Lantus is not covered by insurance, may substitute Basaglar or Semglee or other insulin glargine product per insurance preference at same dose and frequency.    Associated Diagnoses: Type 2 diabetes mellitus without complication, with long-term current use of insulin (H)      levothyroxine (SYNTHROID/LEVOTHROID) 50 MCG tablet Take 50 mcg by mouth daily before breakfast      Magnesium Oxide 420 MG TABS Take 420 mg by mouth daily      metFORMIN (GLUCOPHAGE XR) 500 MG 24 hr tablet Take 1,000 mg by mouth 2 times daily.      nitroGLYcerin (NITROSTAT) 0.4 MG sublingual tablet Place 0.4 mg under the tongue every 5 minutes as needed for chest pain      pantoprazole (PROTONIX) 40 MG EC tablet Take 40 mg by mouth daily 1/2 hour before eating      Psyllium (NATURAL FIBER THERAPY) 48.57 % POWD Take 3 tsp by mouth daily as needed  (constipation)      senna (SENOKOT) 8.6 MG tablet Take 17.2 mg by mouth At Bedtime      vitamin C (ASCORBIC ACID) 500 MG tablet Take 500 mg by mouth daily      Vitamin D3 (CHOLECALCIFEROL) 25 mcg (1000 units) tablet Take 1 tablet by mouth daily      zinc sulfate (ZINCATE) 220 (50 Zn) MG capsule Take 50 mg by mouth daily           Allergies   No Known Allergies

## 2024-12-15 NOTE — DISCHARGE INSTRUCTIONS
[facility] [place] [facility]16 to make a hospital follow up appointment with Primary Care Provider!

## 2024-12-15 NOTE — PROGRESS NOTES
Digital Medicine: Health  Introduction    Introduced Deniz Paulino to Digital Medicine. Discussed health  role and recommended lifestyle modifications.    The history is provided by the patient. No  was used.     HYPERTENSION  Our goal is to get BP to consistently below 130/80mmHg and make the process convenient so patient can avoid extra trips to the office. Getting your blood pressure below 130/80mmHg (definition of control) will reduce your risk for heart attack, kidney failure, stroke and death (as well as kidney failure, eye disease, & dementia)      Reviewed that the Digital Medicine care team - consisting of a clinician and a health  - will follow the most current evidence-based national guidelines for treating your condition.  The health  will focus on lifestyle modifications and motivation while the clinician will focus on medication therapy.  The care team will review all data on a regular basis and reach out as needed.      Explained that one of the key parts of the program is communication with the care team.  Asked patient to respond to outreach attempts and complete questionnaires.  Stressed importance of medication adherence.    Reviewed non-pharmacologic therapies and impact on BP.      Explained that we expect patient to obtain several blood pressures per week at random times of day.  Instructed patient not to allow anyone else to use phone and monitoring device.  Confirmed appropriate BP monitoring technique.      Explained to patient that the digital medicine team is not available for emergencies.  [facility] ([phone] or [phone]) or [phone] if needed.      Patient's BP goal is 130/80.Patient's BP average is 145/89 mmHg, which is above goal, per 2017 ACC/AHA Hypertension Guidelines.          Last 5 Patient Entered Readings                                      Current 30 Day Average: 145/89     Recent Readings 5/27/2020  S-(situation): Patient discharged to home via wheelchair with wife    B-(background): acute cystitis with hematuria    A-(assessment): vss, denies pain, ambulating well, oral abx    R-(recommendations): Discharge instructions reviewed with patient. Listed belongings gathered and returned to patient.          Discharge Nursing Criteria:   Patient Education given and documented: (STROKE, CHF, Diabetes):  { na    Care Plan and Patient education resolved: Yes    New Medications- pt has been educated about purpose and side effects: Yes    Vaccines  Influenza status verified at discharge:  Yes      MISC  Prescriptions if needed, hard copies sent with patient  NA  Home medications returned to patient: NA  Medication Bin checked and emptied on discharge Yes  Patient reports post-discharge pain management plan is effective: Yes         5/27/2020 5/26/2020 5/26/2020    SBP (mmHg) 147 150 142 146    DBP (mmHg) 90 88 84 92    Pulse 59 56 62 61            INTERVENTION(S)  reviewed appropriate dose schedule, recommended diet modifications, recommend physical activity, reviewed monitoring technique and encouragement/support    PLAN  patient verbalizes understanding and continue monitoring    Will f/u in 3 weeks, sooner if concerns.       There are no preventive care reminders to display for this patient.    Reviewed the importance of self-monitoring, medication adherence, and that the health  can be used as a resource for lifestyle modifications to help reduce or maintain a healthy lifestyle.    Sent link to [facility] Digital Medicine webpages and my contact information via [employer] for future questions. Follow up scheduled.             Diet Screening       Encouraged to decrease sodium intake to <2,000 mg per day and recommend DASH diet.     Physical Activity Screening       Encouraged to increase exercise to at least 150 minutes per week.        SDOH

## 2024-12-17 ENCOUNTER — PATIENT OUTREACH (OUTPATIENT)
Dept: CARE COORDINATION | Facility: CLINIC | Age: 84
End: 2024-12-17
Payer: COMMERCIAL

## 2024-12-17 NOTE — PROGRESS NOTES
Clinic Care Coordination Contact  [facility]/Voicemail    Clinical Data: Care Coordinator Outreach    Outreach Documentation Number of Outreach Attempt   12/17/2024  11:31 AM 2       Unable to leave a message due to: Spouse stated he wasn't home but will have him call if any questions.      Care Coordinator will do no further outreaches at this time.    Sera Dowling  [phone]  Care

## 2024-12-18 LAB
BACTERIA BLD CULT: NO GROWTH
BACTERIA BLD CULT: NO GROWTH

## 2025-02-07 ENCOUNTER — HOSPITAL ENCOUNTER (EMERGENCY)
Facility: CLINIC | Age: 85
Discharge: HOME | End: 2025-02-07
Attending: STUDENT IN AN ORGANIZED HEALTH CARE EDUCATION/TRAINING PROGRAM | Admitting: STUDENT IN AN ORGANIZED HEALTH CARE EDUCATION/TRAINING PROGRAM
Payer: COMMERCIAL

## 2025-02-07 VITALS
RESPIRATION RATE: 18 BRPM | TEMPERATURE: 97.4 F | WEIGHT: 231.2 LBS | SYSTOLIC BLOOD PRESSURE: 152 MMHG | BODY MASS INDEX: 33.17 KG/M2 | OXYGEN SATURATION: 98 % | HEART RATE: 60 BPM | DIASTOLIC BLOOD PRESSURE: 73 MMHG

## 2025-02-07 DIAGNOSIS — Z79.4 TYPE 2 DIABETES MELLITUS WITH HYPERGLYCEMIA, WITH LONG-TERM CURRENT USE OF INSULIN (H): ICD-10-CM

## 2025-02-07 DIAGNOSIS — E11.65 TYPE 2 DIABETES MELLITUS WITH HYPERGLYCEMIA, WITH LONG-TERM CURRENT USE OF INSULIN (H): ICD-10-CM

## 2025-02-07 DIAGNOSIS — N30.01 ACUTE CYSTITIS WITH HEMATURIA: Primary | ICD-10-CM

## 2025-02-07 LAB
ALBUMIN UR-MCNC: 70 MG/DL
ANION GAP SERPL CALCULATED.3IONS-SCNC: 10 MMOL/L (ref 7–15)
APPEARANCE UR: ABNORMAL
ATRIAL RATE - MUSE: 66 BPM
BASOPHILS # BLD AUTO: 0.1 10E3/UL (ref 0–0.2)
BASOPHILS NFR BLD AUTO: 1 %
BILIRUB UR QL STRIP: NEGATIVE
BUN SERPL-MCNC: 15.5 MG/DL (ref 8–23)
CALCIUM SERPL-MCNC: 8.7 MG/DL (ref 8.8–10.4)
CHLORIDE SERPL-SCNC: 100 MMOL/L (ref 98–107)
COLOR UR AUTO: ABNORMAL
CREAT SERPL-MCNC: 0.89 MG/DL (ref 0.67–1.17)
DIASTOLIC BLOOD PRESSURE - MUSE: NORMAL MMHG
EGFRCR SERPLBLD CKD-EPI 2021: 85 ML/MIN/1.73M2
EOSINOPHIL # BLD AUTO: 0.3 10E3/UL (ref 0–0.7)
EOSINOPHIL NFR BLD AUTO: 2 %
ERYTHROCYTE [DISTWIDTH] IN BLOOD BY AUTOMATED COUNT: 14.2 % (ref 10–15)
GLUCOSE SERPL-MCNC: 234 MG/DL (ref 70–99)
GLUCOSE UR STRIP-MCNC: 500 MG/DL
HCO3 SERPL-SCNC: 24 MMOL/L (ref 22–29)
HCT VFR BLD AUTO: 33.1 % (ref 40–53)
HGB BLD-MCNC: 10.4 G/DL (ref 13.3–17.7)
HGB UR QL STRIP: ABNORMAL
IMM GRANULOCYTES # BLD: 0 10E3/UL
IMM GRANULOCYTES NFR BLD: 0 %
INTERPRETATION ECG - MUSE: NORMAL
KETONES UR STRIP-MCNC: NEGATIVE MG/DL
LEUKOCYTE ESTERASE UR QL STRIP: ABNORMAL
LYMPHOCYTES # BLD AUTO: 6 10E3/UL (ref 0.8–5.3)
LYMPHOCYTES NFR BLD AUTO: 58 %
MCH RBC QN AUTO: 26.5 PG (ref 26.5–33)
MCHC RBC AUTO-ENTMCNC: 31.4 G/DL (ref 31.5–36.5)
MCV RBC AUTO: 84 FL (ref 78–100)
MONOCYTES # BLD AUTO: 0.8 10E3/UL (ref 0–1.3)
MONOCYTES NFR BLD AUTO: 8 %
NEUTROPHILS # BLD AUTO: 3.3 10E3/UL (ref 1.6–8.3)
NEUTROPHILS NFR BLD AUTO: 32 %
NITRATE UR QL: NEGATIVE
NRBC # BLD AUTO: 0 10E3/UL
NRBC BLD AUTO-RTO: 0 /100
P AXIS - MUSE: 22 DEGREES
PH UR STRIP: 6 [PH] (ref 5–7)
PLAT MORPH BLD: NORMAL
PLATELET # BLD AUTO: 220 10E3/UL (ref 150–450)
POTASSIUM SERPL-SCNC: 4.7 MMOL/L (ref 3.4–5.3)
PR INTERVAL - MUSE: 214 MS
QRS DURATION - MUSE: 194 MS
QT - MUSE: 470 MS
QTC - MUSE: 492 MS
R AXIS - MUSE: -55 DEGREES
RBC # BLD AUTO: 3.92 10E6/UL (ref 4.4–5.9)
RBC MORPH BLD: NORMAL
RBC URINE: >182 /HPF
SODIUM SERPL-SCNC: 134 MMOL/L (ref 135–145)
SP GR UR STRIP: 1.01 (ref 1–1.03)
SYSTOLIC BLOOD PRESSURE - MUSE: NORMAL MMHG
T AXIS - MUSE: 112 DEGREES
TROPONIN T SERPL HS-MCNC: 16 NG/L
TROPONIN T SERPL HS-MCNC: 17 NG/L
UROBILINOGEN UR STRIP-MCNC: NORMAL MG/DL
VENTRICULAR RATE- MUSE: 66 BPM
WBC # BLD AUTO: 10.4 10E3/UL (ref 4–11)
WBC URINE: >182 /HPF

## 2025-02-07 PROCEDURE — 96365 THER/PROPH/DIAG IV INF INIT: CPT | Performed by: STUDENT IN AN ORGANIZED HEALTH CARE EDUCATION/TRAINING PROGRAM

## 2025-02-07 PROCEDURE — 87186 SC STD MICRODIL/AGAR DIL: CPT | Performed by: STUDENT IN AN ORGANIZED HEALTH CARE EDUCATION/TRAINING PROGRAM

## 2025-02-07 PROCEDURE — 81003 URINALYSIS AUTO W/O SCOPE: CPT | Performed by: STUDENT IN AN ORGANIZED HEALTH CARE EDUCATION/TRAINING PROGRAM

## 2025-02-07 PROCEDURE — 80048 BASIC METABOLIC PNL TOTAL CA: CPT | Performed by: STUDENT IN AN ORGANIZED HEALTH CARE EDUCATION/TRAINING PROGRAM

## 2025-02-07 PROCEDURE — 36415 COLL VENOUS BLD VENIPUNCTURE: CPT | Performed by: STUDENT IN AN ORGANIZED HEALTH CARE EDUCATION/TRAINING PROGRAM

## 2025-02-07 PROCEDURE — 93005 ELECTROCARDIOGRAM TRACING: CPT | Performed by: STUDENT IN AN ORGANIZED HEALTH CARE EDUCATION/TRAINING PROGRAM

## 2025-02-07 PROCEDURE — 250N000011 HC RX IP 250 OP 636: Performed by: STUDENT IN AN ORGANIZED HEALTH CARE EDUCATION/TRAINING PROGRAM

## 2025-02-07 PROCEDURE — 99284 EMERGENCY DEPT VISIT MOD MDM: CPT | Mod: 25 | Performed by: STUDENT IN AN ORGANIZED HEALTH CARE EDUCATION/TRAINING PROGRAM

## 2025-02-07 PROCEDURE — 85025 COMPLETE CBC W/AUTO DIFF WBC: CPT | Performed by: STUDENT IN AN ORGANIZED HEALTH CARE EDUCATION/TRAINING PROGRAM

## 2025-02-07 PROCEDURE — 99284 EMERGENCY DEPT VISIT MOD MDM: CPT | Performed by: STUDENT IN AN ORGANIZED HEALTH CARE EDUCATION/TRAINING PROGRAM

## 2025-02-07 PROCEDURE — 87088 URINE BACTERIA CULTURE: CPT | Performed by: STUDENT IN AN ORGANIZED HEALTH CARE EDUCATION/TRAINING PROGRAM

## 2025-02-07 PROCEDURE — 84484 ASSAY OF TROPONIN QUANT: CPT | Performed by: STUDENT IN AN ORGANIZED HEALTH CARE EDUCATION/TRAINING PROGRAM

## 2025-02-07 PROCEDURE — 93010 ELECTROCARDIOGRAM REPORT: CPT | Performed by: STUDENT IN AN ORGANIZED HEALTH CARE EDUCATION/TRAINING PROGRAM

## 2025-02-07 RX ORDER — CEFTRIAXONE 2 G/1
2 INJECTION, POWDER, FOR SOLUTION INTRAMUSCULAR; INTRAVENOUS ONCE
Status: COMPLETED | OUTPATIENT
Start: 2025-02-07 | End: 2025-02-07

## 2025-02-07 RX ORDER — AMOXICILLIN 500 MG/1
500 CAPSULE ORAL 3 TIMES DAILY
Qty: 21 CAPSULE | Refills: 0 | Status: SHIPPED | OUTPATIENT
Start: 2025-02-07 | End: 2025-02-14

## 2025-02-07 RX ADMIN — CEFTRIAXONE SODIUM 2 G: 2 INJECTION, POWDER, FOR SOLUTION INTRAMUSCULAR; INTRAVENOUS at 12:58

## 2025-02-07 ASSESSMENT — ENCOUNTER SYMPTOMS
COUGH: 0
EYE REDNESS: 0
ARTHRALGIAS: 0
SHORTNESS OF BREATH: 0
ABDOMINAL PAIN: 0
ACTIVITY CHANGE: 0
FATIGUE: 0
DIARRHEA: 0
RHINORRHEA: 0
VOMITING: 0
HEMATURIA: 1
DYSURIA: 0
MYALGIAS: 0
DIZZINESS: 0
NECK STIFFNESS: 0
CHILLS: 0
SORE THROAT: 0
APPETITE CHANGE: 0
FEVER: 0
HEADACHES: 0
NAUSEA: 0

## 2025-02-07 ASSESSMENT — ACTIVITIES OF DAILY LIVING (ADL)
ADLS_ACUITY_SCORE: 56
ADLS_ACUITY_SCORE: 56
ADLS_ACUITY_SCORE: 54
ADLS_ACUITY_SCORE: 54

## 2025-02-07 ASSESSMENT — COLUMBIA-SUICIDE SEVERITY RATING SCALE - C-SSRS
2. HAVE YOU ACTUALLY HAD ANY THOUGHTS OF KILLING YOURSELF IN THE PAST MONTH?: NO
6. HAVE YOU EVER DONE ANYTHING, STARTED TO DO ANYTHING, OR PREPARED TO DO ANYTHING TO END YOUR LIFE?: NO
1. IN THE PAST MONTH, HAVE YOU WISHED YOU WERE DEAD OR WISHED YOU COULD GO TO SLEEP AND NOT WAKE UP?: NO

## 2025-02-07 NOTE — ED PROVIDER NOTES
History     Chief Complaint   Patient presents with    Hematuria     HPI  Brannon Boggs is a 85 year old male who dents with concerns of hematuria as well as a short-lived episode of chest pain.  Ultimately patient also suffers from dementia recent hospitalization in December secondary to weakness associated with UTI, small cell lymphoproliferative disorder, type 2 diabetes, coronary artery disease, hypertension, hyperlipidemia, hypothyroidism and obesity.  He has got a atrial sensing pacemaker in place.  He notes on arrival that his pain has been gone for some time regards to his chest pain he is not having any associated nausea vomiting sweating radiation of pain shortness of breath or any recent cough cold congestions fevers or viral-like illnesses.  He has no changes medications.  He does suffer from bladder cancer and does have intermittently have bloody urine.  This happened now as the fourth episode.  He notes he is not having obstruction of his urine and just feels like his urine has been red and has required antibiotics in the past episodes.    Urine culture noted to consist of Enterococcus and patient was started on Omnicef on discharge after receiving IV Rocephin.  He was then changed to amoxicillin for cultural sensitivities on 12/8 on that discharge.    Allergies:  No Known Allergies    Problem List:    Patient Active Problem List    Diagnosis Date Noted    Acute cystitis with hematuria 12/13/2024     Priority: Medium    Lactic acidosis 12/13/2024     Priority: Medium    Urinary tract infection with hematuria, site unspecified 12/13/2024     Priority: Medium    Leukocytosis, unspecified type 12/13/2024     Priority: Medium    Cholangitis (H) 11/18/2023     Priority: Medium    Diabetic neuropathy (H) 06/01/2021     Priority: Medium    Type 2 diabetes mellitus without complication (H) 06/01/2021     Priority: Medium    Hypothyroidism 06/01/2021     Priority: Medium    Paroxysmal atrial fibrillation  (H) 06/01/2021     Priority: Medium    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  Added automatically from request for surgery [id number]      Leukocytosis 08/01/2018     Priority: Medium    Agent Orange poisoning 04/06/2011     Priority: Medium     Formatting of this note might be different from the original.  Pers. Hx of exposure to agent orange      Hypercholesteremia 04/06/2011     Priority: Medium    Essential hypertension 07/19/2005     Priority: Medium     Formatting of this note might be different from the original.  Borderline  IMO Update          Past Medical History:    History reviewed. No pertinent past medical history.    Past Surgical History:    Past Surgical History:   Procedure Laterality Date    ENDOSCOPIC RETROGRADE CHOLANGIOPANCREATOGRAM N/A 11/18/2023    Procedure: ENDOSCOPIC RETROGRADE CHOLANGIOPANCREATOGRAPHY, WITH STENT PLACEMENT;  Surgeon: Jose Morse MD;  Location:  OR       Family History:    No family history on file.    Social History:  Marital Status:   [2]  Social History     Tobacco Use    Smoking status: Former    Smokeless tobacco: Never   Vaping Use    Vaping status: Never Used   Substance Use Topics    Alcohol use: Not Currently    Drug use: Not Currently        Medications:    amoxicillin (AMOXIL) 500 MG capsule  acetaminophen (TYLENOL) 325 MG tablet  aspirin (ASA) 81 MG EC tablet  atorvastatin (LIPITOR) 40 MG tablet  cyanocobalamin (VITAMIN B-12) 1000 MCG tablet  diclofenac (VOLTAREN) 1 % topical gel  EnovaRX-Lidocaine HCl 5 % cream  famotidine (PEPCID) 20 MG  tablet  fluticasone (FLONASE) 50 MCG/ACT nasal spray  gabapentin (NEURONTIN) 300 MG capsule  insulin glargine (LANTUS PEN) 100 UNIT/ML pen  levothyroxine (SYNTHROID/LEVOTHROID) 50 MCG tablet  Magnesium Oxide 420 MG TABS  metFORMIN (GLUCOPHAGE XR) 500 MG 24 hr tablet  nitroGLYcerin (NITROSTAT) 0.4 MG sublingual tablet  pantoprazole (PROTONIX) 40 MG EC tablet  Psyllium (NATURAL FIBER THERAPY) 48.57 % POWD  senna (SENOKOT) 8.6 MG tablet  vitamin C (ASCORBIC ACID) 500 MG tablet  Vitamin D3 (CHOLECALCIFEROL) 25 mcg (1000 units) tablet  zinc sulfate (ZINCATE) 220 (50 Zn) MG capsule          Review of Systems   Constitutional:  Negative for activity change, appetite change, chills, fatigue and fever.   HENT:  Negative for congestion, rhinorrhea and sore throat.    Eyes:  Negative for redness.   Respiratory:  Negative for cough and shortness of breath.    Cardiovascular:  Positive for chest pain.   Gastrointestinal:  Negative for abdominal pain, diarrhea, nausea and vomiting.   Genitourinary:  Positive for hematuria. Negative for dysuria.   Musculoskeletal:  Negative for arthralgias, myalgias and neck stiffness.   Skin:  Negative for rash.   Neurological:  Negative for dizziness and headaches.   All other systems reviewed and are negative.      Physical Exam   BP: (!) 154/69  Pulse: 74  Temp: 97.4  F (36.3  C)  Resp: 18  Weight: 104.9 kg (231 lb 3.2 oz)  SpO2: 100 %      Physical Exam  Vitals and nursing note reviewed.   Constitutional:       General: He is not in acute distress.     Appearance: Normal appearance. He is normal weight. He is not toxic-appearing.   HENT:      Head: Atraumatic.   Eyes:      General: No scleral icterus.     Conjunctiva/sclera: Conjunctivae normal.   Cardiovascular:      Rate and Rhythm: Normal rate.      Pulses: Normal pulses.      Heart sounds: Normal heart sounds.   Pulmonary:      Effort: Pulmonary effort is normal. No respiratory distress.      Breath sounds: Normal breath sounds. No  wheezing or rales.   Abdominal:      General: Abdomen is flat. Bowel sounds are normal. There is no distension.      Palpations: Abdomen is soft.      Tenderness: There is no abdominal tenderness.   Musculoskeletal:         General: No deformity. Normal range of motion.      Cervical back: Neck supple.   Skin:     General: Skin is warm and dry.      Capillary Refill: Capillary refill takes less than 2 seconds.   Neurological:      General: No focal deficit present.      Mental Status: He is alert and oriented to person, place, and time.   Psychiatric:         Mood and Affect: Mood normal.         ED Course        Procedures    EKG self interpreted at 9:52 AM with a heart rate of 66 bpm LA interval of 214, QRS of 194 and a QTc of 492.  Paced rhythm.  No obvious signs of ischemia present.  No ectopic beats.  Normal EKG.    Results for orders placed or performed during the hospital encounter of 02/07/25 (from the past 24 hours)   EKG 12-lead, tracing only   Result Value Ref Range    Systolic Blood Pressure  mmHg    Diastolic Blood Pressure  mmHg    Ventricular Rate 66 BPM    Atrial Rate 66 BPM    LA Interval 214 ms    QRS Duration 194 ms     ms    QTc 492 ms    P Axis 22 degrees    R AXIS -55 degrees    T Axis 112 degrees    Interpretation ECG       Atrial-sensed ventricular-paced rhythm with prolonged AV conduction  Abnormal ECG  When compared with ECG of 13-Dec-2024 16:13,  Vent. rate has decreased by   5 bpm     CBC with platelets differential    Narrative    The following orders were created for panel order CBC with platelets differential.  Procedure                               Abnormality         Status                     ---------                               -----------         ------                     CBC with platelets and d...[[id number]]  Abnormal            Final result               RBC and Platelet Morphology[[id number]]                      Final result                 Please view results for  these tests on the individual orders.   Basic metabolic panel   Result Value Ref Range    Sodium 134 (L) 135 - 145 mmol/L    Potassium 4.7 3.4 - 5.3 mmol/L    Chloride 100 98 - 107 mmol/L    Carbon Dioxide (CO2) 24 22 - 29 mmol/L    Anion Gap 10 7 - 15 mmol/L    Urea Nitrogen 15.5 8.0 - 23.0 mg/dL    Creatinine 0.89 0.67 - 1.17 mg/dL    GFR Estimate 85 >60 mL/min/1.73m2    Calcium 8.7 (L) 8.8 - 10.4 mg/dL    Glucose 234 (H) 70 - 99 mg/dL   Troponin T, High Sensitivity   Result Value Ref Range    Troponin T, High Sensitivity 17 <=22 ng/L   CBC with platelets and differential   Result Value Ref Range    WBC Count 10.4 4.0 - 11.0 10e3/uL    RBC Count 3.92 (L) 4.40 - 5.90 10e6/uL    Hemoglobin 10.4 (L) 13.3 - 17.7 g/dL    Hematocrit 33.1 (L) 40.0 - 53.0 %    MCV 84 78 - 100 fL    MCH 26.5 26.5 - 33.0 pg    MCHC 31.4 (L) 31.5 - 36.5 g/dL    RDW 14.2 10.0 - 15.0 %    Platelet Count 220 150 - 450 10e3/uL    % Neutrophils 32 %    % Lymphocytes 58 %    % Monocytes 8 %    % Eosinophils 2 %    % Basophils 1 %    % Immature Granulocytes 0 %    NRBCs per 100 WBC 0 <1 /100    Absolute Neutrophils 3.3 1.6 - 8.3 10e3/uL    Absolute Lymphocytes 6.0 (H) 0.8 - 5.3 10e3/uL    Absolute Monocytes 0.8 0.0 - 1.3 10e3/uL    Absolute Eosinophils 0.3 0.0 - 0.7 10e3/uL    Absolute Basophils 0.1 0.0 - 0.2 10e3/uL    Absolute Immature Granulocytes 0.0 <=0.4 10e3/uL    Absolute NRBCs 0.0 10e3/uL   RBC and Platelet Morphology   Result Value Ref Range    RBC Morphology Confirmed RBC Indices     Platelet Assessment  Automated Count Confirmed. Platelet morphology is normal.     Automated Count Confirmed. Platelet morphology is normal.   UA with Microscopic reflex to Culture    Specimen: Urine, Midstream   Result Value Ref Range    Color Urine Light Red (A) Colorless, Straw, Light Yellow, Yellow    Appearance Urine Slightly Cloudy (A) Clear    Glucose Urine 500 (A) Negative mg/dL    Bilirubin Urine Negative Negative    Ketones Urine Negative Negative  mg/dL    Specific Gravity Urine 1.011 1.003 - 1.035    Blood Urine Large (A) Negative    pH Urine 6.0 5.0 - 7.0    Protein Albumin Urine 70 (A) Negative mg/dL    Urobilinogen Urine Normal Normal, 2.0 mg/dL    Nitrite Urine Negative Negative    Leukocyte Esterase Urine Moderate (A) Negative    RBC Urine >182 (H) <=2 /HPF    WBC Urine >182 (H) <=5 /HPF    Narrative    Urine Culture ordered based on laboratory criteria       Medications   cefTRIAXone (ROCEPHIN) 2 g vial to attach to  ml bag for ADULTS or NS 50 ml bag for PEDS (has no administration in time range)       Assessments & Plan (with Medical Decision Making)     I have reviewed the nursing notes.    I have reviewed the findings, diagnosis, plan and need for follow up with the patient.      Medical Decision Making  rBannon Boggs is a 85 year old male who dents with concerns of hematuria as well as a short-lived episode of chest pain.  Ultimately patient also suffers from dementia recent hospitalization in December secondary to weakness associated with UTI, small cell lymphoproliferative disorder, type 2 diabetes, coronary artery disease, hypertension, hyperlipidemia, hypothyroidism and obesity.  He has got a atrial sensing pacemaker in place.  He notes on arrival that his pain has been gone for some time regards to his chest pain he is not having any associated nausea vomiting sweating radiation of pain shortness of breath or any recent cough cold congestions fevers or viral-like illnesses.  He has no changes medications.  He does suffer from bladder cancer and does have intermittently have bloody urine.  This happened now as the fourth episode.  He notes he is not having obstruction of his urine and just feels like his urine has been red and has required antibiotics in the past episodes.    Urine culture noted to consist of Enterococcus and patient was started on Omnicef on discharge after receiving IV Rocephin.  He was then changed to amoxicillin  for cultural sensitivities on 12/8 on that discharge.    Patient's vitals are reassuring with blood pressure of 174/86 temperature 97.4 pulse of 74 oxygen saturation of 100%.  EKG is reassuring for no obvious signs of new ischemic changes or arrhythmias.  Patient notes that he urinated prior to arrival and is unsure if he is able to urinate.  Bladder scan showed 125 mL of urine.  CBC is reassuring with no significant leukocytosis mild anemia is present at 10.4 patient's baseline between 10.5 and 11.5.  BMP is unremarkable aside from mild glucose elevation at 234.  UA is currently pending troponins at 17 within patient's past baseline as well.  Low concern for ischemic involvement.      UTI consistent with hematuria and acute infection.  Amoxicillin after Rocephin was provided for outpatient after chart review for previous antibiotic requirements and sensitivities.  Discussed case with patient return precautions patient discharged home    New Prescriptions    AMOXICILLIN (AMOXIL) 500 MG CAPSULE    Take 1 capsule (500 mg) by mouth 3 times daily for 7 days.       Final diagnoses:   Type 2 diabetes mellitus with hyperglycemia, with long-term current use of insulin (H)   Acute cystitis with hematuria       2/7/2025   [facility] EMERGENCY DEPT       Alden Vicente MD  02/07/25 [id number]

## 2025-02-07 NOTE — DISCHARGE INSTRUCTIONS
Has a UTI.  Please follow-up with your primary team to ensure improvement.  Please return if you start have any confusion fevers nausea vomiting or any other acute concerns requiring more admission and/or evaluation.  antibiotics sent to your pharmacy

## 2025-02-07 NOTE — ED TRIAGE NOTES
He is here for concern of hematuria and at triage also reports chest pain that he has had for the past 1-2 days.     Triage Assessment (Adult)       Row Name 02/07/25 0940          Triage Assessment    Airway WDL WDL        Skin Circulation/Temperature WDL    Skin Circulation/Temperature WDL WDL        Cardiac WDL    Cardiac WDL X;chest pain        Chest Pain Assessment    Chest Pain Location midsternal

## 2025-02-09 LAB
BACTERIA UR CULT: ABNORMAL
BACTERIA UR CULT: ABNORMAL

## 2025-02-10 ENCOUNTER — TELEPHONE (OUTPATIENT)
Dept: NURSING | Facility: CLINIC | Age: 85
End: 2025-02-10
Payer: COMMERCIAL

## 2025-02-10 NOTE — TELEPHONE ENCOUNTER
[facility]    Reason for call: Lab Result Notification     Lab Result (including Rx patient on, if applicable).  If culture, copy of lab report at bottom.  Lab Result: Final Urine Culture Report on 2/9/25  [facility] Emergency Dept discharge antibiotic prescribed: Amoxicillin 500 mg PO capsule, 1 capsule (500 mg) by mouth 3 times daily for 7 days   Bacterial Growth: 50,000 - 100,000 CFU/ML Enterococcus faecalis [Susceptible to Amoxicillin]    Patient's current Symptoms:   Brannon gave verbal permission to discuss results with his wife, Rossana.  Is still seeing the blood in the urine (has history of bladder cancer)  No pain with urination      RN Recommendations/Instructions per [place] ED lab result protocol:   [facility] ED lab result protocol utilized: urine culture  Encouraged to discuss the ongoing blood in the urine with his oncologist    Patient/care giver notified to contact your PCP clinic or return to the Emergency department if your:  Symptoms worsen or other concerning symptoms.        Julio Rodríguez RN

## 2025-03-20 LAB
ATRIAL RATE - MUSE: 66 BPM
DIASTOLIC BLOOD PRESSURE - MUSE: NORMAL MMHG
INTERPRETATION ECG - MUSE: NORMAL
P AXIS - MUSE: 22 DEGREES
PR INTERVAL - MUSE: 214 MS
QRS DURATION - MUSE: 194 MS
QT - MUSE: 470 MS
QTC - MUSE: 492 MS
R AXIS - MUSE: -55 DEGREES
SYSTOLIC BLOOD PRESSURE - MUSE: NORMAL MMHG
T AXIS - MUSE: 112 DEGREES
VENTRICULAR RATE- MUSE: 66 BPM

## (undated) DEVICE — DECANTER BAG 2002S

## (undated) DEVICE — SOL WATER IRRIG 1000ML BOTTLE 2F7114

## (undated) DEVICE — DRSG GAUZE 4X4" 3033

## (undated) DEVICE — RAD RX ISOVUE 300 (50ML) 61% IOPAMIDOL CHARGE PER ML

## (undated) DEVICE — SOL NACL 0.9% IRRIG 1000ML BOTTLE 2F7124

## (undated) DEVICE — ENDO CATH BALLOON EXTRACTOR PRO RX 12-15MM 4701

## (undated) DEVICE — Device

## (undated) DEVICE — INTR ENDOSCOPIC STENT FUSION OASIS 09.0FRX200CM

## (undated) DEVICE — ESU GROUND PAD ADULT W/CORD E7507

## (undated) DEVICE — NDL CANNULA INTERLINK BLUNT 18GA

## (undated) RX ORDER — FENTANYL CITRATE 50 UG/ML
INJECTION, SOLUTION INTRAMUSCULAR; INTRAVENOUS
Status: DISPENSED
Start: 2023-11-18